# Patient Record
Sex: FEMALE | Race: WHITE | NOT HISPANIC OR LATINO | Employment: OTHER | URBAN - METROPOLITAN AREA
[De-identification: names, ages, dates, MRNs, and addresses within clinical notes are randomized per-mention and may not be internally consistent; named-entity substitution may affect disease eponyms.]

---

## 2017-01-03 ENCOUNTER — APPOINTMENT (OUTPATIENT)
Dept: WOMENS IMAGING | Facility: HOSPITAL | Age: 72
End: 2017-01-03

## 2017-01-03 PROCEDURE — 77063 BREAST TOMOSYNTHESIS BI: CPT | Performed by: RADIOLOGY

## 2017-01-03 PROCEDURE — G0202 SCR MAMMO BI INCL CAD: HCPCS | Performed by: RADIOLOGY

## 2017-01-27 RX ORDER — ATORVASTATIN CALCIUM 10 MG/1
TABLET, FILM COATED ORAL
Qty: 90 TABLET | Refills: 1 | Status: SHIPPED | OUTPATIENT
Start: 2017-01-27 | End: 2017-07-24 | Stop reason: SDUPTHER

## 2017-02-24 RX ORDER — BENAZEPRIL HYDROCHLORIDE AND HYDROCHLOROTHIAZIDE 20; 12.5 MG/1; MG/1
TABLET ORAL
Qty: 90 TABLET | Refills: 1 | Status: SHIPPED | OUTPATIENT
Start: 2017-02-24 | End: 2017-08-22 | Stop reason: SDUPTHER

## 2017-04-24 RX ORDER — LEVOTHYROXINE SODIUM 50 MCG
TABLET ORAL
Qty: 90 TABLET | Refills: 1 | Status: SHIPPED | OUTPATIENT
Start: 2017-04-24 | End: 2017-11-08 | Stop reason: SDUPTHER

## 2017-06-13 ENCOUNTER — OFFICE VISIT (OUTPATIENT)
Dept: INTERNAL MEDICINE | Facility: CLINIC | Age: 72
End: 2017-06-13

## 2017-06-13 ENCOUNTER — HOSPITAL ENCOUNTER (OUTPATIENT)
Dept: GENERAL RADIOLOGY | Facility: HOSPITAL | Age: 72
Discharge: HOME | End: 2017-06-13
Attending: INTERNAL MEDICINE | Admitting: INTERNAL MEDICINE

## 2017-06-13 VITALS
BODY MASS INDEX: 32.15 KG/M2 | OXYGEN SATURATION: 96 % | RESPIRATION RATE: 16 BRPM | DIASTOLIC BLOOD PRESSURE: 83 MMHG | HEIGHT: 65 IN | HEART RATE: 84 BPM | WEIGHT: 193 LBS | SYSTOLIC BLOOD PRESSURE: 135 MMHG | TEMPERATURE: 98.2 F

## 2017-06-13 DIAGNOSIS — Z80.3 FAMILY HISTORY OF BREAST CANCER IN FEMALE: ICD-10-CM

## 2017-06-13 DIAGNOSIS — Z00.00 MEDICARE ANNUAL WELLNESS VISIT, INITIAL: Primary | ICD-10-CM

## 2017-06-13 DIAGNOSIS — M15.9 GENERALIZED OSTEOARTHRITIS: ICD-10-CM

## 2017-06-13 DIAGNOSIS — E04.2 NON-TOXIC MULTINODULAR GOITER: ICD-10-CM

## 2017-06-13 DIAGNOSIS — Z80.7 FAMILY HISTORY OF NON-HODGKIN'S LYMPHOMA: ICD-10-CM

## 2017-06-13 DIAGNOSIS — E78.49 OTHER HYPERLIPIDEMIA: ICD-10-CM

## 2017-06-13 DIAGNOSIS — R73.02 IMPAIRED GLUCOSE TOLERANCE: ICD-10-CM

## 2017-06-13 DIAGNOSIS — Z80.7 FAMILY HISTORY OF HODGKIN'S LYMPHOMA: ICD-10-CM

## 2017-06-13 DIAGNOSIS — E55.9 VITAMIN D DEFICIENCY: ICD-10-CM

## 2017-06-13 DIAGNOSIS — I10 ESSENTIAL HYPERTENSION: ICD-10-CM

## 2017-06-13 PROCEDURE — 99214 OFFICE O/P EST MOD 30 MIN: CPT | Performed by: INTERNAL MEDICINE

## 2017-06-13 PROCEDURE — G0438 PPPS, INITIAL VISIT: HCPCS | Performed by: INTERNAL MEDICINE

## 2017-06-13 PROCEDURE — 71020 HC CHEST PA AND LATERAL: CPT

## 2017-06-13 NOTE — PATIENT INSTRUCTIONS
Medicare Wellness  Personal Prevention Plan of Service     Date of Office Visit:  2017  Encounter Provider:  Beka Lara MD  Place of Service:  [facility] INTERNAL MEDICINE  Patient Name: Sagrario Varma  :  1945    As part of the Medicare Wellness portion of your visit today, we are providing you with this personalized preventive plan of services (PPPS). This plan is based upon recommendations of the [place] Preventive Services Task Force (USPSTF) and the Advisory Committee on Immunization Practices (ACIP).    This lists the preventive care services that should be considered, and provides dates of when you are due. Items listed as completed are up-to-date and do not require any further intervention.    Health Maintenance   Topic Date Due   • LIPID PANEL  2017   • INFLUENZA VACCINE  2017   • TDAP/TD VACCINES (2 - Td) 10/17/2017   • MEDICARE ANNUAL WELLNESS  2018   • MAMMOGRAM  2019   • COLONOSCOPY  2027   • HEPATITIS C SCREENING  Addressed   • PNEUMOCOCCAL VACCINES (65+ LOW/MEDIUM RISK)  Completed   • ZOSTER VACCINE  Completed       Orders Placed This Encounter   Procedures   • XR Chest PA & Lateral     Order Specific Question:   Reason for Exam:     Answer:   chronic cough   • Hemoglobin A1c   • Lipid Panel With LDL / HDL Ratio   • Comprehensive Metabolic Panel   • T4, Free   • TSH   • C-reactive Protein   • CBC & Differential     Order Specific Question:   Manual Differential     Answer:   No   • Urinalysis With Microscopic       Return in about 6 months (around 2017).

## 2017-06-13 NOTE — PROGRESS NOTES
QUICK REFERENCE INFORMATION:  The ABCs of the Annual Wellness Visit    Initial Medicare Wellness Visit    HEALTH RISK ASSESSMENT    1945    Recent Hospitalizations:  No hospitalization(s) within the last year..        Current Medical Providers:  Patient Care Team:  Beka Lara MD as PCP - General (Internal Medicine)  Beka Lara MD as PCP - Claims Attributed        Smoking Status:  History   Smoking Status   • Never Smoker   Smokeless Tobacco   • Never Used       Alcohol Consumption:  History   Alcohol Use No       Depression Screen:   PHQ-9 Depression Screening 6/13/2017   Little interest or pleasure in doing things 0   Feeling down, depressed, or hopeless 0   Trouble falling or staying asleep, or sleeping too much 0   Feeling tired or having little energy 0   Poor appetite or overeating 0   Feeling bad about yourself - or that you are a failure or have let yourself or your family down 0   Trouble concentrating on things, such as reading the newspaper or watching television 0   Moving or speaking so slowly that other people could have noticed. Or the opposite - being so fidgety or restless that you have been moving around a lot more than usual 0   Thoughts that you would be better off dead, or of hurting yourself in some way 0   PHQ-9 Total Score 0   If you checked off any problems, how difficult have these problems made it for you to do your work, take care of things at home, or get along with other people? Not difficult at all       Health Habits and Functional and Cognitive Screening:  Functional & Cognitive Status 6/13/2017   Do you have difficulty preparing food and eating? No   Do you have difficulty bathing yourself? No   Do you have difficulty getting dressed? No   Do you have difficulty using the toilet? No   Do you have difficulty moving around from place to place? No   In the past year have you fallen or experienced a near fall? No   Do you need help using the phone?  No   Are you  deaf or do you have serious difficulty hearing?  No   Do you need help with transportation? No   Do you need help shopping? No   Do you need help preparing meals?  No   Do you need help with housework?  No   Do you need help with laundry? No   Do you need help taking your medications? No   Do you need help managing money? No   Do you have difficulty concentrating, remembering or making decisions? No       Health Habits  Current Diet: Well Balanced Diet  Dental Exam: Up to date  Eye Exam: Up to date  Exercise (times per week): 5 times per week  Current Exercise Activities Include: Yard Work          Does the patient have evidence of cognitive impairment? No    Asiprin use counseling: Taking ASA appropriately as indicated      Recent Lab Results:    Visual Acuity:  No exam data present    Age-appropriate Screening Schedule:  Refer to the list below for future screening recommendations based on patient's age, sex and/or medical conditions. Orders for these recommended tests are listed in the plan section. The patient has been provided with a written plan.    Health Maintenance   Topic Date Due   • LIPID PANEL  06/02/2017   • INFLUENZA VACCINE  08/01/2017   • TDAP/TD VACCINES (2 - Td) 10/17/2017   • MAMMOGRAM  01/03/2019   • COLONOSCOPY  01/06/2027   • PNEUMOCOCCAL VACCINES (65+ LOW/MEDIUM RISK)  Completed   • ZOSTER VACCINE  Completed        Subjective   History of Present Illness    Sagrario Varma is a 71 y.o. female who presents for an Annual Wellness Visit.    The following portions of the patient's history were reviewed and updated as appropriate: allergies, current medications, past family history, past medical history, past social history, past surgical history and problem list.    Outpatient Medications Prior to Visit   Medication Sig Dispense Refill   • ACTICLATE 75 MG tablet      • Ascorbic Acid (VITAMIN C) 500 MG capsule Take 1 capsule by mouth.     • aspirin 81 MG tablet Take 81 mg by mouth Daily.     •  "atorvastatin (LIPITOR) 10 MG tablet TAKE 1 TABLET DAILY 90 tablet 1   • benazepril-hydrochlorthiazide (LOTENSIN HCT) 20-12.5 MG per tablet TAKE 1 TABLET DAILY 90 tablet 1   • Calcium 500-125 MG-UNIT tablet Take 2 tablets by mouth.     • Cholecalciferol (VITAMIN D) 1000 UNITS tablet Take 1,000 Units by mouth.     • ROSADAN 0.75 % (CREAM) kit      • SYNTHROID 50 MCG tablet TAKE 1 TABLET DAILY 90 tablet 1   • mometasone (NASONEX) 50 MCG/ACT nasal spray 2 sprays into each nostril Daily. 1 each 12     No facility-administered medications prior to visit.        Patient Active Problem List   Diagnosis   • Arthritis   • Generalized osteoarthritis   • Hyperglycemia   • Hyperlipidemia   • Hypertension   • Impacted cerumen   • Non-toxic multinodular goiter   • Osteoarthritis   • Impaired glucose tolerance   • Vitamin D deficiency   • Encounter for screening colonoscopy   • Thyroid nodule   • Family history of non-Hodgkin's lymphoma   • Family history of Hodgkin's lymphoma   • Family history of breast cancer in female   • Medicare annual wellness visit, initial       Advance Care Planning:  has NO advance directive - not interested in additional information    Identification of Risk Factors:  Risk factors include: weight .    Review of Systems    Compared to one year ago, the patient feels her physical health is the same.  Compared to one year ago, the patient feels her mental health is the same.    Objective     Physical Exam    Vitals:    06/13/17 0853   BP: 135/83   BP Location: Left arm   Patient Position: Sitting   Cuff Size: Small Adult   Pulse: 84   Resp: 16   Temp: 98.2 °F (36.8 °C)   TempSrc: Oral   SpO2: 96%   Weight: 193 lb (87.5 kg)   Height: 64.5\" (163.8 cm)   PainSc: 0-No pain       Body mass index is 32.62 kg/(m^2).  Discussed the patient's BMI with her. The BMI is above average; BMI management plan is completed.    Assessment/Plan   Patient Self-Management and Personalized Health Advice  The patient has been " provided with information about: weight management and preventive services including:   · Exercise counseling provided, Nutrition counseling provided.    Visit Diagnoses:    ICD-10-CM ICD-9-CM   1. Medicare annual wellness visit, initial Z00.00 V70.0   2. Family history of non-Hodgkin's lymphoma Z80.7 V16.7   3. Family history of Hodgkin's lymphoma Z80.7 V16.7   4. Family history of breast cancer in female Z80.3 V16.3   5. Generalized osteoarthritis M15.9 715.00   6. Essential hypertension I10 401.9   7. Other hyperlipidemia E78.4 272.4   8. Vitamin D deficiency E55.9 268.9   9. Impaired glucose tolerance R73.02 790.22   10. Non-toxic multinodular goiter E04.2 241.1       Orders Placed This Encounter   Procedures   • Hemoglobin A1c   • Lipid Panel With LDL / HDL Ratio   • Comprehensive Metabolic Panel   • T4, Free   • TSH   • C-reactive Protein   • CBC & Differential     Order Specific Question:   Manual Differential     Answer:   No   • Urinalysis With Microscopic       Outpatient Encounter Prescriptions as of 6/13/2017   Medication Sig Dispense Refill   • ACTICLATE 75 MG tablet      • Ascorbic Acid (VITAMIN C) 500 MG capsule Take 1 capsule by mouth.     • aspirin 81 MG tablet Take 81 mg by mouth Daily.     • atorvastatin (LIPITOR) 10 MG tablet TAKE 1 TABLET DAILY 90 tablet 1   • benazepril-hydrochlorthiazide (LOTENSIN HCT) 20-12.5 MG per tablet TAKE 1 TABLET DAILY 90 tablet 1   • Calcium 500-125 MG-UNIT tablet Take 2 tablets by mouth.     • Cholecalciferol (VITAMIN D) 1000 UNITS tablet Take 1,000 Units by mouth.     • ROSADAN 0.75 % (CREAM) kit      • SYNTHROID 50 MCG tablet TAKE 1 TABLET DAILY 90 tablet 1   • mometasone (NASONEX) 50 MCG/ACT nasal spray 2 sprays into each nostril Daily. 1 each 12     No facility-administered encounter medications on file as of 6/13/2017.        Reviewed use of high risk medication in the elderly: yes  Reviewed for potential of harmful drug interactions in the elderly:  yes    Follow Up:  No Follow-up on file.     An After Visit Summary and PPPS with all of these plans were given to the patient.

## 2017-06-14 LAB
ALBUMIN SERPL-MCNC: 4.5 G/DL (ref 3.5–4.8)
ALBUMIN/GLOB SERPL: 1.6 {RATIO} (ref 1.2–2.2)
ALP SERPL-CCNC: 102 IU/L (ref 39–117)
ALT SERPL-CCNC: 27 IU/L (ref 0–32)
APPEARANCE UR: CLEAR
AST SERPL-CCNC: 26 IU/L (ref 0–40)
BACTERIA #/AREA URNS HPF: NORMAL /[HPF]
BASOPHILS # BLD AUTO: 0.1 X10E3/UL (ref 0–0.2)
BASOPHILS NFR BLD AUTO: 1 %
BILIRUB SERPL-MCNC: 0.4 MG/DL (ref 0–1.2)
BILIRUB UR QL STRIP: NEGATIVE
BUN SERPL-MCNC: 10 MG/DL (ref 8–27)
BUN/CREAT SERPL: 16 (ref 12–28)
CALCIUM SERPL-MCNC: 9.2 MG/DL (ref 8.7–10.3)
CHLORIDE SERPL-SCNC: 99 MMOL/L (ref 96–106)
CHOLEST SERPL-MCNC: 167 MG/DL (ref 100–199)
CO2 SERPL-SCNC: 26 MMOL/L (ref 18–29)
COLOR UR: YELLOW
CREAT SERPL-MCNC: 0.63 MG/DL (ref 0.57–1)
CRP SERPL-MCNC: 0.6 MG/L (ref 0–4.9)
EOSINOPHIL # BLD AUTO: 0.2 X10E3/UL (ref 0–0.4)
EOSINOPHIL NFR BLD AUTO: 3 %
EPI CELLS #/AREA URNS HPF: NORMAL /HPF
ERYTHROCYTE [DISTWIDTH] IN BLOOD BY AUTOMATED COUNT: 13.7 % (ref 12.3–15.4)
GLOBULIN SER CALC-MCNC: 2.8 G/DL (ref 1.5–4.5)
GLUCOSE SERPL-MCNC: 95 MG/DL (ref 65–99)
GLUCOSE UR QL: NEGATIVE
HBA1C MFR BLD: 5.9 % (ref 4.8–5.6)
HCT VFR BLD AUTO: 43 % (ref 34–46.6)
HDLC SERPL-MCNC: 49 MG/DL
HGB BLD-MCNC: 14.4 G/DL (ref 11.1–15.9)
HGB UR QL STRIP: NEGATIVE
IMM GRANULOCYTES # BLD: 0 X10E3/UL (ref 0–0.1)
IMM GRANULOCYTES NFR BLD: 0 %
KETONES UR QL STRIP: NEGATIVE
LDLC SERPL CALC-MCNC: 94 MG/DL (ref 0–99)
LDLC/HDLC SERPL: 1.9 RATIO UNITS (ref 0–3.2)
LEUKOCYTE ESTERASE UR QL STRIP: (no result)
LYMPHOCYTES # BLD AUTO: 2.3 X10E3/UL (ref 0.7–3.1)
LYMPHOCYTES NFR BLD AUTO: 36 %
MCH RBC QN AUTO: 29.3 PG (ref 26.6–33)
MCHC RBC AUTO-ENTMCNC: 33.5 G/DL (ref 31.5–35.7)
MCV RBC AUTO: 87 FL (ref 79–97)
MICRO URNS: (no result)
MONOCYTES # BLD AUTO: 0.6 X10E3/UL (ref 0.1–0.9)
MONOCYTES NFR BLD AUTO: 9 %
MUCOUS THREADS URNS QL MICRO: PRESENT
NEUTROPHILS # BLD AUTO: 3.4 X10E3/UL (ref 1.4–7)
NEUTROPHILS NFR BLD AUTO: 51 %
NITRITE UR QL STRIP: NEGATIVE
PH UR STRIP: 7 [PH] (ref 5–7.5)
PLATELET # BLD AUTO: 346 X10E3/UL (ref 150–379)
POTASSIUM SERPL-SCNC: 4.3 MMOL/L (ref 3.5–5.2)
PROT SERPL-MCNC: 7.3 G/DL (ref 6–8.5)
PROT UR QL STRIP: NEGATIVE
RBC # BLD AUTO: 4.92 X10E6/UL (ref 3.77–5.28)
RBC #/AREA URNS HPF: NORMAL /HPF
SODIUM SERPL-SCNC: 142 MMOL/L (ref 134–144)
SP GR UR: 1.01 (ref 1–1.03)
T4 FREE SERPL-MCNC: 1.42 NG/DL (ref 0.82–1.77)
TRIGL SERPL-MCNC: 119 MG/DL (ref 0–149)
TSH SERPL DL<=0.005 MIU/L-ACNC: 2.47 UIU/ML (ref 0.45–4.5)
UROBILINOGEN UR STRIP-MCNC: 0.2 MG/DL (ref 0.2–1)
VLDLC SERPL CALC-MCNC: 24 MG/DL (ref 5–40)
WBC # BLD AUTO: 6.5 X10E3/UL (ref 3.4–10.8)
WBC #/AREA URNS HPF: NORMAL /HPF

## 2017-06-25 NOTE — PROGRESS NOTES
Subjective   Sagrario Varma is a 71 y.o. female.   She is here today for Medicare annual wellness visit initial along with family history of non-Hodgkin's lymphoma family history of Hodgkin's lymphoma as well and family history of breast cancer in female and generalized osteoarthritis hypertension hyperlipidemia vitamin D deficiency impaired glucose tolerance and nontoxic multinodular goiter  History of Present Illness   She is here today for Medicare annual wellness visit initial along with family history of non-Hodgkin's lymphoma as well as Hodgkin's lymphoma as well as family history of breast cancer in female and generalized osteoarthritis hypertension hyper lipidemia vitamin D deficiency impaired fasting glucose and nontoxic multinodular goiter  The following portions of the patient's history were reviewed and updated as appropriate: allergies, current medications, past family history, past medical history, past social history, past surgical history and problem list.    Review of Systems   All other systems reviewed and are negative.      Objective   Physical Exam   Constitutional: She is oriented to person, place, and time. Vital signs are normal. She appears well-developed and well-nourished. She is active.   HENT:   Head: Normocephalic and atraumatic.   Right Ear: Hearing, tympanic membrane, external ear and ear canal normal.   Left Ear: Hearing, tympanic membrane, external ear and ear canal normal.   Nose: Nose normal.   Mouth/Throat: Uvula is midline, oropharynx is clear and moist and mucous membranes are normal.   Eyes: Conjunctivae, EOM and lids are normal. Pupils are equal, round, and reactive to light. Right eye exhibits no discharge. Left eye exhibits no discharge.   Neck: Trachea normal, normal range of motion, full passive range of motion without pain and phonation normal. Neck supple. Carotid bruit is not present. No edema present. Thyroid mass (Goiter) present. No thyromegaly present.    Cardiovascular: Normal rate, regular rhythm, normal heart sounds, intact distal pulses and normal pulses.  Exam reveals no gallop and no friction rub.    No murmur heard.  Pulmonary/Chest: Effort normal and breath sounds normal. No respiratory distress. She has no wheezes. She has no rales.   Abdominal: Soft. Normal appearance, normal aorta and bowel sounds are normal. She exhibits no distension, no abdominal bruit and no mass. There is no hepatosplenomegaly. There is no tenderness. There is no rebound, no guarding and no CVA tenderness. No hernia. Hernia confirmed negative in the right inguinal area and confirmed negative in the left inguinal area.   Musculoskeletal: Normal range of motion. She exhibits no edema or tenderness.       Vascular Status -  Her exam exhibits right foot vasculature normal. Her exam exhibits no right foot edema. Her exam exhibits left foot vasculature normal. Her exam exhibits no left foot edema.   Skin Integrity  -  Her right foot skin is intact.     Sagrario 's left foot skin is intact. .  Lymphadenopathy:     She has no cervical adenopathy.     She has no axillary adenopathy.        Right: No inguinal and no supraclavicular adenopathy present.        Left: No inguinal and no supraclavicular adenopathy present.   Neurological: She is alert and oriented to person, place, and time. She has normal strength. No cranial nerve deficit or sensory deficit. She exhibits normal muscle tone. She displays a negative Romberg sign. Coordination normal.   Skin: Skin is warm, dry and intact. No cyanosis. Nails show no clubbing.   Psychiatric: She has a normal mood and affect. Her speech is normal and behavior is normal. Judgment and thought content normal. Cognition and memory are normal.   Nursing note and vitals reviewed.      Assessment/Plan   Diagnoses and all orders for this visit:    Medicare annual wellness visit, initial  -     Hemoglobin A1c  -     Lipid Panel With LDL / HDL Ratio  -     CBC &  Differential  -     Comprehensive Metabolic Panel  -     T4, Free  -     TSH  -     Urinalysis With Microscopic  -     C-reactive Protein    Family history of non-Hodgkin's lymphoma  -     Hemoglobin A1c  -     Lipid Panel With LDL / HDL Ratio  -     CBC & Differential  -     Comprehensive Metabolic Panel  -     T4, Free  -     TSH  -     Urinalysis With Microscopic  -     C-reactive Protein  -     XR Chest PA & Lateral    Family history of Hodgkin's lymphoma  -     Hemoglobin A1c  -     Lipid Panel With LDL / HDL Ratio  -     CBC & Differential  -     Comprehensive Metabolic Panel  -     T4, Free  -     TSH  -     Urinalysis With Microscopic  -     C-reactive Protein  -     XR Chest PA & Lateral    Family history of breast cancer in female  -     Hemoglobin A1c  -     Lipid Panel With LDL / HDL Ratio  -     CBC & Differential  -     Comprehensive Metabolic Panel  -     T4, Free  -     TSH  -     Urinalysis With Microscopic  -     C-reactive Protein  -     XR Chest PA & Lateral    Generalized osteoarthritis  -     Hemoglobin A1c  -     Lipid Panel With LDL / HDL Ratio  -     CBC & Differential  -     Comprehensive Metabolic Panel  -     T4, Free  -     TSH  -     Urinalysis With Microscopic  -     C-reactive Protein    Essential hypertension  -     Hemoglobin A1c  -     Lipid Panel With LDL / HDL Ratio  -     CBC & Differential  -     Comprehensive Metabolic Panel  -     T4, Free  -     TSH  -     Urinalysis With Microscopic  -     C-reactive Protein    Other hyperlipidemia  -     Hemoglobin A1c  -     Lipid Panel With LDL / HDL Ratio  -     CBC & Differential  -     Comprehensive Metabolic Panel  -     T4, Free  -     TSH  -     Urinalysis With Microscopic  -     C-reactive Protein    Vitamin D deficiency  -     Hemoglobin A1c  -     Lipid Panel With LDL / HDL Ratio  -     CBC & Differential  -     Comprehensive Metabolic Panel  -     T4, Free  -     TSH  -     Urinalysis With Microscopic  -     C-reactive  Protein    Impaired glucose tolerance  -     Hemoglobin A1c  -     Lipid Panel With LDL / HDL Ratio  -     CBC & Differential  -     Comprehensive Metabolic Panel  -     T4, Free  -     TSH  -     Urinalysis With Microscopic  -     C-reactive Protein    Non-toxic multinodular goiter  -     Hemoglobin A1c  -     Lipid Panel With LDL / HDL Ratio  -     CBC & Differential  -     Comprehensive Metabolic Panel  -     T4, Free  -     TSH  -     Urinalysis With Microscopic  -     C-reactive Protein    Other orders  -     Microscopic Examination        Medicare annual wellness visit initial following recommendations  Impaired glucose tolerance follow hemoglobin A1 C  Nontoxic multinodular goiter noted  Vitamin D deficiency supplementation as needed  Hyper lipidemia keep LDL less than 70 with proper diet exercise medication  Hypertension well-controlled on current medication  Osteoarthritis supportive meds  Family history of breast cancer female noted  Family history of Hodgkin's lymphoma noted  Family history of non-Hodgkin's lymphoma noted

## 2017-07-24 RX ORDER — ATORVASTATIN CALCIUM 10 MG/1
TABLET, FILM COATED ORAL
Qty: 90 TABLET | Refills: 1 | Status: SHIPPED | OUTPATIENT
Start: 2017-07-24 | End: 2018-02-08 | Stop reason: SDUPTHER

## 2017-08-14 ENCOUNTER — APPOINTMENT (OUTPATIENT)
Dept: LAB | Facility: HOSPITAL | Age: 72
End: 2017-08-14

## 2017-08-14 ENCOUNTER — APPOINTMENT (OUTPATIENT)
Dept: ONCOLOGY | Facility: CLINIC | Age: 72
End: 2017-08-14

## 2017-08-22 RX ORDER — BENAZEPRIL HYDROCHLORIDE AND HYDROCHLOROTHIAZIDE 20; 12.5 MG/1; MG/1
TABLET ORAL
Qty: 90 TABLET | Refills: 1 | Status: SHIPPED | OUTPATIENT
Start: 2017-08-22 | End: 2018-02-14 | Stop reason: SDUPTHER

## 2017-08-29 ENCOUNTER — LAB (OUTPATIENT)
Dept: LAB | Facility: HOSPITAL | Age: 72
End: 2017-08-29

## 2017-08-29 ENCOUNTER — OFFICE VISIT (OUTPATIENT)
Dept: ONCOLOGY | Facility: CLINIC | Age: 72
End: 2017-08-29

## 2017-08-29 VITALS
TEMPERATURE: 97.9 F | BODY MASS INDEX: 32.3 KG/M2 | SYSTOLIC BLOOD PRESSURE: 142 MMHG | HEIGHT: 64 IN | RESPIRATION RATE: 18 BRPM | OXYGEN SATURATION: 98 % | WEIGHT: 189.2 LBS | DIASTOLIC BLOOD PRESSURE: 84 MMHG | HEART RATE: 71 BPM

## 2017-08-29 DIAGNOSIS — E78.1 PURE HYPERGLYCERIDEMIA: ICD-10-CM

## 2017-08-29 DIAGNOSIS — M15.9 GENERALIZED OSTEOARTHRITIS: ICD-10-CM

## 2017-08-29 DIAGNOSIS — C50.919 MALIGNANT NEOPLASM OF FEMALE BREAST, UNSPECIFIED LATERALITY, UNSPECIFIED SITE OF BREAST: Primary | ICD-10-CM

## 2017-08-29 DIAGNOSIS — M19.90 ARTHRITIS: ICD-10-CM

## 2017-08-29 DIAGNOSIS — R73.9 HYPERGLYCEMIA: ICD-10-CM

## 2017-08-29 DIAGNOSIS — Z12.11 ENCOUNTER FOR SCREENING COLONOSCOPY: Primary | ICD-10-CM

## 2017-08-29 DIAGNOSIS — D05.10 DCIS (DUCTAL CARCINOMA IN SITU), UNSPECIFIED LATERALITY: ICD-10-CM

## 2017-08-29 LAB
BASOPHILS # BLD AUTO: 0.08 10*3/MM3 (ref 0–0.1)
BASOPHILS NFR BLD AUTO: 1.1 % (ref 0–1.1)
DEPRECATED RDW RBC AUTO: 40.8 FL (ref 37–49)
EOSINOPHIL # BLD AUTO: 0.21 10*3/MM3 (ref 0–0.36)
EOSINOPHIL NFR BLD AUTO: 2.8 % (ref 1–5)
ERYTHROCYTE [DISTWIDTH] IN BLOOD BY AUTOMATED COUNT: 12.5 % (ref 11.7–14.5)
HCT VFR BLD AUTO: 43.3 % (ref 34–45)
HGB BLD-MCNC: 14.9 G/DL (ref 11.5–14.9)
IMM GRANULOCYTES # BLD: 0.03 10*3/MM3 (ref 0–0.03)
IMM GRANULOCYTES NFR BLD: 0.4 % (ref 0–0.5)
LYMPHOCYTES # BLD AUTO: 2.66 10*3/MM3 (ref 1–3.5)
LYMPHOCYTES NFR BLD AUTO: 35.6 % (ref 20–49)
MCH RBC QN AUTO: 30.5 PG (ref 27–33)
MCHC RBC AUTO-ENTMCNC: 34.4 G/DL (ref 32–35)
MCV RBC AUTO: 88.5 FL (ref 83–97)
MONOCYTES # BLD AUTO: 0.82 10*3/MM3 (ref 0.25–0.8)
MONOCYTES NFR BLD AUTO: 11 % (ref 4–12)
NEUTROPHILS # BLD AUTO: 3.68 10*3/MM3 (ref 1.5–7)
NEUTROPHILS NFR BLD AUTO: 49.1 % (ref 39–75)
NRBC BLD MANUAL-RTO: 0 /100 WBC (ref 0–0)
PLATELET # BLD AUTO: 283 10*3/MM3 (ref 150–375)
PMV BLD AUTO: 9.6 FL (ref 8.9–12.1)
RBC # BLD AUTO: 4.89 10*6/MM3 (ref 3.9–5)
WBC NRBC COR # BLD: 7.48 10*3/MM3 (ref 4–10)

## 2017-08-29 PROCEDURE — 85025 COMPLETE CBC W/AUTO DIFF WBC: CPT | Performed by: INTERNAL MEDICINE

## 2017-08-29 PROCEDURE — 99213 OFFICE O/P EST LOW 20 MIN: CPT | Performed by: INTERNAL MEDICINE

## 2017-08-29 PROCEDURE — 36416 COLLJ CAPILLARY BLOOD SPEC: CPT | Performed by: INTERNAL MEDICINE

## 2017-08-29 NOTE — PROGRESS NOTES
Subjective .     REASONS FOR FOLLOWUP:    1. 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   2. Status post partial thyroidectomy.   3. Status post knee surgery.     HISTORY OF PRESENT ILLNESS:  The patient is a 71 y.o. year old female who is here for follow-up with the above-mentioned history.    History of Present Illness     The patient is a 67-year-old female with the above history, currently here for followup. She has completed 5 years of tamoxifen as of 2015. She is doing very well. She does not h ave any complaints. She has undergone mammogram on January 3, 2017 which was negative.     PAST MEDICAL HISTORY:   4. Hypertension.   5. High cholesterol.   6. Multinodular goiter of the thyroid gland. She did undergo ultrasound of the thyroid gland and found multinodular goiter. She is followed by Dr. Restrepo for that. She has had a biopsy in the past which was negative.   7. Dilatation and curettage x2 for excessive bleeding.     OB/GYN HISTORY: She started menstrual period at age 12. She obtained menopause at age 56. She is  4, para 3, one stillborn. She has 3 children, two daughters and one son, 37, 35 and 28 year old with normal deliveries.     ONCOLOGIC HISTORY:      The patient was following up with yearly mammogram. She had her yearly mammogram done  10 by Dr. Napoles her primary care physician which showed calcifications in the right breast with additional evaluation and as a result she repeated the mammogram on 2010 which showed calcifications in the right breast which were suspicious in the anterior one-third of the right breast at the 12 o' clock position 2 cm away from the nipple.    Subsequently the patient underwent biopsy on 20 10 done at [facility], #[id number]. Path is consistent with ductal carcinoma in situ low to intermediate grade, solid and cribriform types, at least 5 mm in  maximum dimension. There was no invasive carcinoma identified and there was some fibrocystic changes identified. Hormonal assays were performed, ER positive at 99%, NY positive at 99%, HER/2 was 1+ and Ki-67 was 6.      She subsequently was referred to Dr. Reid and MRI of the breast was also obtained. MRI of the breast 0 4/24/20 10 had shown post biopsy cavity within the right breast at the 12 o'clock position with an internal metallic clip but no suspicio us surrounding or residual enhancement seen in the right breast. There was no evidence of any axillary adenopathy. There were no suspicious findings on the left breast.      The patient then was admitted to [facility] at which time she underwent surgery by Dr. Reid 0 5/19/20 10. Pathology #[id number]. She underwent right breast lumpectomy with needle localization. The right breast lumpectomy showed breast tissue with single focus of atypical ductal hyperplasia adjacent to the biopsy site. Res idual carcinoma was not identified. There were some fibrocystic changes including ductal hyperplasia without atypia. The additional superior margin on the right breast showed duct hyperplasia without atypia and the deep margin also showed some duct hype rplasia without atypia.      As a result the patient was then referred to Dr. Sumeet Reddy for evaluation of brachytherapy. The patient then received brachytherapy with 10 total fractions given twice daily over a 5- treatment- day period and was prescribed 350 cGy per treatment fraction. The total dose she received was 3400 cGy in 10 fractions. Currently she is healed from surgery and is here for further recommendations.      Tamoxifen chemoprophylaxis started on 06/22/2010 to complete a total of 5 years.    Mammogram from 12/10/20 11 showed a new oval mass of 4.5 cm. She had a few round calcifications and postsurgical scar in the anterior one-third region of the right breast at the 12 o'clock positive. This area  was thought to be consistent with seroma and pos tsurgical change, however, repeat mammogram was suggested in 6 months.        Dr. Reid aspirated 25 cc of clear fluid on 12/14/10 from the right side and the pathology, accession #[id number], was thought to have adipose stroma tissue, histiocytes and inflammat ory cells. No malignant cells were identified, consistent with fat necrosis. However, repeat mammogram and ultrasound are to be done because of the palpable mass, which is significantly large.      The patient underwent radiation 3400 cGy depth of 1 cm from the MammoSite balloon surface, given 10 fractions of 340 cGy each. Treatments were delivered for 5  treatment days, given twice daily. She completed her brachytherapy on 06/09/20 10.      The patient underwent mammogram on 05/17/2011. She was found to have a seroma in the right breast thought to be probably benign and she was to have bilateral yearly mammograms and ultrasound was suggested in  6 months but she has followup with Dr. Reid. He had done drainage of the right breast. The accession # is [id number] at [facility].   So the right breast fine needle aspiration basically showed some inflammatory cells. No malignant cells identified and hence he thought it was just a seroma and not to worry about.    Mammogram 12/14/2011 shows seroma in the right breast, probably benign, however, followup mammogram suggested in  6 months and followup ultrasound of the right breast is suggested in six months.      Mammogram done in June 2012 showed that she had seroma on the right breast, which was 21 mm with calcifications and postsurgical scar in the anterior one-third region of the right breast. It was felt that this is consistent with the seroma, as it had decreased in size. A right breast ultrasound was also done and the ultrasound suggested an oval elongated seroma w ith thick margins, about 21 mm. No solid or suspicious lesions were seen, but a 6-month followup  was suggested.      Mammogram on 2012 was negative.    Patient had mammogram on 2013 and it was negative.    Her mammogram done  4 is negative.    Current Outpatient Prescriptions on File Prior to Visit   Medication Sig Dispense Refill   • ACTICLATE 75 MG tablet      • Ascorbic Acid (VITAMIN C) 500 MG capsule Take 1 capsule by mouth.     • aspirin 81 MG tablet Take 81 mg by mouth Daily.     • atorvastatin (LIPITOR) 10 MG tablet TAKE 1 TABLET DAILY 90 tablet 1   • benazepril-hydrochlorthiazide (LOTENSIN HCT) 20-12.5 MG per tablet TAKE 1 TABLET DAILY 90 tablet 1   • Calcium 500-125 MG-UNIT tablet Take 1 tablet by mouth.     • Cholecalciferol (VITAMIN D) 1000 UNITS tablet Take 1,000 Units by mouth.     • ROSADAN 0.75 % (CREAM) kit      • SYNTHROID 50 MCG tablet TAKE 1 TABLET DAILY 90 tablet 1   • [DISCONTINUED] mometasone (NASONEX) 50 MCG/ACT nasal spray 2 sprays into each nostril Daily. 1 each 12     No current facility-administered medications on file prior to visit.        ALLERGIES:     Allergies   Allergen Reactions   • Morphine Nausea And Vomiting   • Penicillins Hives     SOCIAL HISTORY: She works as a . She does not smoke. She does not drink alcohol. She is  and lives with her .     FAMILY HISTORY: Father  at 57 with a blood clot. Mother  at 72 with a st roke. She has one brother who had melanoma as well as non-Hodgkin lymphoma at age 41; currently he is 65 years old and in good health. She had a sister with breast cancer at age 50. She is followed by our office. She is 57 now and in good health. Hakeem edwards's sister who had breast cancer many years ago now developed metastatic breast cancer, followed by Dr. Gold in our group.         Review of Systems  A comprehensive 14 point review of systems was performed and was negative except as mentioned.    Objective      Vitals:    17 1136   BP: 142/84   Pulse: 71   Resp: 18   Temp: 97.9 °F (36.6 °C)  "  TempSrc: Oral   SpO2: 98%   Weight: 189 lb 3.2 oz (85.8 kg)   Height: 64.37\" (163.5 cm)   PainSc: 0-No pain     Current Status 8/29/2017   ECOG score 0       Physical Exam    GENERAL:  Well-developed, well-nourished in no acute distress.   SKIN:  Warm, dry without rashes, purpura or petechiae.  HEAD:  Normocephalic.  EYES:  Pupils equal, round and reactive to light.  EOMs intact.  Conjunctivae normal.  EARS:  Hearing intact.  NOSE:  Septum midline.  No excoriations or nasal discharge.  MOUTH:  Tongue is well-papillated; no stomatitis or ulcers.  Lips normal.  THROAT:  Oropharynx without lesions or exudates.  NECK:  Supple with good range of motion; no thyromegaly or masses, no JVD.  LYMPHATICS:  No cervical, supraclavicular, axillary or inguinal adenopathy.  CHEST:  Lungs clear to percussion and auscultation. Good airflow.  BREASTS: Right breast, no evidence of any breast masses, no nipple inversion, no skin thickening or erythema there is no evidence of right axillary adenopathy or right supraclavicular adenopathy.  Left breast: no evidence of any breast masses, no nipple inversion, no skin thickening or erythema there is no evidence of right axillary adenopathy or right supraclavicular adenopathy.    CARDIAC:  Regular rate and rhythm without murmurs, rubs or gallops. Normal S1,S2.  ABDOMEN:  Soft, nontender with no organomegaly or masses.  EXTREMITIES:  No clubbing, cyanosis or edema.  NEUROLOGICAL:  Cranial Nerves II-XII grossly intact.  No focal neurological deficits.  PSYCHIATRIC:  Normal affect and mood.      RECENT LABS:  Hematology WBC   Date Value Ref Range Status   08/29/2017 7.48 4.00 - 10.00 10*3/mm3 Final     RBC   Date Value Ref Range Status   08/29/2017 4.89 3.90 - 5.00 10*6/mm3 Final     Hemoglobin   Date Value Ref Range Status   08/29/2017 14.9 11.5 - 14.9 g/dL Final     Hematocrit   Date Value Ref Range Status   08/29/2017 43.3 34.0 - 45.0 % Final     Platelets   Date Value Ref Range Status "   08/29/2017 283 994 - 150 10*3/mm3 Final        Assessment/Plan     1. This is a patient with history of ductal carcinoma in situ; she completed tamoxifen 5 years. She is here for followup. S he is on a yearly mammogram. She has a family history of breast cancer and she prefers to come here once a year; hence, we will keep her once a year over here.     2.  Family history of breast cancer, he shouldn't sister has metastatic breast cancer with bone metastases and a brother who has history of non-Hodgkin's lymphoma 25 years ago and now with recurrence..    Follow-up in 1 year with labs.    MD Franklin Bassett Stephen J, MD

## 2017-10-03 ENCOUNTER — OFFICE VISIT (OUTPATIENT)
Dept: INTERNAL MEDICINE | Facility: CLINIC | Age: 72
End: 2017-10-03

## 2017-10-03 VITALS
TEMPERATURE: 98.4 F | HEIGHT: 64 IN | DIASTOLIC BLOOD PRESSURE: 77 MMHG | SYSTOLIC BLOOD PRESSURE: 132 MMHG | WEIGHT: 190 LBS | HEART RATE: 85 BPM | RESPIRATION RATE: 16 BRPM | BODY MASS INDEX: 32.44 KG/M2 | OXYGEN SATURATION: 96 %

## 2017-10-03 DIAGNOSIS — R20.0 NUMBNESS AND TINGLING OF RIGHT SIDE OF FACE: ICD-10-CM

## 2017-10-03 DIAGNOSIS — Z91.81 HISTORY OF RECENT FALL: Primary | ICD-10-CM

## 2017-10-03 DIAGNOSIS — R20.2 NUMBNESS AND TINGLING OF RIGHT SIDE OF FACE: ICD-10-CM

## 2017-10-03 DIAGNOSIS — R20.0 NUMBNESS: ICD-10-CM

## 2017-10-03 PROCEDURE — 99213 OFFICE O/P EST LOW 20 MIN: CPT | Performed by: NURSE PRACTITIONER

## 2017-10-03 NOTE — PROGRESS NOTES
Vitals:    10/03/17 1538   BP: 132/77   Pulse: 85   Resp: 16   Temp: 98.4 °F (36.9 °C)   SpO2: 96%     Last 2 weights    10/03/17  1538   Weight: 190 lb (86.2 kg)     Social History   Substance Use Topics   • Smoking status: Never Smoker   • Smokeless tobacco: Never Used   • Alcohol use No       Subjective     HPI  Pt presents to office today with new problem of a recent fall 1 week ago today. Pt states she fell into a trash can and hit the corner of it creating a laceration to the right side of her forehead. She states that she is having some numbness on the top right side of her scalp and right side of forehead. She denies slurred speech, visual changes, headache, dizziness, impaired balance, weakness of extremities.    The following portions of the patient's history were reviewed and updated as appropriate: allergies, current medications, past medical history, past social history and problem list.    Review of Systems   Constitutional: Negative.    Respiratory: Negative.    Cardiovascular: Negative.    Neurological: Positive for numbness (right side of top head and forehead from fall).       Objective     Physical Exam   Constitutional: She is oriented to person, place, and time. Vital signs are normal. She appears well-developed and well-nourished.   HENT:   Head: Normocephalic and atraumatic.   Neck: Normal range of motion.   Cardiovascular: Normal rate, regular rhythm and normal heart sounds.    Pulmonary/Chest: Effort normal and breath sounds normal.   Musculoskeletal: Normal range of motion.   Neurological: She is alert and oriented to person, place, and time. She has normal strength. A sensory deficit (numbness of right side of head/forehead when palpating) is present. No cranial nerve deficit. She displays a negative Romberg sign. GCS eye subscore is 4. GCS verbal subscore is 5. GCS motor subscore is 6.   Neg neuro exam aside from palpation of top right side of head and around laceration site   Skin:  Laceration (right forehead. healing no s/s iinfection) noted.   Nursing note and vitals reviewed.      Assessment/Plan   Sagrario was seen today for fall.    Diagnoses and all orders for this visit:    History of recent fall  -     CT Head Without Contrast    Numbness  Comments:  right side of head  Orders:  -     CT Head Without Contrast    Numbness and tingling of right side of face  -     CT Head Without Contrast         -ct head  -continue to moniotor as it is likely she has some nerve damage r/t where laceration occurred  -educated pt on s/s stroke and worsening symptoms  -FU prn or if symptoms persist/worsen

## 2017-10-09 ENCOUNTER — HOSPITAL ENCOUNTER (OUTPATIENT)
Dept: CT IMAGING | Facility: HOSPITAL | Age: 72
Discharge: HOME | End: 2017-10-09
Admitting: NURSE PRACTITIONER

## 2017-10-09 PROCEDURE — 70450 CT HEAD/BRAIN W/O DYE: CPT

## 2017-11-08 RX ORDER — LEVOTHYROXINE SODIUM 50 MCG
TABLET ORAL
Qty: 90 TABLET | Refills: 1 | Status: SHIPPED | OUTPATIENT
Start: 2017-11-08 | End: 2018-04-15 | Stop reason: SDUPTHER

## 2017-11-14 ENCOUNTER — APPOINTMENT (OUTPATIENT)
Dept: GENERAL RADIOLOGY | Facility: HOSPITAL | Age: 72
End: 2017-11-14

## 2017-11-14 PROCEDURE — 73130 X-RAY EXAM OF HAND: CPT | Performed by: EMERGENCY MEDICINE

## 2017-12-14 ENCOUNTER — OFFICE VISIT (OUTPATIENT)
Dept: INTERNAL MEDICINE | Facility: CLINIC | Age: 72
End: 2017-12-14

## 2017-12-14 VITALS
SYSTOLIC BLOOD PRESSURE: 158 MMHG | OXYGEN SATURATION: 98 % | HEIGHT: 64 IN | DIASTOLIC BLOOD PRESSURE: 78 MMHG | WEIGHT: 187 LBS | RESPIRATION RATE: 16 BRPM | BODY MASS INDEX: 31.92 KG/M2 | HEART RATE: 79 BPM | TEMPERATURE: 97.5 F

## 2017-12-14 DIAGNOSIS — R73.02 IMPAIRED GLUCOSE TOLERANCE: ICD-10-CM

## 2017-12-14 DIAGNOSIS — I10 ESSENTIAL HYPERTENSION: Primary | ICD-10-CM

## 2017-12-14 DIAGNOSIS — E78.49 OTHER HYPERLIPIDEMIA: ICD-10-CM

## 2017-12-14 DIAGNOSIS — M15.9 GENERALIZED OSTEOARTHRITIS: ICD-10-CM

## 2017-12-14 LAB
ALBUMIN SERPL-MCNC: 4.6 G/DL (ref 3.5–5.2)
ALBUMIN/GLOB SERPL: 1.5 G/DL
ALP SERPL-CCNC: 106 U/L (ref 39–117)
ALT SERPL-CCNC: 20 U/L (ref 1–33)
APPEARANCE UR: CLEAR
AST SERPL-CCNC: 20 U/L (ref 1–32)
BACTERIA #/AREA URNS HPF: NORMAL /HPF
BASOPHILS # BLD AUTO: 0.05 10*3/MM3 (ref 0–0.2)
BASOPHILS NFR BLD AUTO: 0.7 % (ref 0–1.5)
BILIRUB SERPL-MCNC: 0.4 MG/DL (ref 0.1–1.2)
BILIRUB UR QL STRIP: NEGATIVE
BUN SERPL-MCNC: 10 MG/DL (ref 8–23)
BUN/CREAT SERPL: 14.9 (ref 7–25)
CALCIUM SERPL-MCNC: 9.8 MG/DL (ref 8.6–10.5)
CASTS URNS MICRO: NORMAL
CHLORIDE SERPL-SCNC: 96 MMOL/L (ref 98–107)
CHOLEST SERPL-MCNC: 163 MG/DL (ref 0–200)
CO2 SERPL-SCNC: 30.2 MMOL/L (ref 22–29)
COLOR UR: YELLOW
CREAT SERPL-MCNC: 0.67 MG/DL (ref 0.57–1)
EOSINOPHIL # BLD AUTO: 0.2 10*3/MM3 (ref 0–0.7)
EOSINOPHIL NFR BLD AUTO: 2.9 % (ref 0.3–6.2)
EPI CELLS #/AREA URNS HPF: NORMAL /HPF
ERYTHROCYTE [DISTWIDTH] IN BLOOD BY AUTOMATED COUNT: 13.3 % (ref 11.7–13)
GFR SERPLBLD CREATININE-BSD FMLA CKD-EPI: 105 ML/MIN/1.73
GFR SERPLBLD CREATININE-BSD FMLA CKD-EPI: 87 ML/MIN/1.73
GLOBULIN SER CALC-MCNC: 3 GM/DL
GLUCOSE SERPL-MCNC: 92 MG/DL (ref 65–99)
GLUCOSE UR QL: NEGATIVE
HBA1C MFR BLD: 5.61 % (ref 4.8–5.6)
HCT VFR BLD AUTO: 45.3 % (ref 35.6–45.5)
HDLC SERPL-MCNC: 54 MG/DL (ref 40–60)
HGB BLD-MCNC: 14.8 G/DL (ref 11.9–15.5)
HGB UR QL STRIP: NEGATIVE
IMM GRANULOCYTES # BLD: 0.02 10*3/MM3 (ref 0–0.03)
IMM GRANULOCYTES NFR BLD: 0.3 % (ref 0–0.5)
KETONES UR QL STRIP: NEGATIVE
LDLC SERPL CALC-MCNC: 87 MG/DL (ref 0–100)
LDLC/HDLC SERPL: 1.62 {RATIO}
LEUKOCYTE ESTERASE UR QL STRIP: (no result)
LYMPHOCYTES # BLD AUTO: 2.27 10*3/MM3 (ref 0.9–4.8)
LYMPHOCYTES NFR BLD AUTO: 33 % (ref 19.6–45.3)
MCH RBC QN AUTO: 30.9 PG (ref 26.9–32)
MCHC RBC AUTO-ENTMCNC: 32.7 G/DL (ref 32.4–36.3)
MCV RBC AUTO: 94.6 FL (ref 80.5–98.2)
MONOCYTES # BLD AUTO: 0.62 10*3/MM3 (ref 0.2–1.2)
MONOCYTES NFR BLD AUTO: 9 % (ref 5–12)
NEUTROPHILS # BLD AUTO: 3.72 10*3/MM3 (ref 1.9–8.1)
NEUTROPHILS NFR BLD AUTO: 54.1 % (ref 42.7–76)
NITRITE UR QL STRIP: NEGATIVE
PH UR STRIP: 7.5 [PH] (ref 5–8)
PLATELET # BLD AUTO: 354 10*3/MM3 (ref 140–500)
POTASSIUM SERPL-SCNC: 4.3 MMOL/L (ref 3.5–5.2)
PROT SERPL-MCNC: 7.6 G/DL (ref 6–8.5)
PROT UR QL STRIP: NEGATIVE
RBC # BLD AUTO: 4.79 10*6/MM3 (ref 3.9–5.2)
RBC #/AREA URNS HPF: NORMAL /HPF
SODIUM SERPL-SCNC: 139 MMOL/L (ref 136–145)
SP GR UR: 1.01 (ref 1–1.03)
T4 FREE SERPL-MCNC: 1.44 NG/DL (ref 0.93–1.7)
TRIGL SERPL-MCNC: 108 MG/DL (ref 0–150)
TSH SERPL DL<=0.005 MIU/L-ACNC: 2 MIU/ML (ref 0.27–4.2)
UROBILINOGEN UR STRIP-MCNC: (no result) MG/DL
VLDLC SERPL CALC-MCNC: 21.6 MG/DL (ref 5–40)
WBC # BLD AUTO: 6.88 10*3/MM3 (ref 4.5–10.7)
WBC #/AREA URNS HPF: NORMAL /HPF

## 2017-12-14 PROCEDURE — 99214 OFFICE O/P EST MOD 30 MIN: CPT | Performed by: INTERNAL MEDICINE

## 2017-12-24 NOTE — PROGRESS NOTES
Subjective   Sagrario Varma is a 72 y.o. female.   She is here today for hypertension hyperlipidemia impaired glucose tolerance generalized osteoarthritis  History of Present Illness   She is here today for hypertension which is well-controlled on current medication normally along with hyper lipidemia well-controlled and tolerates Lipitor well and impaired glucose tolerance for which we will check hemoglobin A1c and she eats low-carb diet and osteoarthritis in general for which is tolerable at this time  The following portions of the patient's history were reviewed and updated as appropriate: allergies, current medications, past family history, past medical history, past social history, past surgical history and problem list.    Review of Systems   Musculoskeletal: Positive for arthralgias.   All other systems reviewed and are negative.      Objective   Physical Exam   Constitutional: She is oriented to person, place, and time. She appears well-developed and well-nourished. She is cooperative.   HENT:   Head: Normocephalic and atraumatic.   Right Ear: Hearing, tympanic membrane, external ear and ear canal normal.   Left Ear: Hearing, tympanic membrane, external ear and ear canal normal.   Nose: Nose normal.   Mouth/Throat: Uvula is midline, oropharynx is clear and moist and mucous membranes are normal.   Eyes: Conjunctivae, EOM and lids are normal. Pupils are equal, round, and reactive to light.   Neck: Phonation normal. Neck supple. Carotid bruit is not present.   Cardiovascular: Normal rate, regular rhythm and normal heart sounds.  Exam reveals no gallop and no friction rub.    No murmur heard.  Pulmonary/Chest: Effort normal and breath sounds normal. No respiratory distress.   Abdominal: Soft. Bowel sounds are normal. She exhibits no distension and no mass. There is no hepatosplenomegaly. There is no tenderness. There is no rebound and no guarding. No hernia.   Musculoskeletal: She exhibits tenderness (ultiple  joints). She exhibits no edema.   Neurological: She is alert and oriented to person, place, and time. Coordination and gait normal.   Skin: Skin is warm and dry.   Psychiatric: She has a normal mood and affect. Her speech is normal and behavior is normal. Judgment and thought content normal.   Nursing note and vitals reviewed.      Assessment/Plan   Diagnoses and all orders for this visit:    Essential hypertension  -     Lipid Panel With LDL / HDL Ratio  -     Hemoglobin A1c  -     CBC & Differential  -     Comprehensive Metabolic Panel  -     T4, Free  -     TSH  -     Urinalysis With Microscopic - Urine, Clean Catch    Other hyperlipidemia  -     Lipid Panel With LDL / HDL Ratio  -     Hemoglobin A1c  -     CBC & Differential  -     Comprehensive Metabolic Panel  -     T4, Free  -     TSH  -     Urinalysis With Microscopic - Urine, Clean Catch    Impaired glucose tolerance  -     Lipid Panel With LDL / HDL Ratio  -     Hemoglobin A1c  -     CBC & Differential  -     Comprehensive Metabolic Panel  -     T4, Free  -     TSH  -     Urinalysis With Microscopic - Urine, Clean Catch    Generalized osteoarthritis  -     Lipid Panel With LDL / HDL Ratio  -     Hemoglobin A1c  -     CBC & Differential  -     Comprehensive Metabolic Panel  -     T4, Free  -     TSH  -     Urinalysis With Microscopic - Urine, Clean Catch    Other orders  -     Microscopic Examination

## 2018-01-02 ENCOUNTER — TELEPHONE (OUTPATIENT)
Dept: INTERNAL MEDICINE | Facility: CLINIC | Age: 73
End: 2018-01-02

## 2018-01-02 NOTE — TELEPHONE ENCOUNTER
Pt called and said at her last appointment she was supposed to get a TDAP shot as she has not gotten one in over 10 years and would like to know if she could go ahead and come in to get one? Please advise.

## 2018-01-05 ENCOUNTER — APPOINTMENT (OUTPATIENT)
Dept: WOMENS IMAGING | Facility: HOSPITAL | Age: 73
End: 2018-01-05

## 2018-01-05 PROCEDURE — 77067 SCR MAMMO BI INCL CAD: CPT | Performed by: RADIOLOGY

## 2018-01-05 PROCEDURE — 77063 BREAST TOMOSYNTHESIS BI: CPT | Performed by: RADIOLOGY

## 2018-02-08 RX ORDER — ATORVASTATIN CALCIUM 10 MG/1
10 TABLET, FILM COATED ORAL DAILY
Qty: 90 TABLET | Refills: 1 | Status: SHIPPED | OUTPATIENT
Start: 2018-02-08 | End: 2018-06-22 | Stop reason: SDUPTHER

## 2018-02-14 RX ORDER — BENAZEPRIL HYDROCHLORIDE AND HYDROCHLOROTHIAZIDE 20; 12.5 MG/1; MG/1
1 TABLET ORAL DAILY
Qty: 90 TABLET | Refills: 1 | Status: SHIPPED | OUTPATIENT
Start: 2018-02-14 | End: 2019-01-11 | Stop reason: SDUPTHER

## 2018-04-16 RX ORDER — BENAZEPRIL HYDROCHLORIDE AND HYDROCHLOROTHIAZIDE 20; 12.5 MG/1; MG/1
TABLET ORAL
Qty: 90 TABLET | Refills: 1 | Status: SHIPPED | OUTPATIENT
Start: 2018-04-16 | End: 2018-06-22 | Stop reason: SDUPTHER

## 2018-04-16 RX ORDER — ATORVASTATIN CALCIUM 10 MG/1
TABLET, FILM COATED ORAL
Qty: 90 TABLET | Refills: 1 | Status: SHIPPED | OUTPATIENT
Start: 2018-04-16 | End: 2019-01-11 | Stop reason: SDUPTHER

## 2018-04-16 RX ORDER — LEVOTHYROXINE SODIUM 50 MCG
TABLET ORAL
Qty: 90 TABLET | Refills: 1 | Status: SHIPPED | OUTPATIENT
Start: 2018-04-16 | End: 2018-10-27 | Stop reason: SDUPTHER

## 2018-06-22 ENCOUNTER — OFFICE VISIT (OUTPATIENT)
Dept: INTERNAL MEDICINE | Facility: CLINIC | Age: 73
End: 2018-06-22

## 2018-06-22 ENCOUNTER — TELEPHONE (OUTPATIENT)
Dept: INTERNAL MEDICINE | Facility: CLINIC | Age: 73
End: 2018-06-22

## 2018-06-22 VITALS
DIASTOLIC BLOOD PRESSURE: 73 MMHG | TEMPERATURE: 97.1 F | HEIGHT: 64 IN | HEART RATE: 70 BPM | BODY MASS INDEX: 32.33 KG/M2 | WEIGHT: 189.4 LBS | OXYGEN SATURATION: 98 % | SYSTOLIC BLOOD PRESSURE: 136 MMHG

## 2018-06-22 DIAGNOSIS — R73.9 HYPERGLYCEMIA: ICD-10-CM

## 2018-06-22 DIAGNOSIS — R73.02 IMPAIRED GLUCOSE TOLERANCE: ICD-10-CM

## 2018-06-22 DIAGNOSIS — M15.9 GENERALIZED OSTEOARTHRITIS: ICD-10-CM

## 2018-06-22 DIAGNOSIS — Z00.00 MEDICARE ANNUAL WELLNESS VISIT, SUBSEQUENT: Primary | ICD-10-CM

## 2018-06-22 DIAGNOSIS — E55.9 VITAMIN D DEFICIENCY: ICD-10-CM

## 2018-06-22 DIAGNOSIS — I10 ESSENTIAL HYPERTENSION: ICD-10-CM

## 2018-06-22 DIAGNOSIS — E78.2 MIXED HYPERLIPIDEMIA: ICD-10-CM

## 2018-06-22 LAB
ALBUMIN SERPL-MCNC: 4.3 G/DL (ref 3.5–5.2)
ALBUMIN/GLOB SERPL: 1.7 G/DL
ALP SERPL-CCNC: 93 U/L (ref 39–117)
ALT SERPL-CCNC: 20 U/L (ref 1–33)
APPEARANCE UR: CLEAR
AST SERPL-CCNC: 18 U/L (ref 1–32)
BACTERIA #/AREA URNS HPF: NORMAL /HPF
BASOPHILS # BLD AUTO: 0.06 10*3/MM3 (ref 0–0.2)
BASOPHILS NFR BLD AUTO: 0.9 % (ref 0–1.5)
BILIRUB SERPL-MCNC: 0.4 MG/DL (ref 0.1–1.2)
BILIRUB UR QL STRIP: NEGATIVE
BUN SERPL-MCNC: 12 MG/DL (ref 8–23)
BUN/CREAT SERPL: 16 (ref 7–25)
CALCIUM SERPL-MCNC: 9.9 MG/DL (ref 8.6–10.5)
CASTS URNS MICRO: NORMAL
CHLORIDE SERPL-SCNC: 101 MMOL/L (ref 98–107)
CHOLEST SERPL-MCNC: 149 MG/DL (ref 0–200)
CO2 SERPL-SCNC: 29.9 MMOL/L (ref 22–29)
COLOR UR: YELLOW
CREAT SERPL-MCNC: 0.75 MG/DL (ref 0.57–1)
EOSINOPHIL # BLD AUTO: 0.23 10*3/MM3 (ref 0–0.7)
EOSINOPHIL NFR BLD AUTO: 3.4 % (ref 0.3–6.2)
EPI CELLS #/AREA URNS HPF: NORMAL /HPF
ERYTHROCYTE [DISTWIDTH] IN BLOOD BY AUTOMATED COUNT: 13.2 % (ref 11.7–13)
GFR SERPLBLD CREATININE-BSD FMLA CKD-EPI: 76 ML/MIN/1.73
GFR SERPLBLD CREATININE-BSD FMLA CKD-EPI: 92 ML/MIN/1.73
GLOBULIN SER CALC-MCNC: 2.6 GM/DL
GLUCOSE SERPL-MCNC: 91 MG/DL (ref 65–99)
GLUCOSE UR QL: NEGATIVE
HBA1C MFR BLD: 5.46 % (ref 4.8–5.6)
HCT VFR BLD AUTO: 43.5 % (ref 35.6–45.5)
HDLC SERPL-MCNC: 47 MG/DL (ref 40–60)
HGB BLD-MCNC: 14.2 G/DL (ref 11.9–15.5)
HGB UR QL STRIP: NEGATIVE
IMM GRANULOCYTES # BLD: 0 10*3/MM3 (ref 0–0.03)
IMM GRANULOCYTES NFR BLD: 0 % (ref 0–0.5)
KETONES UR QL STRIP: NEGATIVE
LDLC SERPL CALC-MCNC: 80 MG/DL (ref 0–100)
LDLC/HDLC SERPL: 1.7 {RATIO}
LEUKOCYTE ESTERASE UR QL STRIP: NEGATIVE
LYMPHOCYTES # BLD AUTO: 2.28 10*3/MM3 (ref 0.9–4.8)
LYMPHOCYTES NFR BLD AUTO: 33.6 % (ref 19.6–45.3)
MCH RBC QN AUTO: 30.3 PG (ref 26.9–32)
MCHC RBC AUTO-ENTMCNC: 32.6 G/DL (ref 32.4–36.3)
MCV RBC AUTO: 92.9 FL (ref 80.5–98.2)
MONOCYTES # BLD AUTO: 0.75 10*3/MM3 (ref 0.2–1.2)
MONOCYTES NFR BLD AUTO: 11.1 % (ref 5–12)
NEUTROPHILS # BLD AUTO: 3.46 10*3/MM3 (ref 1.9–8.1)
NEUTROPHILS NFR BLD AUTO: 51 % (ref 42.7–76)
NITRITE UR QL STRIP: NEGATIVE
PH UR STRIP: 6.5 [PH] (ref 5–8)
PLATELET # BLD AUTO: 322 10*3/MM3 (ref 140–500)
POTASSIUM SERPL-SCNC: 5.1 MMOL/L (ref 3.5–5.2)
PROT SERPL-MCNC: 6.9 G/DL (ref 6–8.5)
PROT UR QL STRIP: NEGATIVE
RBC # BLD AUTO: 4.68 10*6/MM3 (ref 3.9–5.2)
RBC #/AREA URNS HPF: NORMAL /HPF
SODIUM SERPL-SCNC: 143 MMOL/L (ref 136–145)
SP GR UR: 1.02 (ref 1–1.03)
T4 FREE SERPL-MCNC: 1.44 NG/DL (ref 0.93–1.7)
TRIGL SERPL-MCNC: 110 MG/DL (ref 0–150)
TSH SERPL DL<=0.005 MIU/L-ACNC: 2.48 MIU/ML (ref 0.27–4.2)
UROBILINOGEN UR STRIP-MCNC: (no result) MG/DL
VLDLC SERPL CALC-MCNC: 22 MG/DL (ref 5–40)
WBC # BLD AUTO: 6.78 10*3/MM3 (ref 4.5–10.7)
WBC #/AREA URNS HPF: NORMAL /HPF

## 2018-06-22 PROCEDURE — 99214 OFFICE O/P EST MOD 30 MIN: CPT | Performed by: INTERNAL MEDICINE

## 2018-06-22 PROCEDURE — G0439 PPPS, SUBSEQ VISIT: HCPCS | Performed by: INTERNAL MEDICINE

## 2018-06-22 RX ORDER — DOXYCYCLINE HYCLATE 100 MG/1
100 CAPSULE ORAL DAILY
COMMUNITY

## 2018-06-25 ENCOUNTER — TELEPHONE (OUTPATIENT)
Dept: INTERNAL MEDICINE | Facility: CLINIC | Age: 73
End: 2018-06-25

## 2018-06-25 DIAGNOSIS — Q89.9 DEFORMITY: Primary | ICD-10-CM

## 2018-06-28 RX ORDER — ALPRAZOLAM 0.5 MG/1
TABLET ORAL
Qty: 2 TABLET | Refills: 0 | Status: SHIPPED | OUTPATIENT
Start: 2018-06-28 | End: 2019-01-11

## 2018-07-01 NOTE — PROGRESS NOTES
Subjective   Sagrario Varma is a 72 y.o. female.   She is here today for Medicare wellness subsequent visit with her main recommendation weight loss along with follow-up for hypertension mixed hyperlipidemia vitamin D deficiency impaired glucose tolerance osteoarthritis in general and hyperglycemia and she has no complaints and everything is stable  History of Present Illness   She is here today for Medicare annual wellness visit subsequent with her main recommendation of weight loss along with follow-up for hypertension which is well-controlled on current medication mixed hyper lipidemia which is well-controlled on Lipitor vitamin D deficiency which has been stable on supplementation impaired glucose tolerance which has been stable but we'll get better with weight loss and osteoarthritis in general which is tolerable and pain controlled overall at this time and hyperglycemia and she has no complaints at this time and everything is stable  The following portions of the patient's history were reviewed and updated as appropriate: allergies, current medications, past family history, past medical history, past social history, past surgical history and problem list.    Review of Systems   All other systems reviewed and are negative.      Objective   Physical Exam   Constitutional: She is oriented to person, place, and time. She appears well-developed and well-nourished. She is cooperative.   HENT:   Head: Normocephalic and atraumatic.   Right Ear: Hearing, tympanic membrane, external ear and ear canal normal.   Left Ear: Hearing, tympanic membrane, external ear and ear canal normal.   Nose: Nose normal.   Mouth/Throat: Uvula is midline, oropharynx is clear and moist and mucous membranes are normal.   Eyes: Conjunctivae, EOM and lids are normal. Pupils are equal, round, and reactive to light.   Neck: Phonation normal. Neck supple. Carotid bruit is not present.   Cardiovascular: Normal rate, regular rhythm and normal  heart sounds.  Exam reveals no gallop and no friction rub.    No murmur heard.  Pulmonary/Chest: Effort normal and breath sounds normal. No respiratory distress.   Abdominal: Soft. Bowel sounds are normal. She exhibits no distension and no mass. There is no hepatosplenomegaly. There is no tenderness. There is no rebound and no guarding. No hernia.   Musculoskeletal: She exhibits no edema.   Neurological: She is alert and oriented to person, place, and time. Coordination and gait normal.   Skin: Skin is warm and dry.   Psychiatric: She has a normal mood and affect. Her speech is normal and behavior is normal. Judgment and thought content normal.   Nursing note and vitals reviewed.      Assessment/Plan   Diagnoses and all orders for this visit:    Medicare annual wellness visit, subsequent    Essential hypertension  -     CBC & Differential  -     Comprehensive Metabolic Panel  -     Urinalysis With Microscopic - Urine, Clean Catch    Mixed hyperlipidemia  -     Lipid Panel With LDL / HDL Ratio  -     T4, Free  -     TSH    Vitamin D deficiency    Impaired glucose tolerance  -     Hemoglobin A1c    Generalized osteoarthritis    Hyperglycemia    Other orders  -     Microscopic Examination    Medicare annual wellness visit subsequent are main recommendations weight loss and she understands needs to exercise more and cut back on calories  Hypertension well-controlled on current medication no changes needed  Mixed hyper lipidemia stable on Lipitor and we will encourage her to lose weight and exercise which will help improve her cholesterol numbers even more  Vitamin D deficiency stable supplementation and we will check this on a regular basis  Impaired glucose tolerance has been stable but with weight loss will improve and we will check hemoglobin A1c today  Osteoarthritis in general stable overall but will improve with weight loss  Hyperglycemia as mentioned above she has impaired glucose tolerance and we will check  hemoglobin A1c and encourage her lose weight with proper diet and exercise

## 2018-07-01 NOTE — PATIENT INSTRUCTIONS
Medicare Wellness  Personal Prevention Plan of Service     Date of Office Visit:  2018  Encounter Provider:  Beka Lara MD  Place of Service:  [facility] INTERNAL MEDICINE  Patient Name: Sagrario Varma  :  1945    As part of the Medicare Wellness portion of your visit today, we are providing you with this personalized preventive plan of services (PPPS). This plan is based upon recommendations of the [place] Preventive Services Task Force (USPSTF) and the Advisory Committee on Immunization Practices (ACIP).    This lists the preventive care services that should be considered, and provides dates of when you are due. Items listed as completed are up-to-date and do not require any further intervention.    Health Maintenance   Topic Date Due   • ZOSTER VACCINE (2 of 3) 2012   • INFLUENZA VACCINE  2018   • MEDICARE ANNUAL WELLNESS  2019   • LIPID PANEL  2019   • MAMMOGRAM  2020   • COLONOSCOPY  2027   • TDAP/TD VACCINES (3 - Td) 2028   • HEPATITIS C SCREENING  Addressed   • PNEUMOCOCCAL VACCINES (65+ LOW/MEDIUM RISK)  Completed       Orders Placed This Encounter   Procedures   • Hemoglobin A1c     Order Specific Question:   [facility] Has the patient fasted?     Answer:   Yes   • Lipid Panel With LDL / HDL Ratio     Order Specific Question:   [facility] Has the patient fasted?     Answer:   Yes   • Comprehensive Metabolic Panel     Order Specific Question:   [facility] Has the patient fasted?     Answer:   Yes   • T4, Free     Order Specific Question:   [facility] Has the patient fasted?     Answer:   Yes   • TSH     Order Specific Question:   [facility] Has the patient fasted?     Answer:   Yes   • Microscopic Examination     Order Specific Question:   [facility] Has the patient fasted?     Answer:   Yes   • CBC & Differential     Order Specific Question:   Manual Differential     Answer:   No     Order Specific Question:   [facility] Has the patient  fasted?     Answer:   Yes   • Urinalysis With Microscopic - Urine, Clean Catch     Order Specific Question:   [facility] Has the patient fasted?     Answer:   Yes       Return in about 6 months (around 12/22/2018) for Recheck.

## 2018-07-03 ENCOUNTER — APPOINTMENT (OUTPATIENT)
Dept: MRI IMAGING | Facility: HOSPITAL | Age: 73
End: 2018-07-03

## 2018-07-18 DIAGNOSIS — E04.1 THYROID NODULE: Primary | ICD-10-CM

## 2018-07-24 ENCOUNTER — HOSPITAL ENCOUNTER (OUTPATIENT)
Dept: ULTRASOUND IMAGING | Facility: HOSPITAL | Age: 73
Discharge: HOME | End: 2018-07-24
Admitting: INTERNAL MEDICINE

## 2018-07-24 DIAGNOSIS — E04.1 THYROID NODULE: ICD-10-CM

## 2018-07-24 PROCEDURE — 76536 US EXAM OF HEAD AND NECK: CPT

## 2018-09-05 NOTE — PROGRESS NOTES
Subjective .     REASONS FOR FOLLOWUP:    1. 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   2. Status post partial thyroidectomy.   3. Status post knee surgery.     HISTORY OF PRESENT ILLNESS:  The patient is a 72 y.o. year old female who is here for follow-up with the above-mentioned history.    History of Present Illness     The patient is a 67-year-old female with the above history, currently here for followup. She has completed 5 years of tamoxifen as of 2015.  She was initially diagnosed in .  She comes here yearly now.  She has a family history of sister having had breast cancer and is followed here by Dr. Moya.  She states that her sister was tested for genetics counseling and was negative.  Currently patient is asymptomatic.  Her bilateral screening mammogram 2018 which is negative.    PAST MEDICAL HISTORY:   4. Hypertension.   5. High cholesterol.   6. Multinodular goiter of the thyroid gland. She did undergo ultrasound of the thyroid gland and found multinodular goiter. She is followed by Dr. Restrepo for that. She has had a biopsy in the past which was negative.   7. Dilatation and curettage x2 for excessive bleeding.     OB/GYN HISTORY: She started menstrual period at age 12. She obtained menopause at age 56. She is  4, para 3, one stillborn. She has 3 children, two daughters and one son, 37, 35 and 28 year old with normal deliveries.     ONCOLOGIC HISTORY:      The patient was following up with yearly mammogram. She had her yearly mammogram done  10 by Dr. Napoles her primary care physician which showed calcifications in the right breast with additional evaluation and as a result she repeated the mammogram on 0 2010 which showed calcifications in the right breast which were suspicious in the anterior one-third of the right breast at the 12 o' clock position 2 cm away from the nipple.     Subsequently the patient underwent biopsy on 0 4/16/20 10 done at [facility], #[id number]. Path is consistent with ductal carcinoma in situ low to intermediate grade, solid and cribriform types, at least 5 mm in maximum dimension. There was no invasive carcinoma identified and there was some fibrocystic changes identified. Hormonal assays were performed, ER positive at 99%, MI positive at 99%, HER/2 was 1+ and Ki-67 was 6.      She subsequently was referred to Dr. Reid and MRI of the breast was also obtained. MRI of the breast 0 4/24/20 10 had shown post biopsy cavity within the right breast at the 12 o'clock position with an internal metallic clip but no suspicio us surrounding or residual enhancement seen in the right breast. There was no evidence of any axillary adenopathy. There were no suspicious findings on the left breast.      The patient then was admitted to [facility] at which time she underwent surgery by Dr. Reid 0 5/19/20 10. Pathology #[id number]. She underwent right breast lumpectomy with needle localization. The right breast lumpectomy showed breast tissue with single focus of atypical ductal hyperplasia adjacent to the biopsy site. Res idual carcinoma was not identified. There were some fibrocystic changes including ductal hyperplasia without atypia. The additional superior margin on the right breast showed duct hyperplasia without atypia and the deep margin also showed some duct hype rplasia without atypia.      As a result the patient was then referred to Dr. Sumeet Reddy for evaluation of brachytherapy. The patient then received brachytherapy with 10 total fractions given twice daily over a 5- treatment- day period and was prescribed 350 cGy per treatment fraction. The total dose she received was 3400 cGy in 10 fractions. Currently she is healed from surgery and is here for further recommendations.      Tamoxifen chemoprophylaxis started on 06/22/2010 to complete a total of 5  years.    Mammogram from 12/10/20 11 showed a new oval mass of 4.5 cm. She had a few round calcifications and postsurgical scar in the anterior one-third region of the right breast at the 12 o'clock positive. This area was thought to be consistent with seroma and pos tsurgical change, however, repeat mammogram was suggested in 6 months.        Dr. Reid aspirated 25 cc of clear fluid on 12/14/10 from the right side and the pathology, accession #[id number], was thought to have adipose stroma tissue, histiocytes and inflammat ory cells. No malignant cells were identified, consistent with fat necrosis. However, repeat mammogram and ultrasound are to be done because of the palpable mass, which is significantly large.      The patient underwent radiation 3400 cGy depth of 1 cm from the MammoSite balloon surface, given 10 fractions of 340 cGy each. Treatments were delivered for 5  treatment days, given twice daily. She completed her brachytherapy on 06/09/20 10.      The patient underwent mammogram on 05/17/2011. She was found to have a seroma in the right breast thought to be probably benign and she was to have bilateral yearly mammograms and ultrasound was suggested in  6 months but she has followup with Dr. Reid. He had done drainage of the right breast. The accession # is [id number] at [facility].   So the right breast fine needle aspiration basically showed some inflammatory cells. No malignant cells identified and hence he thought it was just a seroma and not to worry about.    Mammogram 12/14/2011 shows seroma in the right breast, probably benign, however, followup mammogram suggested in  6 months and followup ultrasound of the right breast is suggested in six months.      Mammogram done in June 2012 showed that she had seroma on the right breast, which was 21 mm with calcifications and postsurgical scar in the anterior one-third region of the right breast. It was felt that this is consistent with the seroma, as  it had decreased in size. A right breast ultrasound was also done and the ultrasound suggested an oval elongated seroma w ith thick margins, about 21 mm. No solid or suspicious lesions were seen, but a 6-month followup was suggested.      Mammogram on 2012 was negative.    Patient had mammogram on 2013 and it was negative.    Her mammogram done  4 is negative.    Mammogram 2018 negative      Current Outpatient Prescriptions on File Prior to Visit   Medication Sig Dispense Refill   • ALPRAZolam (XANAX) 0.5 MG tablet Take 1 to 2 tablets 1 hour before test procedure 2 tablet 0   • Ascorbic Acid (VITAMIN C) 500 MG capsule Take 1 capsule by mouth.     • aspirin 81 MG tablet Take 81 mg by mouth Daily.     • atorvastatin (LIPITOR) 10 MG tablet TAKE 1 TABLET DAILY 90 tablet 1   • benazepril-hydrochlorthiazide (LOTENSIN HCT) 20-12.5 MG per tablet Take 1 tablet by mouth Daily. 90 tablet 1   • Calcium 500-125 MG-UNIT tablet Take 1 tablet by mouth.     • Cholecalciferol (VITAMIN D) 1000 UNITS tablet Take 1,000 Units by mouth.     • Denosumab (PROLIA SC) Inject  under the skin Every 6 (Six) Months.     • doxycycline (VIBRAMYCIN) 100 MG capsule Take 100 mg by mouth Daily.     • ROSADAN 0.75 % (CREAM) kit      • SYNTHROID 50 MCG tablet TAKE 1 TABLET DAILY 90 tablet 1     No current facility-administered medications on file prior to visit.        ALLERGIES:     Allergies   Allergen Reactions   • Penicillins Hives and Swelling   • Morphine Nausea And Vomiting     SOCIAL HISTORY: She works as a . She does not smoke. She does not drink alcohol. She is  and lives with her .     FAMILY HISTORY: Father  at 57 with a blood clot. Mother  at 72 with a st roke. She has one brother who had melanoma as well as non-Hodgkin lymphoma at age 41; currently he is 65 years old and in good health. She had a sister with breast cancer at age 50. She is followed by our office. She is 57  now and in good health. Hakeem edwards's sister who had breast cancer many years ago now developed metastatic breast cancer, followed by Dr. Gold in our group.         Review of Systems  A comprehensive 14 point review of systems was performed and was negative except as mentioned.    Objective      There were no vitals filed for this visit.  Current Status 8/29/2017   ECOG score 0       Physical Exam    GENERAL:  Well-developed, well-nourished in no acute distress.   NECK:  Supple with good range of motion; no thyromegaly or masses, no JVD.  LYMPHATICS:  No cervical, supraclavicular, axillary or inguinal adenopathy.  CHEST:  Lungs clear to percussion and auscultation. Good airflow.  BREASTS: Right breast, no evidence of any breast masses, no nipple inversion, no skin thickening or erythema there is no evidence of right axillary adenopathy or right supraclavicular adenopathy.  Left breast: no evidence of any breast masses, no nipple inversion, no skin thickening or erythema there is no evidence of right axillary adenopathy or right supraclavicular adenopathy.    CARDIAC:  Regular rate and rhythm without murmurs, rubs or gallops. Normal S1,S2.  ABDOMEN:  Soft, nontender with no organomegaly or masses.  EXTREMITIES:  No clubbing, cyanosis or edema.  NEUROLOGICAL:  Cranial Nerves II-XII grossly intact.  No focal neurological deficits.  PSYCHIATRIC:  Normal affect and mood.      RECENT LABS:  Hematology WBC   Date Value Ref Range Status   12/14/2017 6.88 4.50 - 10.70 10*3/mm3 Final   08/29/2017 7.48 4.00 - 10.00 10*3/mm3 Final     RBC   Date Value Ref Range Status   06/22/2018 4.68 3.90 - 5.20 10*6/mm3 Final     Hemoglobin   Date Value Ref Range Status   06/22/2018 14.2 11.9 - 15.5 g/dL Final   08/29/2017 14.9 11.5 - 14.9 g/dL Final     Hematocrit   Date Value Ref Range Status   06/22/2018 43.5 35.6 - 45.5 % Final   08/29/2017 43.3 34.0 - 45.0 % Final     Platelets   Date Value Ref Range Status   06/22/2018 322 140 - 500  10*3/mm3 Final   08/29/2017 283 150 - 375 10*3/mm3 Final        Assessment/Plan     1. This is a patient with history of ductal carcinoma in situ; she completed tamoxifen 5 years. She is here for followup. S he is on a yearly mammogram. She has a family history of breast cancer and she prefers to come here once a year; hence, we will keep her once a year over here.  Patient is 8 years out and followed up yearly.    2.  Family history of breast cancer, he shouldn't sister has metastatic breast cancer with bone metastases and a brother who has history of non-Hodgkin's lymphoma 25 years ago and now with recurrence..    Follow-up in 1 year with labs.    MD Franklin Bassett Stephen J, MD

## 2018-09-06 ENCOUNTER — LAB (OUTPATIENT)
Dept: LAB | Facility: HOSPITAL | Age: 73
End: 2018-09-06

## 2018-09-06 ENCOUNTER — OFFICE VISIT (OUTPATIENT)
Dept: ONCOLOGY | Facility: CLINIC | Age: 73
End: 2018-09-06

## 2018-09-06 VITALS
HEIGHT: 64 IN | HEART RATE: 71 BPM | OXYGEN SATURATION: 99 % | WEIGHT: 188 LBS | BODY MASS INDEX: 32.1 KG/M2 | SYSTOLIC BLOOD PRESSURE: 144 MMHG | RESPIRATION RATE: 18 BRPM | TEMPERATURE: 97.7 F | DIASTOLIC BLOOD PRESSURE: 68 MMHG

## 2018-09-06 DIAGNOSIS — D05.10 DUCTAL CARCINOMA IN SITU (DCIS) OF BREAST, UNSPECIFIED LATERALITY: Primary | ICD-10-CM

## 2018-09-06 DIAGNOSIS — Z80.3 FAMILY HISTORY OF BREAST CANCER IN FEMALE: ICD-10-CM

## 2018-09-06 LAB
ALBUMIN SERPL-MCNC: 4.3 G/DL (ref 3.5–5.2)
ALBUMIN/GLOB SERPL: 1.5 G/DL (ref 1.1–2.4)
ALP SERPL-CCNC: 83 U/L (ref 38–116)
ALT SERPL W P-5'-P-CCNC: 19 U/L (ref 0–33)
ANION GAP SERPL CALCULATED.3IONS-SCNC: 8.7 MMOL/L
AST SERPL-CCNC: 21 U/L (ref 0–32)
BASOPHILS # BLD AUTO: 0.07 10*3/MM3 (ref 0–0.1)
BASOPHILS NFR BLD AUTO: 1 % (ref 0–1.1)
BILIRUB SERPL-MCNC: 0.6 MG/DL (ref 0.1–1.2)
BUN BLD-MCNC: 13 MG/DL (ref 6–20)
BUN/CREAT SERPL: 18.6 (ref 7.3–30)
CALCIUM SPEC-SCNC: 9.2 MG/DL (ref 8.5–10.2)
CHLORIDE SERPL-SCNC: 103 MMOL/L (ref 98–107)
CO2 SERPL-SCNC: 30.3 MMOL/L (ref 22–29)
CREAT BLD-MCNC: 0.7 MG/DL (ref 0.6–1.1)
DEPRECATED RDW RBC AUTO: 41.9 FL (ref 37–49)
EOSINOPHIL # BLD AUTO: 0.14 10*3/MM3 (ref 0–0.36)
EOSINOPHIL NFR BLD AUTO: 2.1 % (ref 1–5)
ERYTHROCYTE [DISTWIDTH] IN BLOOD BY AUTOMATED COUNT: 12.7 % (ref 11.7–14.5)
GFR SERPL CREATININE-BSD FRML MDRD: 82 ML/MIN/1.73
GLOBULIN UR ELPH-MCNC: 2.8 GM/DL (ref 1.8–3.5)
GLUCOSE BLD-MCNC: 94 MG/DL (ref 74–124)
HCT VFR BLD AUTO: 41.1 % (ref 34–45)
HGB BLD-MCNC: 13.6 G/DL (ref 11.5–14.9)
IMM GRANULOCYTES # BLD: 0.02 10*3/MM3 (ref 0–0.03)
IMM GRANULOCYTES NFR BLD: 0.3 % (ref 0–0.5)
LYMPHOCYTES # BLD AUTO: 2.08 10*3/MM3 (ref 1–3.5)
LYMPHOCYTES NFR BLD AUTO: 31 % (ref 20–49)
MCH RBC QN AUTO: 30.3 PG (ref 27–33)
MCHC RBC AUTO-ENTMCNC: 33.1 G/DL (ref 32–35)
MCV RBC AUTO: 91.5 FL (ref 83–97)
MONOCYTES # BLD AUTO: 0.69 10*3/MM3 (ref 0.25–0.8)
MONOCYTES NFR BLD AUTO: 10.3 % (ref 4–12)
NEUTROPHILS # BLD AUTO: 3.72 10*3/MM3 (ref 1.5–7)
NEUTROPHILS NFR BLD AUTO: 55.3 % (ref 39–75)
NRBC BLD MANUAL-RTO: 0 /100 WBC (ref 0–0)
PLATELET # BLD AUTO: 307 10*3/MM3 (ref 150–375)
PMV BLD AUTO: 9.7 FL (ref 8.9–12.1)
POTASSIUM BLD-SCNC: 5.2 MMOL/L (ref 3.5–4.7)
PROT SERPL-MCNC: 7.1 G/DL (ref 6.3–8)
RBC # BLD AUTO: 4.49 10*6/MM3 (ref 3.9–5)
SODIUM BLD-SCNC: 142 MMOL/L (ref 134–145)
WBC NRBC COR # BLD: 6.72 10*3/MM3 (ref 4–10)

## 2018-09-06 PROCEDURE — 36415 COLL VENOUS BLD VENIPUNCTURE: CPT | Performed by: INTERNAL MEDICINE

## 2018-09-06 PROCEDURE — 85025 COMPLETE CBC W/AUTO DIFF WBC: CPT | Performed by: INTERNAL MEDICINE

## 2018-09-06 PROCEDURE — 80053 COMPREHEN METABOLIC PANEL: CPT | Performed by: INTERNAL MEDICINE

## 2018-09-06 PROCEDURE — 99213 OFFICE O/P EST LOW 20 MIN: CPT | Performed by: INTERNAL MEDICINE

## 2018-10-29 RX ORDER — LEVOTHYROXINE SODIUM 50 MCG
TABLET ORAL
Qty: 90 TABLET | Refills: 1 | Status: SHIPPED | OUTPATIENT
Start: 2018-10-29 | End: 2019-03-25 | Stop reason: SDUPTHER

## 2018-11-27 ENCOUNTER — PATIENT MESSAGE (OUTPATIENT)
Dept: INTERNAL MEDICINE | Facility: CLINIC | Age: 73
End: 2018-11-27

## 2019-01-07 ENCOUNTER — APPOINTMENT (OUTPATIENT)
Dept: WOMENS IMAGING | Facility: HOSPITAL | Age: 74
End: 2019-01-07

## 2019-01-07 PROCEDURE — 77063 BREAST TOMOSYNTHESIS BI: CPT | Performed by: RADIOLOGY

## 2019-01-07 PROCEDURE — 77067 SCR MAMMO BI INCL CAD: CPT | Performed by: RADIOLOGY

## 2019-01-11 ENCOUNTER — OFFICE VISIT (OUTPATIENT)
Dept: INTERNAL MEDICINE | Facility: CLINIC | Age: 74
End: 2019-01-11

## 2019-01-11 VITALS
SYSTOLIC BLOOD PRESSURE: 136 MMHG | TEMPERATURE: 98.2 F | BODY MASS INDEX: 31.41 KG/M2 | HEIGHT: 64 IN | RESPIRATION RATE: 16 BRPM | DIASTOLIC BLOOD PRESSURE: 74 MMHG | OXYGEN SATURATION: 96 % | WEIGHT: 184 LBS | HEART RATE: 79 BPM

## 2019-01-11 DIAGNOSIS — M75.02 ADHESIVE CAPSULITIS OF LEFT SHOULDER: Primary | ICD-10-CM

## 2019-01-11 DIAGNOSIS — R73.02 IMPAIRED GLUCOSE TOLERANCE: ICD-10-CM

## 2019-01-11 DIAGNOSIS — E78.2 MIXED HYPERLIPIDEMIA: ICD-10-CM

## 2019-01-11 DIAGNOSIS — I10 ESSENTIAL HYPERTENSION: ICD-10-CM

## 2019-01-11 DIAGNOSIS — M15.9 GENERALIZED OSTEOARTHRITIS: ICD-10-CM

## 2019-01-11 LAB
ALBUMIN SERPL-MCNC: 4.6 G/DL (ref 3.5–5.2)
ALBUMIN/GLOB SERPL: 1.6 G/DL
ALP SERPL-CCNC: 86 U/L (ref 39–117)
ALT SERPL-CCNC: 24 U/L (ref 1–33)
APPEARANCE UR: CLEAR
AST SERPL-CCNC: 22 U/L (ref 1–32)
BACTERIA #/AREA URNS HPF: ABNORMAL /HPF
BASOPHILS # BLD AUTO: 0.04 10*3/MM3 (ref 0–0.2)
BASOPHILS NFR BLD AUTO: 0.6 % (ref 0–1.5)
BILIRUB SERPL-MCNC: 0.4 MG/DL (ref 0.1–1.2)
BILIRUB UR QL STRIP: NEGATIVE
BUN SERPL-MCNC: 10 MG/DL (ref 8–23)
BUN/CREAT SERPL: 15.6 (ref 7–25)
CALCIUM SERPL-MCNC: 10 MG/DL (ref 8.6–10.5)
CASTS URNS MICRO: ABNORMAL
CHLORIDE SERPL-SCNC: 99 MMOL/L (ref 98–107)
CHOLEST SERPL-MCNC: 170 MG/DL (ref 0–200)
CO2 SERPL-SCNC: 29 MMOL/L (ref 22–29)
COLOR UR: YELLOW
CREAT SERPL-MCNC: 0.64 MG/DL (ref 0.57–1)
EOSINOPHIL # BLD AUTO: 0.2 10*3/MM3 (ref 0–0.7)
EOSINOPHIL NFR BLD AUTO: 2.9 % (ref 0.3–6.2)
EPI CELLS #/AREA URNS HPF: ABNORMAL /HPF
ERYTHROCYTE [DISTWIDTH] IN BLOOD BY AUTOMATED COUNT: 13.4 % (ref 11.7–13)
GLOBULIN SER CALC-MCNC: 2.9 GM/DL
GLUCOSE SERPL-MCNC: 87 MG/DL (ref 65–99)
GLUCOSE UR QL: NEGATIVE
HBA1C MFR BLD: 5.94 % (ref 4.8–5.6)
HCT VFR BLD AUTO: 46.6 % (ref 35.6–45.5)
HDLC SERPL-MCNC: 53 MG/DL (ref 40–60)
HGB BLD-MCNC: 14.8 G/DL (ref 11.9–15.5)
HGB UR QL STRIP: NEGATIVE
IMM GRANULOCYTES # BLD AUTO: 0.01 10*3/MM3 (ref 0–0.03)
IMM GRANULOCYTES NFR BLD AUTO: 0.1 % (ref 0–0.5)
KETONES UR QL STRIP: NEGATIVE
LDLC SERPL CALC-MCNC: 98 MG/DL (ref 0–100)
LDLC/HDLC SERPL: 1.85 {RATIO}
LEUKOCYTE ESTERASE UR QL STRIP: (no result)
LYMPHOCYTES # BLD AUTO: 2.2 10*3/MM3 (ref 0.9–4.8)
LYMPHOCYTES NFR BLD AUTO: 32.3 % (ref 19.6–45.3)
MCH RBC QN AUTO: 29.9 PG (ref 26.9–32)
MCHC RBC AUTO-ENTMCNC: 31.8 G/DL (ref 32.4–36.3)
MCV RBC AUTO: 94.1 FL (ref 80.5–98.2)
MONOCYTES # BLD AUTO: 0.64 10*3/MM3 (ref 0.2–1.2)
MONOCYTES NFR BLD AUTO: 9.4 % (ref 5–12)
NEUTROPHILS # BLD AUTO: 3.74 10*3/MM3 (ref 1.9–8.1)
NEUTROPHILS NFR BLD AUTO: 54.8 % (ref 42.7–76)
NITRITE UR QL STRIP: NEGATIVE
PH UR STRIP: 7 [PH] (ref 5–8)
PLATELET # BLD AUTO: 362 10*3/MM3 (ref 140–500)
POTASSIUM SERPL-SCNC: 4.5 MMOL/L (ref 3.5–5.2)
PROT SERPL-MCNC: 7.5 G/DL (ref 6–8.5)
PROT UR QL STRIP: NEGATIVE
RBC # BLD AUTO: 4.95 10*6/MM3 (ref 3.9–5.2)
RBC #/AREA URNS HPF: ABNORMAL /HPF
SODIUM SERPL-SCNC: 141 MMOL/L (ref 136–145)
SP GR UR: 1.02 (ref 1–1.03)
T4 FREE SERPL-MCNC: 1.58 NG/DL (ref 0.93–1.7)
TRIGL SERPL-MCNC: 96 MG/DL (ref 0–150)
TSH SERPL DL<=0.005 MIU/L-ACNC: 2.26 MIU/ML (ref 0.27–4.2)
UROBILINOGEN UR STRIP-MCNC: (no result) MG/DL
VLDLC SERPL CALC-MCNC: 19.2 MG/DL (ref 5–40)
WBC # BLD AUTO: 6.82 10*3/MM3 (ref 4.5–10.7)
WBC #/AREA URNS HPF: ABNORMAL /HPF

## 2019-01-11 PROCEDURE — 99214 OFFICE O/P EST MOD 30 MIN: CPT | Performed by: INTERNAL MEDICINE

## 2019-01-11 RX ORDER — ATORVASTATIN CALCIUM 10 MG/1
TABLET, FILM COATED ORAL
Qty: 90 TABLET | Refills: 1 | Status: SHIPPED | OUTPATIENT
Start: 2019-01-11 | End: 2019-06-08 | Stop reason: SDUPTHER

## 2019-01-11 RX ORDER — BENAZEPRIL HYDROCHLORIDE AND HYDROCHLOROTHIAZIDE 20; 12.5 MG/1; MG/1
TABLET ORAL
Qty: 90 TABLET | Refills: 1 | Status: SHIPPED | OUTPATIENT
Start: 2019-01-11 | End: 2019-04-12 | Stop reason: SDUPTHER

## 2019-01-17 ENCOUNTER — TELEPHONE (OUTPATIENT)
Dept: ONCOLOGY | Facility: CLINIC | Age: 74
End: 2019-01-17

## 2019-01-17 DIAGNOSIS — D05.10 DUCTAL CARCINOMA IN SITU (DCIS) OF BREAST, UNSPECIFIED LATERALITY: Primary | ICD-10-CM

## 2019-01-17 DIAGNOSIS — R92.8 ABNORMAL MAMMOGRAM OF LEFT BREAST: ICD-10-CM

## 2019-01-17 DIAGNOSIS — Z80.3 FAMILY HISTORY OF BREAST CANCER IN FEMALE: ICD-10-CM

## 2019-01-17 NOTE — PROGRESS NOTES
Diagnostic left mammo with spot compression views and ultra sound orders entered per v/o Dr Zavala.

## 2019-01-17 NOTE — TELEPHONE ENCOUNTER
----- Message from Ines Zavala MD sent at 1/16/2019  6:46 PM EST -----  Please schedule left diagnostic mammogram with spot compression views and ultrasound of left breast as the screening mammogram shows some abnormality.  Also make sure the patient sees me within 3 weeks.  Please have injured order these tests.  Please call the patient with these appointments  Ines Zavala MD

## 2019-01-18 ENCOUNTER — APPOINTMENT (OUTPATIENT)
Dept: WOMENS IMAGING | Facility: HOSPITAL | Age: 74
End: 2019-01-18

## 2019-01-18 PROCEDURE — G0279 TOMOSYNTHESIS, MAMMO: HCPCS | Performed by: RADIOLOGY

## 2019-01-18 PROCEDURE — 77065 DX MAMMO INCL CAD UNI: CPT | Performed by: RADIOLOGY

## 2019-01-18 PROCEDURE — 76641 ULTRASOUND BREAST COMPLETE: CPT | Performed by: RADIOLOGY

## 2019-01-21 NOTE — PROGRESS NOTES
Subjective   Sagrario Varma is a 73 y.o. female.   She is here today for regular follow-up for adhesive capsulitis of left shoulder hypertension mixed hyperlipidemia impaired glucose tolerance osteoarthritis in general  History of Present Illness   She is here today for regular follow-up for adhesive capsulitis of left shoulder which is getting worse as well as hypertension which is well-controlled on current medication mixed hyper lipidemia which is stable on current medication impaired glucose tolerance which is stable with no evidence of diabetes and osteoarthritis in general which is about the same  The following portions of the patient's history were reviewed and updated as appropriate: allergies, current medications, past family history, past medical history, past social history, past surgical history and problem list.    Review of Systems   Constitutional: Negative for fatigue.   Endocrine: Negative for polydipsia and polyuria.   Genitourinary: Negative for difficulty urinating.   Musculoskeletal: Positive for arthralgias.   Neurological: Negative for weakness.   Psychiatric/Behavioral: Negative for decreased concentration and dysphoric mood.   All other systems reviewed and are negative.      Objective   Physical Exam   Constitutional: She is oriented to person, place, and time. She appears well-developed and well-nourished. She is cooperative.   HENT:   Head: Normocephalic and atraumatic.   Right Ear: Hearing, tympanic membrane, external ear and ear canal normal.   Left Ear: Hearing, tympanic membrane, external ear and ear canal normal.   Nose: Nose normal.   Mouth/Throat: Uvula is midline, oropharynx is clear and moist and mucous membranes are normal.   Eyes: Conjunctivae, EOM and lids are normal. Pupils are equal, round, and reactive to light.   Neck: Phonation normal. Neck supple. Carotid bruit is not present.   Cardiovascular: Normal rate, regular rhythm and normal heart sounds. Exam reveals no gallop  and no friction rub.   No murmur heard.  Pulmonary/Chest: Effort normal and breath sounds normal. No respiratory distress.   Abdominal: Soft. Bowel sounds are normal. She exhibits no distension and no mass. There is no hepatosplenomegaly. There is no tenderness. There is no rebound and no guarding. No hernia.   Musculoskeletal: She exhibits tenderness (multiple joints in general). She exhibits no edema.        Left shoulder: She exhibits decreased range of motion, tenderness and pain.   Neurological: She is alert and oriented to person, place, and time. Coordination and gait normal.   Skin: Skin is warm and dry.   Psychiatric: She has a normal mood and affect. Her speech is normal and behavior is normal. Judgment and thought content normal.   Nursing note and vitals reviewed.      Assessment/Plan   Diagnoses and all orders for this visit:    Adhesive capsulitis of left shoulder  -     Ambulatory Referral to Orthopedic Surgery    Essential hypertension    Mixed hyperlipidemia  -     Lipid Panel With LDL / HDL Ratio  -     CBC & Differential  -     Comprehensive Metabolic Panel  -     T4, Free  -     TSH  -     Urinalysis With Microscopic - Urine, Clean Catch    Impaired glucose tolerance  -     Hemoglobin A1c    Generalized osteoarthritis    Other orders  -     Microscopic Examination -        Adhesive capsulitis of left shoulder getting worse not stable and we will have her see orthopedic surgery  Hypertension well-controlled current medication no changes  Mixed hyper lipidemia has been stable on current medication we will check lipid panel with liver enzymes  Impaired glucose tolerance stable up to this point with no diabetes we will check hemoglobin A1c  Osteoarthritis in general continue current medications for this

## 2019-02-02 ENCOUNTER — TELEPHONE (OUTPATIENT)
Dept: ONCOLOGY | Facility: CLINIC | Age: 74
End: 2019-02-02

## 2019-02-02 NOTE — TELEPHONE ENCOUNTER
I left a message on the patient's home number as well as cell phone that the ultrasound of the left breast was suspicious and she needed an ultrasound-guided aspiration.  The radiologist had actually contacted Dr. Reid's office apparently but so far I do not see that scheduled.  I will try to discuss with Dr. Reid tomorrow on Monday but in the meantime I have instructed our nurses to see if she can be scheduled with Dr. Reid as soon as possible so she can get ultrasound-guided aspiration of the left breast cyst.    Ines Zavala MD

## 2019-02-04 ENCOUNTER — TELEPHONE (OUTPATIENT)
Dept: ONCOLOGY | Facility: HOSPITAL | Age: 74
End: 2019-02-04

## 2019-02-04 NOTE — TELEPHONE ENCOUNTER
I called Dr. Reid's office and s/w Jose.  She said that Dr. Reid placed the bx orders on 1/23/19 and that Dr. Reid's  faxed the [facility] to [facility].  [facility] was then to get the bx scheduled and call the pt with the appt times, but it looks like pt was not scheduled yet.  Jose is going to f/u with [facility] and make sure this is taken care of.    I also gave her Dr. Zavala's cell phone number and requested that Dr. Reid call her regarding this pt.  Jose will give him the message.  Notified Wes and our appt desk that Dr. Reid's office was going to handle this situation and no need to place more orders or schedule anything at this time.

## 2019-02-04 NOTE — TELEPHONE ENCOUNTER
----- Message from Ines Zavala MD sent at 2/2/2019  2:44 PM EST -----  Please call the patient and let her know that the left breast had a cyst which was suspicious.  She was referred to Dr. Chiu, general surgeon for ultrasound-guided aspiration of this cyst.  I do not see that this was scheduled.  Can you please have her see Dr. Reid as soon as possible and see if his office can schedule for an ultrasound-guided cyst aspiration as it was suspicious.    Back if you can call Dr. Reid and have him call me on my cell phone regarding this patient I can also commented with him.  I have a left met a left a message on the patient's phone.    Ines Zavala MD

## 2019-02-05 ENCOUNTER — APPOINTMENT (OUTPATIENT)
Dept: WOMENS IMAGING | Facility: HOSPITAL | Age: 74
End: 2019-02-05

## 2019-02-05 PROCEDURE — 76942 ECHO GUIDE FOR BIOPSY: CPT | Performed by: RADIOLOGY

## 2019-02-05 PROCEDURE — 19000 PUNCTURE ASPIR CYST BREAST: CPT | Performed by: RADIOLOGY

## 2019-02-07 ENCOUNTER — TELEPHONE (OUTPATIENT)
Dept: GENERAL RADIOLOGY | Facility: HOSPITAL | Age: 74
End: 2019-02-07

## 2019-02-07 ENCOUNTER — TELEPHONE (OUTPATIENT)
Dept: ONCOLOGY | Facility: HOSPITAL | Age: 74
End: 2019-02-07

## 2019-02-07 NOTE — TELEPHONE ENCOUNTER
----- Message from Leah Rivera RN sent at 2/7/2019 11:46 AM EST -----  Pt will not make tomorrows appt. She is worries about freezing . Rain please reschedule.    Thanks

## 2019-02-07 NOTE — TELEPHONE ENCOUNTER
----- Message from Rosita Washington sent at 2/7/2019 10:53 AM EST -----  Contact: [phone]  Pt is calling to see if she needs to keep her appt tomorrow

## 2019-02-07 NOTE — TELEPHONE ENCOUNTER
Pt informed she needs to keep her appt with Dr. Zavala. Pt request to reschedule appt for another date. She is worried about possible freezing rain. Message sent to scheduling.

## 2019-02-08 ENCOUNTER — APPOINTMENT (OUTPATIENT)
Dept: LAB | Facility: HOSPITAL | Age: 74
End: 2019-02-08

## 2019-02-08 ENCOUNTER — APPOINTMENT (OUTPATIENT)
Dept: ONCOLOGY | Facility: CLINIC | Age: 74
End: 2019-02-08

## 2019-02-19 ENCOUNTER — TELEPHONE (OUTPATIENT)
Dept: ONCOLOGY | Facility: HOSPITAL | Age: 74
End: 2019-02-19

## 2019-02-19 NOTE — TELEPHONE ENCOUNTER
Regarding: FW: Visit Follow-Up Question  Contact: [phone]  Please notify patient she can see me this September 2018 as her recent cyst aspiration was negative.    Ines Zavala MD  ----- Message -----  From: Stephanie Knapp RN  Sent: 2/15/2019   3:41 PM  To: Ines Zavala MD  Subject: FW: Visit Follow-Up Question                     Dr SAAVEDRA,  Please advise and respond to the clinical pool.   Thanks  ----- Message -----  From: Sagrario Varma  Sent: 2/15/2019   3:39 PM  To: [facility] Clinical Pool  Subject: Visit Follow-Up Question                         ----- Message from Mychart, Generic sent at 2/15/2019  3:39 PM EST -----    My last visit with you was on Sept. 3,  2018.  As you know on my recent annual mammogram a cyst was seen and evaluated.  It was benign with no malignant cells identified.  The doctor recommended a mammogram in one year.  In light of this would would there be a need for an appointment with you earlier than September 2019?

## 2019-03-08 ENCOUNTER — OFFICE VISIT (OUTPATIENT)
Dept: ORTHOPEDIC SURGERY | Facility: CLINIC | Age: 74
End: 2019-03-08

## 2019-03-08 VITALS — TEMPERATURE: 98.2 F | WEIGHT: 184 LBS | BODY MASS INDEX: 31.41 KG/M2 | HEIGHT: 64 IN

## 2019-03-08 DIAGNOSIS — M25.512 CHRONIC LEFT SHOULDER PAIN: Primary | ICD-10-CM

## 2019-03-08 DIAGNOSIS — G89.29 CHRONIC LEFT SHOULDER PAIN: Primary | ICD-10-CM

## 2019-03-08 DIAGNOSIS — M19.019 ARTHRITIS OF SHOULDER: ICD-10-CM

## 2019-03-08 PROCEDURE — 73030 X-RAY EXAM OF SHOULDER: CPT | Performed by: ORTHOPAEDIC SURGERY

## 2019-03-08 PROCEDURE — 20610 DRAIN/INJ JOINT/BURSA W/O US: CPT | Performed by: ORTHOPAEDIC SURGERY

## 2019-03-08 PROCEDURE — 99204 OFFICE O/P NEW MOD 45 MIN: CPT | Performed by: ORTHOPAEDIC SURGERY

## 2019-03-08 RX ORDER — LIDOCAINE HYDROCHLORIDE 10 MG/ML
2 INJECTION, SOLUTION EPIDURAL; INFILTRATION; INTRACAUDAL; PERINEURAL
Status: COMPLETED | OUTPATIENT
Start: 2019-03-08 | End: 2019-03-08

## 2019-03-08 RX ORDER — METHYLPREDNISOLONE ACETATE 80 MG/ML
80 INJECTION, SUSPENSION INTRA-ARTICULAR; INTRALESIONAL; INTRAMUSCULAR; SOFT TISSUE
Status: COMPLETED | OUTPATIENT
Start: 2019-03-08 | End: 2019-03-08

## 2019-03-08 RX ADMIN — METHYLPREDNISOLONE ACETATE 80 MG: 80 INJECTION, SUSPENSION INTRA-ARTICULAR; INTRALESIONAL; INTRAMUSCULAR; SOFT TISSUE at 08:36

## 2019-03-08 RX ADMIN — LIDOCAINE HYDROCHLORIDE 2 ML: 10 INJECTION, SOLUTION EPIDURAL; INFILTRATION; INTRACAUDAL; PERINEURAL at 08:36

## 2019-03-08 NOTE — PROGRESS NOTES
Patient: Sagrario Varma    YOB: 1945    Medical Record Number: [id number]    Chief Complaints:  Left shoulder pain    History of Present Illness:     73 y.o. female patient who presents with a 15 month history of progressively worsening pain and dysfunction affecting the left shoulder.  She does not recall any injury, precipitating event or factor.  She reports moderate constant aching pain.  She reports associated intermittent stabbing pain.  She has not noticed any alleviating factors.  She has not had any formal treatment.  She is right-hand dominant.  She does report some motion loss in her left shoulder.    Allergies:   Allergies   Allergen Reactions   • Penicillins Hives and Swelling   • Morphine Nausea And Vomiting       Home Medications:    Current Outpatient Medications:   •  Ascorbic Acid (VITAMIN C) 500 MG capsule, Take 1 capsule by mouth., Disp: , Rfl:   •  aspirin 81 MG tablet, Take 81 mg by mouth Daily., Disp: , Rfl:   •  atorvastatin (LIPITOR) 10 MG tablet, TAKE 1 TABLET DAILY, Disp: 90 tablet, Rfl: 1  •  benazepril-hydrochlorthiazide (LOTENSIN HCT) 20-12.5 MG per tablet, TAKE 1 TABLET DAILY, Disp: 90 tablet, Rfl: 1  •  Calcium 500-125 MG-UNIT tablet, Take 1 tablet by mouth., Disp: , Rfl:   •  Cholecalciferol (VITAMIN D) 1000 UNITS tablet, Take 1,000 Units by mouth., Disp: , Rfl:   •  Denosumab (PROLIA SC), Inject  under the skin Every 6 (Six) Months., Disp: , Rfl:   •  doxycycline (VIBRAMYCIN) 100 MG capsule, Take 100 mg by mouth Daily., Disp: , Rfl:   •  ROSADAN 0.75 % (CREAM) kit, , Disp: , Rfl:   •  SYNTHROID 50 MCG tablet, TAKE 1 TABLET DAILY, Disp: 90 tablet, Rfl: 1    Past Medical History:   Diagnosis Date   • Cancer (CMS/HCC)    • DCIS (ductal carcinoma in situ)     right   • Fractures 1951    Left arm, 3 places   • HBP (high blood pressure)     hypertension   • Hypercholesterolemia    • Multinodular goiter     of thyroid gland   • Osteoarthritis        Past Surgical History:    Procedure Laterality Date   • APPENDECTOMY  01/1961   • BREAST LUMPECTOMY Right 05/2010   • DILATATION AND CURETTAGE      x2 for excessive bleeding   • KNEE SURGERY     • OVARIAN CYST SURGERY  01/1961   • THYROIDECTOMY         Social History     Occupational History   • Occupation: [employer]     Employer: RETIRED   Tobacco Use   • Smoking status: Never Smoker   • Smokeless tobacco: Never Used   Substance and Sexual Activity   • Alcohol use: No   • Drug use: No   • Sexual activity: Yes     Partners: Male     Birth control/protection: None      Social History     Social History Narrative   • Not on file       Family History   Problem Relation Age of Onset   • Hyperlipidemia Other    • Hypertension Other    • Stroke Other    • Kidney failure Other    • Diabetes Other    • Heart disease Other    • Melanoma Other    • Stroke Mother    • Thrombosis Father    • Breast cancer Sister 50        Metastatic   • Cancer Sister    • Melanoma Brother 41   • Hodgkin's lymphoma Brother 41        non-Hodgkin lymphoma   • Cancer Brother    • Cancer Paternal Aunt        Review of Systems:      Constitutional: Denies fever, shaking or chills   Eyes: Denies change in visual acuity   HEENT: Denies nasal congestion or sore throat   Respiratory: Denies cough or shortness of breath   Cardiovascular: Denies chest pain or edema  Endocrine: Denies tremors, palpitations, intolerance of heat or cold, polyuria, polydipsia.  GI: Denies abdominal pain, nausea, vomiting, bloody stools or diarrhea  : Denies frequency, urgency, incontinence, retention, or nocturia.  Musculoskeletal: Denies numbness, tingling or loss of motor function   Integument: Denies rash, lesion or ulceration   Neurologic: Denies headache or focal weakness, deficits  Heme: Denies spontaneous or excessive bleeding, epistaxis, hematuria, melena, fatigue, enlarged or tender lymph nodes.      All other pertinent positives and negatives as noted above in HPI.    Physical  "Exam: 73 y.o. female  Vitals:    03/08/19 0820   Temp: 98.2 °F (36.8 °C)   TempSrc: Temporal   Weight: 83.5 kg (184 lb)   Height: 162.6 cm (64\")     General:  Patient is awake and alert.  Appears in no acute distress or discomfort.    Psych:  Affect and demeanor are appropriate.    Eyes:  Conjunctiva and sclera appear grossly normal.  Eyes track well and EOM seem to be intact.    Ears:  No gross abnormalities.  Hearing adequate for the exam.    Cardiovascular:  Regular rate and rhythm.    Lungs:  Good chest expansion.  Breathing unlabored.    Spine:  Neck appears grossly normal.  No palpable masses or adenopathy.  Good motion.  Spurling's maneuver is negative for any shoulder or arm symptoms.    Extremities:  Skin is benign.  No obvious gross abnormalities about left shoulder.  No palpable masses or adenopathy.  Moderate tenderness noted over anterior glenohumeral joint and rotator interval.  Motion is to 135 of forward elevation, 30 of external rotation, minus 15 of horizontal abduction, low back internal rotation.  No instability.  Rotator cuff strength seems to be well preserved but the exam is limited due to patient discomfort with resisted active motion.  Good motor and sensory function in lower arm and hand.  Palpable radial pulse.  Brisk capillary refill.         Radiology:  AP, scapular Y, and axillary views of the left shoulder are ordered by myself and reviewed to evaluate the patient's complaint.  No comparison films are immediately available.  The x-rays show moderate glenohumeral osteoarthritis with joint space narrowing, osteophyte formation, and subchondral sclerosis.  The acromiohumeral interval measures normal.    Assessment/Plan:  Left shoulder osteoarthritis    We discussed treatment options in detail including conservative treatment versus surgical options.  Regarding conservative treatment, we discussed appropriate activity modifications, anti-inflammatories, injections, and physical therapy.  " We also discussed the option of an arthroplasty and all that would entail.  I have recommended that we start with a conservative approach and the patient agrees.    The patient has acknowledged understanding of the information and elected for an injection.  The risks, benefits and alternatives were discussed.  She consented.  I told her that I would like to see her back in about 6 months just to get repeat x-rays and make sure that her glenoid wear is not progressing.    Large Joint Arthrocentesis: L glenohumeral  Date/Time: 3/8/2019 8:36 AM  Consent given by: patient  Site marked: site marked  Timeout: Immediately prior to procedure a time out was called to verify the correct patient, procedure, equipment, support staff and site/side marked as required   Supporting Documentation  Indications: pain   Procedure Details  Location: shoulder - L glenohumeral  Preparation: Patient was prepped and draped in the usual sterile fashion  Needle gauge: 21 G.  Approach: anterior  Medications administered: 80 mg methylPREDNISolone acetate 80 MG/ML; 2 mL lidocaine PF 1% 1 %  Patient tolerance: patient tolerated the procedure well with no immediate complications        Juan Galeas MD    03/08/2019    CC to Beka Lara MD

## 2019-03-25 RX ORDER — LEVOTHYROXINE SODIUM 50 MCG
TABLET ORAL
Qty: 90 TABLET | Refills: 1 | Status: SHIPPED | OUTPATIENT
Start: 2019-03-25 | End: 2019-10-03 | Stop reason: SDUPTHER

## 2019-04-12 RX ORDER — BENAZEPRIL HYDROCHLORIDE AND HYDROCHLOROTHIAZIDE 20; 12.5 MG/1; MG/1
TABLET ORAL
Qty: 90 TABLET | Refills: 1 | Status: SHIPPED | OUTPATIENT
Start: 2019-04-12 | End: 2019-12-11 | Stop reason: SDUPTHER

## 2019-06-10 RX ORDER — ATORVASTATIN CALCIUM 10 MG/1
TABLET, FILM COATED ORAL
Qty: 30 TABLET | Refills: 0 | Status: SHIPPED | OUTPATIENT
Start: 2019-06-10 | End: 2019-08-29 | Stop reason: SDUPTHER

## 2019-06-18 ENCOUNTER — OFFICE VISIT (OUTPATIENT)
Dept: FAMILY MEDICINE CLINIC | Facility: CLINIC | Age: 74
End: 2019-06-18

## 2019-06-18 VITALS
HEART RATE: 77 BPM | WEIGHT: 185 LBS | DIASTOLIC BLOOD PRESSURE: 76 MMHG | RESPIRATION RATE: 16 BRPM | BODY MASS INDEX: 31.58 KG/M2 | HEIGHT: 64 IN | TEMPERATURE: 97.8 F | SYSTOLIC BLOOD PRESSURE: 142 MMHG | OXYGEN SATURATION: 96 %

## 2019-06-18 DIAGNOSIS — E04.2 NON-TOXIC MULTINODULAR GOITER: ICD-10-CM

## 2019-06-18 DIAGNOSIS — E78.2 MIXED HYPERLIPIDEMIA: ICD-10-CM

## 2019-06-18 DIAGNOSIS — I10 ESSENTIAL HYPERTENSION: Primary | ICD-10-CM

## 2019-06-18 LAB
ALBUMIN SERPL-MCNC: 4.4 G/DL (ref 3.5–5.2)
ALBUMIN/GLOB SERPL: 1.6 G/DL
ALP SERPL-CCNC: 106 U/L (ref 39–117)
ALT SERPL-CCNC: 24 U/L (ref 1–33)
AST SERPL-CCNC: 22 U/L (ref 1–32)
BILIRUB SERPL-MCNC: 0.4 MG/DL (ref 0.2–1.2)
BUN SERPL-MCNC: 11 MG/DL (ref 8–23)
BUN/CREAT SERPL: 19 (ref 7–25)
CALCIUM SERPL-MCNC: 9.8 MG/DL (ref 8.6–10.5)
CHLORIDE SERPL-SCNC: 101 MMOL/L (ref 98–107)
CHOLEST SERPL-MCNC: 149 MG/DL (ref 0–200)
CO2 SERPL-SCNC: 31 MMOL/L (ref 22–29)
CREAT SERPL-MCNC: 0.58 MG/DL (ref 0.57–1)
GLOBULIN SER CALC-MCNC: 2.7 GM/DL
GLUCOSE SERPL-MCNC: 88 MG/DL (ref 65–99)
HDLC SERPL-MCNC: 51 MG/DL (ref 40–60)
LDLC SERPL CALC-MCNC: 81 MG/DL (ref 0–100)
POTASSIUM SERPL-SCNC: 5.5 MMOL/L (ref 3.5–5.2)
PROT SERPL-MCNC: 7.1 G/DL (ref 6–8.5)
SODIUM SERPL-SCNC: 142 MMOL/L (ref 136–145)
TRIGL SERPL-MCNC: 84 MG/DL (ref 0–150)
TSH SERPL DL<=0.005 MIU/L-ACNC: 2.68 MIU/ML (ref 0.27–4.2)
VLDLC SERPL CALC-MCNC: 16.8 MG/DL

## 2019-06-18 PROCEDURE — 99203 OFFICE O/P NEW LOW 30 MIN: CPT | Performed by: FAMILY MEDICINE

## 2019-06-18 RX ORDER — ZOSTER VACCINE RECOMBINANT, ADJUVANTED 50 MCG/0.5
KIT INTRAMUSCULAR
COMMUNITY
Start: 2019-06-14 | End: 2020-01-28

## 2019-06-18 NOTE — PROGRESS NOTES
Subjective   Sagrario Varma is a 73 y.o. female.     73-year-old female who is a new patient to me presents today to discuss her hypertension and hyperlipidemia.    Past medical history of hyperlipidemia, hypertension, vitamin D deficiency, nontoxic multinodular goiter, impaired glucose tolerance, generalized osteoarthritis, adhesive capsulitis of left shoulder.    Hypertension: Patient is on benazepril-hydrochlorothiazide 20-12.5 mg a day.  Has been on this for a few years. Her blood pressure today is 142/76.  Blood pressure ranges at home are 120s/70s. Reports some anxiety with being in the doctor's office.  Patient denies chest pain shortness of breath headache with vision changes.    Hyperlipidemia: Patient is on atorvastatin 10 mg a day. Lipid panel was last checked in January 2019 and was all normal.    Hypothyroidism 2/2 thyroidectomy due to multinodular goiter: Patient is on Synthroid 50 mcg a day. TSH and free T4 were checked in January 2019 were normal. ENT doctor Dr Gtz had done US of her thyroid this year which was fine.     Patient is due for Medicare wellness visit on June 22, 2019.  She is up-to-date on colonoscopy; last done 1/2017 and was told to repeat in 5 years, Tdap, Zostavax, pneumonia vaccines. Got 1 of the Shingrex a few days ago. Had a breast biopsy benign in Feb 2019. Next due for mammogram in 1 year; ordered by Gyn.    Had DCIS breast cancer 9 years ago. Follows with Dr. Zavala of Oncology once a year.     H/o esophageal diverticulum when she was 43 had an abscess develop was in the hospital. Was given 10 days of IV abx and infection resolved.    Rosacea: Takes Doxycycline 40 mg a day; Rx'd by her Dermatologist.     2 bumps on R side of back of neck. No pain or erythema. X 1 week.     Is due for her next Prolia shot; prescribed by her Gynecologist Dr. Napoles. They will get a DEXA scan the day of the shot as well. Has been getting Prolia for 2 years. Needs Ca done today.    Has had  annual carotid US done. Will check when she is next due; thinks she had it done in the past few months.              The following portions of the patient's history were reviewed and updated as appropriate: allergies, current medications, past family history, past medical history, past social history, past surgical history and problem list.    Review of Systems   Constitutional: Negative for activity change, appetite change, chills and fever.   HENT: Negative for congestion, sinus pressure and sore throat.    Eyes: Negative for blurred vision and double vision.   Respiratory: Negative for cough, chest tightness and shortness of breath.    Cardiovascular: Negative for chest pain.   Gastrointestinal: Negative for abdominal pain, blood in stool, constipation and diarrhea.   Genitourinary: Negative for dysuria.   Skin: Positive for skin lesions.   Neurological: Negative for dizziness and headache.   Psychiatric/Behavioral: Negative for sleep disturbance, suicidal ideas and stress. The patient is not nervous/anxious.        Objective   Physical Exam   Constitutional: She is oriented to person, place, and time. She appears well-developed and well-nourished.   HENT:   Head: Normocephalic and atraumatic.   Right Ear: Hearing, tympanic membrane, external ear and ear canal normal.   Left Ear: Hearing, tympanic membrane, external ear and ear canal normal.   Nose: Nose normal.   Mouth/Throat: Uvula is midline and mucous membranes are normal. No oropharyngeal exudate, posterior oropharyngeal edema, posterior oropharyngeal erythema or tonsillar abscesses.   Eyes: Conjunctivae and EOM are normal. Pupils are equal, round, and reactive to light.   Cardiovascular: Normal rate and regular rhythm.   Pulmonary/Chest: Effort normal and breath sounds normal. No respiratory distress. She has no decreased breath sounds.   Musculoskeletal: Normal range of motion.   Lymphadenopathy:        Right cervical: No superficial cervical adenopathy  present.       Left cervical: No superficial cervical adenopathy present.   Neurological: She is alert and oriented to person, place, and time.   Psychiatric: She has a normal mood and affect. Her speech is normal.               Assessment/Plan     Sagrario was seen today for establish care, hypertension and hyperlipidemia.    Diagnoses and all orders for this visit:    Essential hypertension  -     Comprehensive Metabolic Panel    Mixed hyperlipidemia  -     Lipid Panel    Non-toxic multinodular goiter  -     TSH Rfx On Abnormal To Free T4      NEEDS RESULTS SENT TO DR. HAYDEN

## 2019-06-19 DIAGNOSIS — E87.5 SERUM POTASSIUM ELEVATED: Primary | ICD-10-CM

## 2019-06-19 NOTE — PROGRESS NOTES
Please inform patient potassium is slightly elevated at 5.5.  This can be transient.  I would like to recheck this in 1 week.    Thyroid panel and lipid panel are all normal.

## 2019-06-28 DIAGNOSIS — E87.5 SERUM POTASSIUM ELEVATED: ICD-10-CM

## 2019-06-29 LAB
BUN SERPL-MCNC: 13 MG/DL (ref 8–23)
BUN/CREAT SERPL: 20.6 (ref 7–25)
CALCIUM SERPL-MCNC: 9.7 MG/DL (ref 8.6–10.5)
CHLORIDE SERPL-SCNC: 100 MMOL/L (ref 98–107)
CO2 SERPL-SCNC: 31.6 MMOL/L (ref 22–29)
CREAT SERPL-MCNC: 0.63 MG/DL (ref 0.57–1)
GLUCOSE SERPL-MCNC: 109 MG/DL (ref 65–99)
POTASSIUM SERPL-SCNC: 4.9 MMOL/L (ref 3.5–5.2)
SODIUM SERPL-SCNC: 141 MMOL/L (ref 136–145)

## 2019-07-30 ENCOUNTER — OFFICE VISIT (OUTPATIENT)
Dept: FAMILY MEDICINE CLINIC | Facility: CLINIC | Age: 74
End: 2019-07-30

## 2019-07-30 VITALS
WEIGHT: 187 LBS | HEART RATE: 80 BPM | OXYGEN SATURATION: 98 % | TEMPERATURE: 98.2 F | HEIGHT: 64 IN | RESPIRATION RATE: 18 BRPM | SYSTOLIC BLOOD PRESSURE: 154 MMHG | DIASTOLIC BLOOD PRESSURE: 92 MMHG | BODY MASS INDEX: 31.92 KG/M2

## 2019-07-30 DIAGNOSIS — Z09 FOLLOW-UP FOR RESOLVED CONDITION: Primary | ICD-10-CM

## 2019-07-30 PROBLEM — M79.602 ARM PAIN, LEFT: Status: ACTIVE | Noted: 2017-12-01
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
## 2019-07-30 PROCEDURE — 99213 OFFICE O/P EST LOW 20 MIN: CPT | Performed by: FAMILY MEDICINE

## 2019-07-30 NOTE — PROGRESS NOTES
Subjective   Sagrario Varma is a 73 y.o. female.     73-year-old female presents today to follow-up on 2 bumps on the right side of the back of her neck.  At last visit discussed that they have been noticeable at that time for about 1 week.  Today patient reports these have now resolved.  Denies any bumps at all around the base of her neck or on her neck.  Denies any swelling tenderness to palpation or drainage from anywhere either.  Denies fevers chills night sweats..             The following portions of the patient's history were reviewed and updated as appropriate: allergies, current medications, past family history, past medical history, past social history, past surgical history and problem list.    Review of Systems   Constitutional: Negative for chills and fever.   Skin: Negative for color change, dry skin, pallor, rash, skin lesions and bruise.       Objective   Physical Exam   Constitutional: She appears well-developed and well-nourished.   HENT:   Head: Normocephalic and atraumatic.   Eyes: Conjunctivae and EOM are normal. Pupils are equal, round, and reactive to light.   Neck: Normal range of motion. Neck supple.   Pulmonary/Chest: Effort normal.   Psychiatric: She has a normal mood and affect.               Assessment/Plan     Sagrario was seen today for shoulder.    Diagnoses and all orders for this visit:    Follow-up for resolved condition    Bumps at the base of her neck resolved; likely they were bug bites.    Of note on exam noticed what looks like an enlarged area/bump medial clavicle on the right side.  Patient reports that she was told that several years ago this was worked up and was deemed to be a clavicle fracture that healed.  No change in how it looks or feels.  No tenderness to palpation.  No appetite changes or weight changes.  Patient aware of this changes at all in size or color shape or in terms of tenderness to palpation she is to return to clinic right away.    Return to clinic in 6  months for follow-up or sooner if needed.

## 2019-08-29 DIAGNOSIS — E78.2 MIXED HYPERLIPIDEMIA: Primary | ICD-10-CM

## 2019-08-29 RX ORDER — ATORVASTATIN CALCIUM 10 MG/1
10 TABLET, FILM COATED ORAL DAILY
Qty: 30 TABLET | Refills: 3 | Status: SHIPPED | OUTPATIENT
Start: 2019-08-29 | End: 2019-09-04 | Stop reason: SDUPTHER

## 2019-09-04 ENCOUNTER — TELEPHONE (OUTPATIENT)
Dept: FAMILY MEDICINE CLINIC | Facility: CLINIC | Age: 74
End: 2019-09-04

## 2019-09-04 DIAGNOSIS — E78.2 MIXED HYPERLIPIDEMIA: ICD-10-CM

## 2019-09-04 RX ORDER — ATORVASTATIN CALCIUM 10 MG/1
10 TABLET, FILM COATED ORAL DAILY
Qty: 90 TABLET | Refills: 0 | Status: SHIPPED | OUTPATIENT
Start: 2019-09-04 | End: 2019-11-21 | Stop reason: SDUPTHER

## 2019-09-04 NOTE — TELEPHONE ENCOUNTER
Pt called stating that atorvastatin was sent to [facility] instead of [facility].  Pt only received 30 day supply.  Asking for 90 day to mail order.

## 2019-09-13 ENCOUNTER — OFFICE VISIT (OUTPATIENT)
Dept: ORTHOPEDIC SURGERY | Facility: CLINIC | Age: 74
End: 2019-09-13

## 2019-09-13 VITALS — HEIGHT: 64 IN | BODY MASS INDEX: 31.58 KG/M2 | TEMPERATURE: 98.7 F | WEIGHT: 185 LBS

## 2019-09-13 DIAGNOSIS — G89.29 CHRONIC LEFT SHOULDER PAIN: Primary | ICD-10-CM

## 2019-09-13 DIAGNOSIS — M19.019 ARTHRITIS OF SHOULDER: ICD-10-CM

## 2019-09-13 DIAGNOSIS — M25.512 CHRONIC LEFT SHOULDER PAIN: Primary | ICD-10-CM

## 2019-09-13 PROCEDURE — 73030 X-RAY EXAM OF SHOULDER: CPT | Performed by: ORTHOPAEDIC SURGERY

## 2019-09-13 PROCEDURE — 20610 DRAIN/INJ JOINT/BURSA W/O US: CPT | Performed by: ORTHOPAEDIC SURGERY

## 2019-09-13 RX ORDER — METHYLPREDNISOLONE ACETATE 80 MG/ML
80 INJECTION, SUSPENSION INTRA-ARTICULAR; INTRALESIONAL; INTRAMUSCULAR; SOFT TISSUE
Status: COMPLETED | OUTPATIENT
Start: 2019-09-13 | End: 2019-09-13

## 2019-09-13 RX ORDER — AZELAIC ACID 0.15 G/G
GEL TOPICAL
COMMUNITY
End: 2021-09-09

## 2019-09-13 RX ADMIN — METHYLPREDNISOLONE ACETATE 80 MG: 80 INJECTION, SUSPENSION INTRA-ARTICULAR; INTRALESIONAL; INTRAMUSCULAR; SOFT TISSUE at 08:28

## 2019-09-13 NOTE — PROGRESS NOTES
Ms. Varma comes in for follow-up of her shoulder.  The last injection did help transiently.  She continues to have pain and popping with movement.  Her function is limited with the left arm.  Repeat AP, scapular Y and axillary views of the left shoulder are ordered and reviewed.  No comparison films are available at this time.  The x-rays show end-stage glenohumeral osteoarthritis with bone-on-bone degeneration, flattening of the humeral head, osteophyte formation and loose body formation.    We discussed her options including a possible arthroplasty.  She is simply not up for that at this time.  She wants to get the injection repeated.  The risk, benefits and alternatives were discussed.  Injection was performed as described below.    Large Joint Arthrocentesis: L subacromial bursa  Date/Time: 9/13/2019 8:28 AM  Consent given by: patient  Site marked: site marked  Timeout: Immediately prior to procedure a time out was called to verify the correct patient, procedure, equipment, support staff and site/side marked as required   Supporting Documentation  Indications: pain   Procedure Details  Location: shoulder - L subacromial bursa  Preparation: Patient was prepped and draped in the usual sterile fashion  Needle gauge: 21 G.  Approach: posterior  Medications administered: 2 mL lidocaine (cardiac); 80 mg methylPREDNISolone acetate 80 MG/ML  Patient tolerance: patient tolerated the procedure well with no immediate complications

## 2019-09-30 ENCOUNTER — OFFICE VISIT (OUTPATIENT)
Dept: ONCOLOGY | Facility: CLINIC | Age: 74
End: 2019-09-30

## 2019-09-30 ENCOUNTER — LAB (OUTPATIENT)
Dept: LAB | Facility: HOSPITAL | Age: 74
End: 2019-09-30

## 2019-09-30 VITALS
RESPIRATION RATE: 16 BRPM | WEIGHT: 187.6 LBS | HEIGHT: 64 IN | BODY MASS INDEX: 32.03 KG/M2 | SYSTOLIC BLOOD PRESSURE: 168 MMHG | TEMPERATURE: 98.3 F | OXYGEN SATURATION: 98 % | DIASTOLIC BLOOD PRESSURE: 84 MMHG | HEART RATE: 71 BPM

## 2019-09-30 DIAGNOSIS — D05.10 DUCTAL CARCINOMA IN SITU (DCIS) OF BREAST, UNSPECIFIED LATERALITY: Primary | ICD-10-CM

## 2019-09-30 LAB
ALBUMIN SERPL-MCNC: 4.3 G/DL (ref 3.5–5.2)
ALBUMIN/GLOB SERPL: 1.6 G/DL (ref 1.1–2.4)
ALP SERPL-CCNC: 86 U/L (ref 38–116)
ALT SERPL W P-5'-P-CCNC: 23 U/L (ref 0–33)
ANION GAP SERPL CALCULATED.3IONS-SCNC: 10.9 MMOL/L (ref 5–15)
AST SERPL-CCNC: 23 U/L (ref 0–32)
BASOPHILS # BLD AUTO: 0.05 10*3/MM3 (ref 0–0.2)
BASOPHILS NFR BLD AUTO: 0.8 % (ref 0–1.5)
BILIRUB SERPL-MCNC: 0.5 MG/DL (ref 0.2–1.2)
BUN BLD-MCNC: 12 MG/DL (ref 6–20)
BUN/CREAT SERPL: 16.9 (ref 7.3–30)
CALCIUM SPEC-SCNC: 9.3 MG/DL (ref 8.5–10.2)
CHLORIDE SERPL-SCNC: 100 MMOL/L (ref 98–107)
CO2 SERPL-SCNC: 29.1 MMOL/L (ref 22–29)
CREAT BLD-MCNC: 0.71 MG/DL (ref 0.6–1.1)
DEPRECATED RDW RBC AUTO: 43.5 FL (ref 37–54)
EOSINOPHIL # BLD AUTO: 0.19 10*3/MM3 (ref 0–0.4)
EOSINOPHIL NFR BLD AUTO: 2.9 % (ref 0.3–6.2)
ERYTHROCYTE [DISTWIDTH] IN BLOOD BY AUTOMATED COUNT: 13 % (ref 12.3–15.4)
GFR SERPL CREATININE-BSD FRML MDRD: 81 ML/MIN/1.73
GLOBULIN UR ELPH-MCNC: 2.7 GM/DL (ref 1.8–3.5)
GLUCOSE BLD-MCNC: 88 MG/DL (ref 74–124)
HCT VFR BLD AUTO: 42.7 % (ref 34–46.6)
HGB BLD-MCNC: 14 G/DL (ref 12–15.9)
IMM GRANULOCYTES # BLD AUTO: 0.01 10*3/MM3 (ref 0–0.05)
IMM GRANULOCYTES NFR BLD AUTO: 0.2 % (ref 0–0.5)
LYMPHOCYTES # BLD AUTO: 1.93 10*3/MM3 (ref 0.7–3.1)
LYMPHOCYTES NFR BLD AUTO: 29.9 % (ref 19.6–45.3)
MCH RBC QN AUTO: 30.2 PG (ref 26.6–33)
MCHC RBC AUTO-ENTMCNC: 32.8 G/DL (ref 31.5–35.7)
MCV RBC AUTO: 92 FL (ref 79–97)
MONOCYTES # BLD AUTO: 0.66 10*3/MM3 (ref 0.1–0.9)
MONOCYTES NFR BLD AUTO: 10.2 % (ref 5–12)
NEUTROPHILS # BLD AUTO: 3.62 10*3/MM3 (ref 1.7–7)
NEUTROPHILS NFR BLD AUTO: 56 % (ref 42.7–76)
NRBC BLD AUTO-RTO: 0 /100 WBC (ref 0–0.2)
PLATELET # BLD AUTO: 260 10*3/MM3 (ref 140–450)
PMV BLD AUTO: 9.8 FL (ref 6–12)
POTASSIUM BLD-SCNC: 4.5 MMOL/L (ref 3.5–4.7)
PROT SERPL-MCNC: 7 G/DL (ref 6.3–8)
RBC # BLD AUTO: 4.64 10*6/MM3 (ref 3.77–5.28)
SODIUM BLD-SCNC: 140 MMOL/L (ref 134–145)
WBC NRBC COR # BLD: 6.46 10*3/MM3 (ref 3.4–10.8)

## 2019-09-30 PROCEDURE — 99213 OFFICE O/P EST LOW 20 MIN: CPT | Performed by: INTERNAL MEDICINE

## 2019-09-30 PROCEDURE — G0463 HOSPITAL OUTPT CLINIC VISIT: HCPCS | Performed by: INTERNAL MEDICINE

## 2019-09-30 PROCEDURE — 85025 COMPLETE CBC W/AUTO DIFF WBC: CPT

## 2019-09-30 PROCEDURE — 80053 COMPREHEN METABOLIC PANEL: CPT

## 2019-09-30 PROCEDURE — 36415 COLL VENOUS BLD VENIPUNCTURE: CPT

## 2019-09-30 NOTE — PROGRESS NOTES
Subjective .     REASONS FOR FOLLOWUP:    1. 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   2. Status post partial thyroidectomy.   3. Status post knee surgery.     HISTORY OF PRESENT ILLNESS:  The patient is a 73 y.o. year old female who is here for follow-up with the above-mentioned history.    History of Present Illness     The patient is a 67-year-old female with the above history, currently here for followup. She has completed 5 years of tamoxifen as of 2015.  She was initially diagnosed in .  She comes here yearly now.  She has a family history of sister having had breast cancer and is followed here by Dr. Moya.  She states that her sister was tested for genetics counseling and was negative.  Currently patient is asymptomatic.  Her bilateral screening mammogram 2018 which is negative.    Patient reports feeling well overall.  She had her biopsies done and the pathology report was benign.      Her labs from 19 show no concerns.  Her last mammogram was in 2019 which did not show any malignancy.      PAST MEDICAL HISTORY:   4. Hypertension.   5. High cholesterol.   6. Multinodular goiter of the thyroid gland. She did undergo ultrasound of the thyroid gland and found multinodular goiter. She is followed by Dr. Restrepo for that. She has had a biopsy in the past which was negative.   7. Dilatation and curettage x2 for excessive bleeding.     OB/GYN HISTORY: She started menstrual period at age 12. She obtained menopause at age 56. She is  4, para 3, one stillborn. She has 3 children, two daughters and one son, 37, 35 and 28 year old with normal deliveries.     ONCOLOGIC HISTORY:      The patient was following up with yearly mammogram. She had her yearly mammogram done  10 by Dr. Napoles her primary care physician which showed calcifications in the right breast with additional evaluation  and as a result she repeated the mammogram on 0 4/05/ 2010 which showed calcifications in the right breast which were suspicious in the anterior one-third of the right breast at the 12 o' clock position 2 cm away from the nipple.    Subsequently the patient underwent biopsy on 0 4/16/20 10 done at [facility], #[id number]. Path is consistent with ductal carcinoma in situ low to intermediate grade, solid and cribriform types, at least 5 mm in maximum dimension. There was no invasive carcinoma identified and there was some fibrocystic changes identified. Hormonal assays were performed, ER positive at 99%, NV positive at 99%, HER/2 was 1+ and Ki-67 was 6.      She subsequently was referred to Dr. Reid and MRI of the breast was also obtained. MRI of the breast 0 4/24/20 10 had shown post biopsy cavity within the right breast at the 12 o'clock position with an internal metallic clip but no suspicio us surrounding or residual enhancement seen in the right breast. There was no evidence of any axillary adenopathy. There were no suspicious findings on the left breast.      The patient then was admitted to [facility] at which time she underwent surgery by Dr. Reid 0 5/19/20 10. Pathology #[id number]. She underwent right breast lumpectomy with needle localization. The right breast lumpectomy showed breast tissue with single focus of atypical ductal hyperplasia adjacent to the biopsy site. Res idual carcinoma was not identified. There were some fibrocystic changes including ductal hyperplasia without atypia. The additional superior margin on the right breast showed duct hyperplasia without atypia and the deep margin also showed some duct hype rplasia without atypia.      As a result the patient was then referred to Dr. Sumeet Reddy for evaluation of brachytherapy. The patient then received brachytherapy with 10 total fractions given twice daily over a 5- treatment- day period and was prescribed 350 cGy per  treatment fraction. The total dose she received was 3400 cGy in 10 fractions. Currently she is healed from surgery and is here for further recommendations.      Tamoxifen chemoprophylaxis started on 06/22/2010 to complete a total of 5 years.    Mammogram from 12/10/20 11 showed a new oval mass of 4.5 cm. She had a few round calcifications and postsurgical scar in the anterior one-third region of the right breast at the 12 o'clock positive. This area was thought to be consistent with seroma and pos tsurgical change, however, repeat mammogram was suggested in 6 months.        Dr. Reid aspirated 25 cc of clear fluid on 12/14/10 from the right side and the pathology, accession #[id number], was thought to have adipose stroma tissue, histiocytes and inflammat ory cells. No malignant cells were identified, consistent with fat necrosis. However, repeat mammogram and ultrasound are to be done because of the palpable mass, which is significantly large.      The patient underwent radiation 3400 cGy depth of 1 cm from the MammoSite balloon surface, given 10 fractions of 340 cGy each. Treatments were delivered for 5  treatment days, given twice daily. She completed her brachytherapy on 06/09/20 10.      The patient underwent mammogram on 05/17/2011. She was found to have a seroma in the right breast thought to be probably benign and she was to have bilateral yearly mammograms and ultrasound was suggested in  6 months but she has followup with Dr. Reid. He had done drainage of the right breast. The accession # is [id number] at [facility].   So the right breast fine needle aspiration basically showed some inflammatory cells. No malignant cells identified and hence he thought it was just a seroma and not to worry about.    Mammogram 12/14/2011 shows seroma in the right breast, probably benign, however, followup mammogram suggested in  6 months and followup ultrasound of the right breast is suggested in six months.      Mammogram  done in June 2012 showed that she had seroma on the right breast, which was 21 mm with calcifications and postsurgical scar in the anterior one-third region of the right breast. It was felt that this is consistent with the seroma, as it had decreased in size. A right breast ultrasound was also done and the ultrasound suggested an oval elongated seroma w ith thick margins, about 21 mm. No solid or suspicious lesions were seen, but a 6-month followup was suggested.      Mammogram on 12/13/2012 was negative.    Patient had mammogram on 12/16/2013 and it was negative.    Her mammogram done 12/29/201 4 is negative.    Mammogram January 2018 negative      February 5, 2019: Left breast biopsy at 12:30 position fine-needle aspiration no malignant cells seen.  Cytology features consistent with benign fibrocystic changes.      Current Outpatient Medications on File Prior to Visit   Medication Sig Dispense Refill   • Aflibercept (EYLEA) 2 MG/0.05ML solution      • Ascorbic Acid (VITAMIN C) 500 MG capsule Take 1 capsule by mouth.     • aspirin 81 MG tablet Take 81 mg by mouth Daily.     • atorvastatin (LIPITOR) 10 MG tablet Take 1 tablet by mouth Daily. 90 tablet 0   • azelaic acid (AZELEX) 15 % gel Apply  topically to the appropriate area as directed.     • benazepril-hydrochlorthiazide (LOTENSIN HCT) 20-12.5 MG per tablet TAKE 1 TABLET DAILY 90 tablet 1   • Calcium 500-125 MG-UNIT tablet Take 1 tablet by mouth.     • Cholecalciferol (VITAMIN D) 1000 UNITS tablet Take 1,000 Units by mouth.     • Denosumab (PROLIA SC) Inject  under the skin Every 6 (Six) Months.     • doxycycline (VIBRAMYCIN) 100 MG capsule Take 100 mg by mouth Daily.     • Multiple Vitamins-Minerals (MULTIVITAMIN ADULT PO) Take  by mouth.     • Multiple Vitamins-Minerals (PRESERVISION AREDS 2 PO) Take  by mouth 2 (Two) Times a Day.     • ROSADAN 0.75 % (CREAM) kit      • SHINGRIX 50 MCG/0.5ML reconstituted suspension      • SYNTHROID 50 MCG tablet TAKE 1  TABLET DAILY 90 tablet 1     No current facility-administered medications on file prior to visit.        ALLERGIES:     Allergies   Allergen Reactions   • Penicillins Hives and Swelling   • Morphine Nausea And Vomiting     SOCIAL HISTORY: She works as a . She does not smoke. She does not drink alcohol. She is  and lives with her .     FAMILY HISTORY: Father  at 57 with a blood clot. Mother  at 72 with a st roke. She has one brother who had melanoma as well as non-Hodgkin lymphoma at age 41; currently he is 65 years old and in good health. She had a sister with breast cancer at age 50. She is followed by our office. She is 57 now and in good health. Hakeem edwards's sister who had breast cancer many years ago now developed metastatic breast cancer, followed by Dr. Gold in our group.         Review of Systems   Constitutional: Negative for appetite change, chills, diaphoresis, fatigue, fever and unexpected weight change.   HENT: Negative for hearing loss, sore throat and trouble swallowing.    Respiratory: Negative for cough, chest tightness, shortness of breath and wheezing.    Cardiovascular: Negative for chest pain, palpitations and leg swelling.   Gastrointestinal: Negative for abdominal distention, abdominal pain, constipation, diarrhea, nausea and vomiting.   Genitourinary: Negative for dysuria, frequency, hematuria and urgency.   Musculoskeletal: Negative for joint swelling.        No muscle weakness.   Skin: Negative for rash and wound.   Neurological: Negative for seizures, syncope, speech difficulty, weakness, numbness and headaches.   Hematological: Negative for adenopathy. Does not bruise/bleed easily.   Psychiatric/Behavioral: Negative for behavioral problems, confusion and suicidal ideas.       Objective      Vitals:    19 1052   BP: 168/84   Pulse: 71   Resp: 16   Temp: 98.3 °F (36.8 °C)   TempSrc: Oral   SpO2: 98%   Weight: 85.1 kg (187 lb 9.6 oz)   Height:  "163.2 cm (64.25\")   PainSc: 0-No pain     Current Status 9/30/2019   ECOG score 0         GENERAL:  Well-developed, well-nourished in no acute distress.   SKIN:  Warm, dry without rashes, purpura or petechiae.  NECK:  Supple with good range of motion; no thyromegaly or masses, no JVD.  LYMPHATICS:  No cervical, supraclavicular, axillary or inguinal adenopathy.  CHEST:  Lungs clear to auscultation. Good airflow.  CARDIAC:  Regular rate and rhythm without murmurs, rubs or gallops. Normal S1,S2.  ABDOMEN:  Soft, nontender with no hepatosplenomegaly or masses.  EXTREMITIES:  No clubbing, cyanosis or edema.  NEUROLOGICAL:  Cranial Nerves II-XII grossly intact.  No focal neurological deficits.  PSYCHIATRIC:  Normal affect and mood.        RECENT LABS:  Hematology WBC   Date Value Ref Range Status   09/30/2019 6.46 3.40 - 10.80 10*3/mm3 Final   01/11/2019 6.82 4.50 - 10.70 10*3/mm3 Final     RBC   Date Value Ref Range Status   09/30/2019 4.64 3.77 - 5.28 10*6/mm3 Final   01/11/2019 4.95 3.90 - 5.20 10*6/mm3 Final     Hemoglobin   Date Value Ref Range Status   09/30/2019 14.0 12.0 - 15.9 g/dL Final     Hematocrit   Date Value Ref Range Status   09/30/2019 42.7 34.0 - 46.6 % Final     Platelets   Date Value Ref Range Status   09/30/2019 260 140 - 450 10*3/mm3 Final        Assessment/Plan     1. This is a patient with history of ductal carcinoma in situ; she completed tamoxifen 5 years. She is here for followup. S he is on a yearly mammogram. She has a family history of breast cancer and she prefers to come here once a year; hence, we will keep her once a year over here.  Patient is 8 years out and followed up yearly.    2.  Family history of breast cancer, he shouldn't sister has metastatic breast cancer with bone metastases and a brother who has history of non-Hodgkin's lymphoma 25 years ago and now with recurrence..    PLAN    1. We reviewed patient's recent mammogram, which did not show any malignancy.    2. We will " continue to monitor patient due to her family history.  3. Return in 1 year with Lucas with labs.      I, Melissa Fernando, acted as scribe for Ines Zavala MD  in documenting the service or procedure. To the best of my knowledge, I recorded what was dictated by MD Ines Bassett MD Kelty, Stephen J, MD

## 2019-10-03 DIAGNOSIS — E03.9 HYPOTHYROIDISM, UNSPECIFIED TYPE: Primary | ICD-10-CM

## 2019-10-04 RX ORDER — LEVOTHYROXINE SODIUM 0.05 MG/1
50 TABLET ORAL DAILY
Qty: 90 TABLET | Refills: 0 | Status: SHIPPED | OUTPATIENT
Start: 2019-10-04 | End: 2020-01-03

## 2019-11-21 DIAGNOSIS — E78.2 MIXED HYPERLIPIDEMIA: ICD-10-CM

## 2019-11-21 RX ORDER — ATORVASTATIN CALCIUM 10 MG/1
TABLET, FILM COATED ORAL
Qty: 90 TABLET | Refills: 0 | Status: SHIPPED | OUTPATIENT
Start: 2019-11-21 | End: 2020-01-28 | Stop reason: SDUPTHER

## 2019-12-11 ENCOUNTER — TELEPHONE (OUTPATIENT)
Dept: FAMILY MEDICINE CLINIC | Facility: CLINIC | Age: 74
End: 2019-12-11

## 2019-12-11 DIAGNOSIS — I10 ESSENTIAL HYPERTENSION: Primary | ICD-10-CM

## 2019-12-11 RX ORDER — BENAZEPRIL HYDROCHLORIDE AND HYDROCHLOROTHIAZIDE 20; 12.5 MG/1; MG/1
1 TABLET ORAL DAILY
Qty: 90 TABLET | Refills: 0 | Status: SHIPPED | OUTPATIENT
Start: 2019-12-11 | End: 2020-01-28 | Stop reason: SDUPTHER

## 2020-01-02 DIAGNOSIS — E03.9 HYPOTHYROIDISM, UNSPECIFIED TYPE: ICD-10-CM

## 2020-01-03 RX ORDER — LEVOTHYROXINE SODIUM 50 MCG
TABLET ORAL
Qty: 90 TABLET | Refills: 0 | Status: SHIPPED | OUTPATIENT
Start: 2020-01-03 | End: 2020-01-28 | Stop reason: SDUPTHER

## 2020-01-14 ENCOUNTER — APPOINTMENT (OUTPATIENT)
Dept: WOMENS IMAGING | Facility: HOSPITAL | Age: 75
End: 2020-01-14

## 2020-01-14 PROCEDURE — 77063 BREAST TOMOSYNTHESIS BI: CPT | Performed by: RADIOLOGY

## 2020-01-14 PROCEDURE — 77067 SCR MAMMO BI INCL CAD: CPT | Performed by: RADIOLOGY

## 2020-01-17 ENCOUNTER — TELEPHONE (OUTPATIENT)
Dept: ONCOLOGY | Facility: CLINIC | Age: 75
End: 2020-01-17

## 2020-01-17 ENCOUNTER — CLINICAL SUPPORT (OUTPATIENT)
Dept: ORTHOPEDIC SURGERY | Facility: CLINIC | Age: 75
End: 2020-01-17

## 2020-01-17 VITALS — TEMPERATURE: 98.1 F | HEIGHT: 64 IN | WEIGHT: 187 LBS | BODY MASS INDEX: 31.92 KG/M2

## 2020-01-17 DIAGNOSIS — M19.019 ARTHRITIS OF SHOULDER: Primary | ICD-10-CM

## 2020-01-17 DIAGNOSIS — Z80.3 FAMILY HISTORY OF BREAST CANCER IN FEMALE: Primary | ICD-10-CM

## 2020-01-17 PROCEDURE — 20610 DRAIN/INJ JOINT/BURSA W/O US: CPT | Performed by: NURSE PRACTITIONER

## 2020-01-17 RX ORDER — METHYLPREDNISOLONE ACETATE 80 MG/ML
80 INJECTION, SUSPENSION INTRA-ARTICULAR; INTRALESIONAL; INTRAMUSCULAR; SOFT TISSUE
Status: COMPLETED | OUTPATIENT
Start: 2020-01-17 | End: 2020-01-17

## 2020-01-17 RX ADMIN — METHYLPREDNISOLONE ACETATE 80 MG: 80 INJECTION, SUSPENSION INTRA-ARTICULAR; INTRALESIONAL; INTRAMUSCULAR; SOFT TISSUE at 08:38

## 2020-01-17 NOTE — PROGRESS NOTES
Ms. Varma comes in today for follow-up.  Injections have worked well in the past.  The patient would like to get a repeat injection today.   She tells me she is not ready for surgery.     The risks, benefits and alternatives were discussed and the patient consented.  Going forward, the patient will follow-up as needed.    Rylee Reyes, APRN    01/17/2020      Large Joint Arthrocentesis: L glenohumeral  Date/Time: 1/17/2020 8:38 AM  Consent given by: patient  Site marked: site marked  Timeout: Immediately prior to procedure a time out was called to verify the correct patient, procedure, equipment, support staff and site/side marked as required   Supporting Documentation  Indications: pain and joint swelling   Procedure Details  Location: shoulder - L glenohumeral  Preparation: Patient was prepped and draped in the usual sterile fashion  Needle gauge: 21g.  Approach: anterior  Medications administered: 2 mL lidocaine (cardiac); 80 mg methylPREDNISolone acetate 80 MG/ML  Patient tolerance: patient tolerated the procedure well with no immediate complications

## 2020-01-17 NOTE — TELEPHONE ENCOUNTER
Pt calling wondering if she should get genetic testing done. She said she saw a  years ago but since then she has had one sister whos breast cancer has metastasized and another sister who developed breast cancer last year. She was told that they can test for more things now and she is wondering if she should get it done again. Informed pt that I would send a message to Dr. Zavala asking her opinion. She v/u. Message sent.

## 2020-01-28 ENCOUNTER — OFFICE VISIT (OUTPATIENT)
Dept: FAMILY MEDICINE CLINIC | Facility: CLINIC | Age: 75
End: 2020-01-28

## 2020-01-28 VITALS
RESPIRATION RATE: 16 BRPM | TEMPERATURE: 98 F | BODY MASS INDEX: 30.22 KG/M2 | DIASTOLIC BLOOD PRESSURE: 81 MMHG | HEART RATE: 74 BPM | SYSTOLIC BLOOD PRESSURE: 142 MMHG | OXYGEN SATURATION: 96 % | HEIGHT: 64 IN | WEIGHT: 177 LBS

## 2020-01-28 DIAGNOSIS — E78.2 MIXED HYPERLIPIDEMIA: ICD-10-CM

## 2020-01-28 DIAGNOSIS — E03.9 HYPOTHYROIDISM, UNSPECIFIED TYPE: ICD-10-CM

## 2020-01-28 DIAGNOSIS — E89.0 POSTOPERATIVE HYPOTHYROIDISM: ICD-10-CM

## 2020-01-28 DIAGNOSIS — I10 ESSENTIAL HYPERTENSION: Primary | ICD-10-CM

## 2020-01-28 PROBLEM — H35.30 MACULAR DEGENERATION OF BOTH EYES: Status: ACTIVE | Noted: 2019-04-19

## 2020-01-28 PROCEDURE — 99214 OFFICE O/P EST MOD 30 MIN: CPT | Performed by: FAMILY MEDICINE

## 2020-01-28 RX ORDER — ATORVASTATIN CALCIUM 10 MG/1
10 TABLET, FILM COATED ORAL DAILY
Qty: 90 TABLET | Refills: 1 | Status: SHIPPED | OUTPATIENT
Start: 2020-01-28 | End: 2020-06-22

## 2020-01-28 RX ORDER — LEVOTHYROXINE SODIUM 0.05 MG/1
50 TABLET ORAL DAILY
Qty: 90 TABLET | Refills: 1 | Status: SHIPPED | OUTPATIENT
Start: 2020-01-28 | End: 2020-08-10 | Stop reason: SDUPTHER

## 2020-01-28 RX ORDER — BENAZEPRIL HYDROCHLORIDE AND HYDROCHLOROTHIAZIDE 20; 12.5 MG/1; MG/1
1 TABLET ORAL DAILY
Qty: 90 TABLET | Refills: 1 | Status: SHIPPED | OUTPATIENT
Start: 2020-01-28 | End: 2020-08-10 | Stop reason: SDUPTHER

## 2020-06-02 ENCOUNTER — CLINICAL SUPPORT (OUTPATIENT)
Dept: OTHER | Facility: HOSPITAL | Age: 75
End: 2020-06-02

## 2020-06-02 DIAGNOSIS — Z80.3 FAMILY HISTORY OF BREAST CANCER: Primary | ICD-10-CM

## 2020-06-02 DIAGNOSIS — C50.911 MALIGNANT NEOPLASM OF RIGHT BREAST IN FEMALE, ESTROGEN RECEPTOR POSITIVE, UNSPECIFIED SITE OF BREAST (HCC): ICD-10-CM

## 2020-06-02 DIAGNOSIS — Z17.0 MALIGNANT NEOPLASM OF RIGHT BREAST IN FEMALE, ESTROGEN RECEPTOR POSITIVE, UNSPECIFIED SITE OF BREAST (HCC): ICD-10-CM

## 2020-06-02 PROCEDURE — G0463 HOSPITAL OUTPT CLINIC VISIT: HCPCS

## 2020-06-02 PROCEDURE — 99203 OFFICE O/P NEW LOW 30 MIN: CPT | Performed by: MEDICAL GENETICS

## 2020-06-02 NOTE — PROGRESS NOTES
The rest of the genetic counseling session focused on the family history of Hereditary Breast and Ovarian Cancer syndrome.  He was told that approximately 70% of breast cancer is sporadic in nature, typically occurring later in life and not associated with a family history of disease.  In about 20 to 25% of cases, familial clustering occurs, in which there is an increased genetic predisposition for breast cancer in women in that family, with a lifetime risk for breast cancer that exceeds the background risk in the general population of 12 to 13%.  In about 5 to 10% of breast cancer cases, a hereditary basis for the malignancy exists, in which there is a significantly increased lifetime risk for breast cancer and unaffected woman, with over a 50% risk for a second primary, and potentially increased risk for other malignancies, and a 50% risk for first-degree relatives being similarly affected unless they have not inherited the pathogenic variant resulting in a risk for breast cancer the same as the risk in the general population.     The most common cause of hereditary breast cancer is Hereditary Breast and Ovarian Cancer syndrome.  Approximately 50% of hereditary breast cancer either results from a pathogenic variant in BRCA1, a tumor suppressor gene on chromosome 17, or BRCA2, a tumor suppressor gene on chromosome 13.  A pathogenic variant in either BRCA1 or BRCA2 in a woman, results in a lifetime risk for breast cancer which can exceed 80%, potentially with over a 50% risk for a second primary in the remaining lifetime of the affected individual.  An increased risk for ovarian cancer also exists which can be as high as 44% with a BRCA1 pathogenic variant and 27% with a BRCA2 pathogenic variant.  An increased risk for male breast cancer, prostate cancer, melanoma, and pancreatic cancer also exists either with a BRCA1 or BRCA2 pathogenic variant.  Characteristics of Hereditary Breast and Ovarian Cancer  syndrome frequently include but are not limited to an early age of diagnosis of breast cancer, multiple breast cancer primaries, both breast and ovarian cancer in the same woman or ovarian cancer alone, male breast cancer, family history of breast and ovarian cancer, ovarian cancer, or multiple cases of breast cancer, triple negative breast cancer and Ashkenazi Anabaptist descent.     Since the original discovery of BRCA1 and BRCA2, several moderate breast cancer risk genes associated with a lifetime risk for breast cancer and affected woman in the range of 20 to 60% have been discovered, including  CHUCKY, CDH 1, CHEK 2, PALB 2, PTEN, STK 11, and TP53.  Although these genes in general have not been associated with an increased risk for ovarian cancer for the most part, they may be associated with an increased risk for other malignancies.     *** wishes to pursue genetic testing.  A blood sample will be sent today to [facility] to perform there 84 gene multi cancer panel.  In addition, additional genes on their breast and gynecologic cancers, nervous system and brain cancers, gastric cancer and prostate cancer panels will be pursued.  Should the out-of-pocket cost to her not exceed $100 [facility] will proceed with genetic testing and results will be communicated to her in 2 to 3 weeks.  Should insurance coverage be denied the maximum out-of-pocket cost to proceed with genetic testing would be $250.  Results are reported either as negative in which no pathogenic variant has been found, positive him which a pathogenic variant has been detected, and/or identification of a variant of uncertain significance, in which a change in the gene has been identified but currently data is insufficient to classified either is benign or pathogenic.  However in most instances eventually when a variant of uncertain significance is reclassified it is considered to be benign although this is not always the case and pathogenicity cannot be ruled out  at this time.  In the interim period of time if Ms. Harvey has any additional questions I can be reached either at [phone] or [phone].

## 2020-06-02 NOTE — PATIENT INSTRUCTIONS
Pt seen by Dr. Capone for genetic counseling and testing. Lab drawn with 21g butterfly needle in Right AC x 1 attempt. Sent to [facility]. Pt informed she would receive a phone call when test results.

## 2020-06-02 NOTE — PROGRESS NOTES
NAME:Sagrario Varma            YOB: 1945    MRN:[id number]    DATE SEEN:06/02/2020    COUNSELOR:    : Joon Capone M.D.      Sagrario Varma was seen for genetic counseling at the [facility].  She referral was initiated by Dr. nIes Zavala MD. Ms. Varma was diagnosed with breast cancer and she had two similarly affected sisters raising the possibility of a hereditary basis for the cancer.    Review of the patient's health history indicated age related macular degeneration of both wet and dry types. She had one polyp on her last colonoscopy. She has her left and one-half of her right ovary. In 2010 she had a lumpectomy for ductal carcinoma in situ followed by radiation and five years of tamoxifen.     Review of the family history and pedigree revealed that Ms. Varma is 74. Two sisters had breast cancer one at 50 and the other at 75. A brother had Hodgkins and Non Hodgkins lymphoma and melanoma.. The rest of the family history was noncontributory and there was no 'ashkenzai Restorationism descent.. .    The rest of the genetic counseling session focused on the possibility of a hereditary basis for her breast cancer diagnosis.  She was told that approximately 65% to 70% of breast cancer is sporadic in nature, usually occurring later in life and without a significant family history of breast cancer.  In about 20% to 25% of cases, familial clustering occurs suggesting an increased genetic predisposition, although a specific abnormality within a gene is not detected.  In about 5% to 10% of cases, a hereditary basis represents the cause for the cancer and a single gene is found to have a pathogenic variant that significantly increases the risk for breast cancer, may increase the risk for a second breast cancer and other malignancies.  In addition, the pathogenic variant places first degree relatives at 50% risk for inheriting the hereditary  condition and, if not inherited, the risk for cancer would be the same as the risk for that cancer in the general population.    The most common cause of hereditary breast cancer is hereditary breast and ovarian cancer syndrome.  About two thirds of hereditary breast cancer either results from mutation in the BRCA1 gene (a tumor suppressor gene on chromosome 17) or the BRCA2 gene(a tumor suppressor gene on chromosome 13).  The presence of a mutation either of these genes increases the lifetime risk for breast cancer from 12% to 13% in the general population potentially to greater than 80%.  It also increases the risk for a second primary breast cancer to greater than 50%  There is also an increased risk for ovarian cancer. In the case of BRCA1, the ovarian cancer risk can be as high as 44% and in the case of BRCA2 as high as 27%, compared to the general population risk of 1.3% to 1.6%.  An increased risk for prostate cancer, male breast cancer, melanoma and pancreatic cancer also exists,either with a BRCA1 or  BRCA2 pathogenic variant.  Characteristics of hereditary breast and ovarian cancer syndrome frequently include but are not limited to an early age of diagnosis of breast cancer, multiple breast cancer primaries, the presence of both breast and ovarian cancer in the same woman or ovarian cancer alone, male breast cancer, family history of breast and ovarian cancer, ovarian cancer or multiple cases of breast cancer,triple negative breast cancer and Ashkenazi Yazidism descent.    Since the original discovery of the BRCA1 and BRCA2 genes in the mid 1990s several moderate risk genes have been identified that may increase the risk fro breast cancer in the range of 20% to as high as 60%.  These moderate breast risk genes in general are not associated with an increased risk for ovarian cancer but may be associated with an increased risk for other malignancies.      A blood sample will be sent today to [facility] to perform  there 84 gene multi cancer panel.  In addition, additional genes on the breast and gynecologic, melanoma and myelodysplastic syndrome/leukemia panels  will be pursued.  Should the out-of-pocket cost to her not exceed $100 [facility] will proceed with genetic testing and results will be communicated to her in 2 to 3 weeks.  Should insurance coverage be denied the maximum out-of-pocket cost to proceed with genetic testing would be $250.  Results are reported either as negative in which no pathogenic variant has been found, positive him which a pathogenic variant has been detected, and/or identification of a variant of uncertain significance, in which a change in the gene has been identified but currently data is insufficient to classified either is benign or pathogenic.  However in most instances eventually when a variant of uncertain significance is reclassified it is considered to be benign although this is not always the case and pathogenicity cannot be ruled out at this time.  In the interim period of time if Sagrario Varma has any additional questions I can be reached either at [phone] or [phone].    Total time of the encounter was30 minutes and 30 minutes was spend counseling.     Joon Capone MD  [unfilled]

## 2020-06-21 DIAGNOSIS — E78.2 MIXED HYPERLIPIDEMIA: ICD-10-CM

## 2020-06-22 RX ORDER — ATORVASTATIN CALCIUM 10 MG/1
TABLET, FILM COATED ORAL
Qty: 90 TABLET | Refills: 1 | Status: SHIPPED | OUTPATIENT
Start: 2020-06-22 | End: 2020-08-10 | Stop reason: SDUPTHER

## 2020-07-06 DIAGNOSIS — I10 ESSENTIAL HYPERTENSION: ICD-10-CM

## 2020-07-06 RX ORDER — BENAZEPRIL HYDROCHLORIDE AND HYDROCHLOROTHIAZIDE 20; 12.5 MG/1; MG/1
TABLET ORAL
Qty: 90 TABLET | Refills: 1 | OUTPATIENT
Start: 2020-07-06

## 2020-07-17 ENCOUNTER — CLINICAL SUPPORT (OUTPATIENT)
Dept: ORTHOPEDIC SURGERY | Facility: CLINIC | Age: 75
End: 2020-07-17

## 2020-07-17 DIAGNOSIS — M19.019 ARTHRITIS OF SHOULDER: Primary | ICD-10-CM

## 2020-07-17 PROCEDURE — 20610 DRAIN/INJ JOINT/BURSA W/O US: CPT | Performed by: NURSE PRACTITIONER

## 2020-07-17 RX ORDER — TRIAMCINOLONE ACETONIDE 40 MG/ML
80 INJECTION, SUSPENSION INTRA-ARTICULAR; INTRAMUSCULAR
Status: COMPLETED | OUTPATIENT
Start: 2020-07-17 | End: 2020-07-17

## 2020-07-17 RX ADMIN — TRIAMCINOLONE ACETONIDE 80 MG: 40 INJECTION, SUSPENSION INTRA-ARTICULAR; INTRAMUSCULAR at 12:55

## 2020-07-17 NOTE — PROGRESS NOTES
Ms. Varma comes in today for follow-up.  Injections have worked well in the past.  The patient would like to get a repeat injection today.  The risks, benefits and alternatives were discussed and the patient consented.  Going forward, the patient will follow-up as needed.    MEGHAN [place]    07/17/2020      Large Joint Arthrocentesis: L glenohumeral  Date/Time: 7/17/2020 12:55 PM  Consent given by: patient  Site marked: site marked  Timeout: Immediately prior to procedure a time out was called to verify the correct patient, procedure, equipment, support staff and site/side marked as required   Supporting Documentation  Indications: pain   Procedure Details  Location: shoulder - L glenohumeral  Preparation: Patient was prepped and draped in the usual sterile fashion  Needle gauge: 21 g.  Approach: anterior  Medications administered: 2 mL lidocaine (cardiac); 80 mg triamcinolone acetonide 40 MG/ML  Patient tolerance: patient tolerated the procedure well with no immediate complications

## 2020-08-10 ENCOUNTER — OFFICE VISIT (OUTPATIENT)
Dept: FAMILY MEDICINE CLINIC | Facility: CLINIC | Age: 75
End: 2020-08-10

## 2020-08-10 VITALS
WEIGHT: 178.8 LBS | RESPIRATION RATE: 18 BRPM | HEIGHT: 64 IN | SYSTOLIC BLOOD PRESSURE: 137 MMHG | TEMPERATURE: 97.1 F | BODY MASS INDEX: 30.52 KG/M2 | DIASTOLIC BLOOD PRESSURE: 84 MMHG | HEART RATE: 76 BPM

## 2020-08-10 DIAGNOSIS — Z00.00 MEDICARE ANNUAL WELLNESS VISIT, SUBSEQUENT: Primary | ICD-10-CM

## 2020-08-10 DIAGNOSIS — I10 ESSENTIAL HYPERTENSION: ICD-10-CM

## 2020-08-10 DIAGNOSIS — E78.2 MIXED HYPERLIPIDEMIA: ICD-10-CM

## 2020-08-10 DIAGNOSIS — R73.02 IMPAIRED GLUCOSE TOLERANCE: ICD-10-CM

## 2020-08-10 DIAGNOSIS — E89.0 POSTOPERATIVE HYPOTHYROIDISM: ICD-10-CM

## 2020-08-10 DIAGNOSIS — E55.9 VITAMIN D DEFICIENCY: ICD-10-CM

## 2020-08-10 PROCEDURE — G0439 PPPS, SUBSEQ VISIT: HCPCS | Performed by: FAMILY MEDICINE

## 2020-08-10 RX ORDER — ATORVASTATIN CALCIUM 10 MG/1
10 TABLET, FILM COATED ORAL DAILY
Qty: 90 TABLET | Refills: 1 | Status: SHIPPED | OUTPATIENT
Start: 2020-08-10 | End: 2021-03-16 | Stop reason: SDUPTHER

## 2020-08-10 RX ORDER — LEVOTHYROXINE SODIUM 0.05 MG/1
50 TABLET ORAL DAILY
Qty: 90 TABLET | Refills: 1 | Status: SHIPPED | OUTPATIENT
Start: 2020-08-10 | End: 2021-03-16 | Stop reason: SDUPTHER

## 2020-08-10 RX ORDER — BENAZEPRIL HYDROCHLORIDE AND HYDROCHLOROTHIAZIDE 20; 12.5 MG/1; MG/1
1 TABLET ORAL DAILY
Qty: 90 TABLET | Refills: 1 | Status: SHIPPED | OUTPATIENT
Start: 2020-08-10 | End: 2021-03-16 | Stop reason: SDUPTHER

## 2020-08-10 NOTE — PROGRESS NOTES
The ABCs of the Annual Wellness Visit  Subsequent Medicare Wellness Visit    Chief Complaint   Patient presents with   • Hyperlipidemia   • Hypertension   • Annual Exam   • Osteoporosis   • Hypothyroidism       Subjective   History of Present Illness:  Sagrario Varma is a 74 y.o. female who presents for a Subsequent Medicare Wellness Visit.    74-year-old female presents today for Medicare wellness visit.    Hypertension HCTZ 20/12 0.5 mg a day.  Blood pressure in the office today 137/84.  Blood pressures at home 120s/70s.  Denies chest pain shortness of.    Hyperlipidemia: Atorvastatin 20 mg a day.    Hypothyroidism: Secondary to thyroidectomy due to multinodular goiter.  Patient is on Synthroid 50 mcg a day.    Also: History of DCIS) for 10 years ago.    Rosacea: Follows with dermatology    Osteoporosis: Endocrinologist prescribes Prolia shots.    Tdap: Up-to-date  Mammogram: Up-to-date  Colonoscopy: Up-to-date  Pneumonia vaccine: Reports UTD [facility]     HEALTH RISK ASSESSMENT    Recent Hospitalizations:  No hospitalization(s) within the last year.    Current Medical Providers:  Patient Care Team:  Anjel Guillen MD as PCP - General (Family Medicine)  Anjel Guillen MD as PCP - Claims Attributed  Ines Zavala MD as Consulting Physician (Hematology and Oncology)  Simba Reid MD as Referring Physician (General Surgery)    Smoking Status:  Social History     Tobacco Use   Smoking Status Never Smoker   Smokeless Tobacco Never Used       Alcohol Consumption:  Social History     Substance and Sexual Activity   Alcohol Use No       Depression Screen:   PHQ-2/PHQ-9 Depression Screening 8/10/2020   Little interest or pleasure in doing things 0   Feeling down, depressed, or hopeless 0   Trouble falling or staying asleep, or sleeping too much 0   Feeling tired or having little energy 0   Poor appetite or overeating 0   Feeling bad about yourself - or that you are a failure or have let yourself or your family  down 0   Trouble concentrating on things, such as reading the newspaper or watching television 0   Moving or speaking so slowly that other people could have noticed. Or the opposite - being so fidgety or restless that you have been moving around a lot more than usual 0   Thoughts that you would be better off dead, or of hurting yourself in some way 0   Total Score 0   If you checked off any problems, how difficult have these problems made it for you to do your work, take care of things at home, or get along with other people? -       Fall Risk Screen:  STEADI Fall Risk Assessment was completed, and patient is at LOW risk for falls.Assessment completed on:8/10/2020    Health Habits and Functional and Cognitive Screening:  Functional & Cognitive Status 8/10/2020   Do you have difficulty preparing food and eating? No   Do you have difficulty bathing yourself, getting dressed or grooming yourself? No   Do you have difficulty using the toilet? No   Do you have difficulty moving around from place to place? No   Do you have trouble with steps or getting out of a bed or a chair? No   Current Diet Well Balanced Diet   Dental Exam Up to date   Eye Exam Up to date   Exercise (times per week) 4 times per week   Current Exercise Activities Include Housecleaning   Do you need help using the phone?  No   Are you deaf or do you have serious difficulty hearing?  No   Do you need help with transportation? No   Do you need help shopping? No   Do you need help preparing meals?  No   Do you need help with housework?  No   Do you need help with laundry? No   Do you need help taking your medications? No   Do you need help managing money? No   Do you ever drive or ride in a car without wearing a seat belt? No   Have you felt unusual stress, anger or loneliness in the last month? Yes   Who do you live with? Spouse   If you need help, do you have trouble finding someone available to you? No   Have you been bothered in the last four weeks by  sexual problems? No   Do you have difficulty concentrating, remembering or making decisions? No         Does the patient have evidence of cognitive impairment? No    Asprin use counseling:Taking ASA appropriately as indicated    Age-appropriate Screening Schedule:  Refer to the list below for future screening recommendations based on patient's age, sex and/or medical conditions. Orders for these recommended tests are listed in the plan section. The patient has been provided with a written plan.    Health Maintenance   Topic Date Due   • LIPID PANEL  06/18/2020   • INFLUENZA VACCINE  08/01/2020   • MAMMOGRAM  01/14/2021   • COLONOSCOPY  01/06/2027   • TDAP/TD VACCINES (3 - Td) 01/04/2028   • ZOSTER VACCINE  Completed          The following portions of the patient's history were reviewed and updated as appropriate: allergies, current medications, past family history, past medical history, past social history, past surgical history and problem list.    Outpatient Medications Prior to Visit   Medication Sig Dispense Refill   • Aflibercept (EYLEA) 2 MG/0.05ML solution      • Ascorbic Acid (VITAMIN C) 500 MG capsule Take 1 capsule by mouth.     • aspirin 81 MG tablet Take 81 mg by mouth Daily.     • azelaic acid (AZELEX) 15 % gel Apply  topically to the appropriate area as directed.     • Calcium 500-125 MG-UNIT tablet Take 1 tablet by mouth.     • Cholecalciferol (VITAMIN D) 1000 UNITS tablet Take 1,000 Units by mouth.     • Denosumab (PROLIA SC) Inject  under the skin Every 6 (Six) Months.     • doxycycline (VIBRAMYCIN) 100 MG capsule Take 100 mg by mouth Daily.     • Multiple Vitamins-Minerals (MULTIVITAMIN ADULT PO) Take  by mouth.     • Multiple Vitamins-Minerals (PRESERVISION AREDS 2 PO) Take  by mouth 2 (Two) Times a Day.     • MULTIPLE VITAMINS-MINERALS PO Take  by mouth Daily.     • ROSADAN 0.75 % (CREAM) kit      • atorvastatin (LIPITOR) 10 MG tablet TAKE 1 TABLET DAILY 90 tablet 1   •  benazepril-hydrochlorthiazide (LOTENSIN HCT) 20-12.5 MG per tablet Take 1 tablet by mouth Daily. 90 tablet 1   • levothyroxine (SYNTHROID) 50 MCG tablet Take 1 tablet by mouth Daily. 90 tablet 1     No facility-administered medications prior to visit.        Patient Active Problem List   Diagnosis   • Generalized osteoarthritis   • Hyperlipidemia   • Hypertension   • Impacted cerumen   • Non-toxic multinodular goiter   • Impaired glucose tolerance   • Vitamin D deficiency   • Encounter for screening colonoscopy   • DCIS (ductal carcinoma in situ)   • Thyroid nodule   • Family history of non-Hodgkin's lymphoma   • Family history of Hodgkin's lymphoma   • Family history of breast cancer in female   • Medicare annual wellness visit, subsequent   • Adhesive capsulitis of left shoulder   • Arm pain, left   • Unknown cause of injury   • Macular degeneration of both eyes   • Postoperative hypothyroidism       Advanced Care Planning:  ACP discussion was held with the patient during this visit. Patient does not have an advance directive, information provided.    Review of Systems   Constitutional: Negative for fever.   HENT: Negative for nosebleeds and trouble swallowing.    Eyes: Negative for visual disturbance.   Respiratory: Negative for choking and stridor.    Cardiovascular: Negative for chest pain.   Gastrointestinal: Negative for blood in stool.   Endocrine: Negative for polydipsia.   Genitourinary: Negative for genital sores and hematuria.   Musculoskeletal: Negative for joint swelling.   Skin: Negative for color change and rash.   Allergic/Immunologic: Negative for immunocompromised state.   Neurological: Negative for seizures, facial asymmetry and speech difficulty.   Hematological: Negative for adenopathy.   Psychiatric/Behavioral: Negative for behavioral problems, self-injury and suicidal ideas.       Compared to one year ago, the patient feels her physical health is the same.  Compared to one year ago, the  "patient feels her mental health is the same.    Reviewed chart for potential of high risk medication in the elderly: yes  Reviewed chart for potential of harmful drug interactions in the elderly:yes    Objective         Vitals:    08/10/20 0817   BP: 137/84   Pulse: 76   Resp: 18   Temp: 97.1 °F (36.2 °C)   Weight: 81.1 kg (178 lb 12.8 oz)   Height: 162.6 cm (64\")   PainSc: 0-No pain       Body mass index is 30.69 kg/m².  Discussed the patient's BMI with her. The BMI is above average; BMI management plan is completed.    Physical Exam   Constitutional: She is oriented to person, place, and time. She appears well-developed and well-nourished.   HENT:   Head: Normocephalic and atraumatic.   Right Ear: External ear normal.   Left Ear: External ear normal.   Nose: Nose normal.   Mouth/Throat: Oropharynx is clear and moist.   Eyes: Pupils are equal, round, and reactive to light. Conjunctivae and EOM are normal.   Neck: Normal range of motion.   Cardiovascular: Normal rate and regular rhythm.   Pulmonary/Chest: Effort normal and breath sounds normal. No stridor. No respiratory distress.   Abdominal: Soft. Bowel sounds are normal. She exhibits no distension. There is no tenderness.   Neurological: She is alert and oriented to person, place, and time.   Skin: Skin is warm.   Psychiatric: She has a normal mood and affect. Her behavior is normal.             Assessment/Plan   Medicare Risks and Personalized Health Plan  CMS Preventative Services Quick Reference  Advance Directive Discussion  Cardiovascular risk  Immunizations Discussed/Encouraged (specific immunizations; Pneumococcal 23 )  Obesity/Overweight   Osteoprorosis Risk  Polypharmacy    The above risks/problems have been discussed with the patient.  Pertinent information has been shared with the patient in the After Visit Summary.  Follow up plans and orders are seen below in the Assessment/Plan Section.    Diagnoses and all orders for this visit:    1. Medicare " annual wellness visit, subsequent (Primary)  -     CBC & Differential  -     Comprehensive Metabolic Panel  -     Lipid Panel  -     TSH Rfx On Abnormal To Free T4  -     Hemoglobin A1c    2. Impaired glucose tolerance  -     Hemoglobin A1c    3. Mixed hyperlipidemia  -     Lipid Panel  -     atorvastatin (LIPITOR) 10 MG tablet; Take 1 tablet by mouth Daily.  Dispense: 90 tablet; Refill: 1    4. Essential hypertension  -     CBC & Differential  -     Comprehensive Metabolic Panel  -     Lipid Panel  -     benazepril-hydrochlorthiazide (LOTENSIN HCT) 20-12.5 MG per tablet; Take 1 tablet by mouth Daily.  Dispense: 90 tablet; Refill: 1    5. Vitamin D deficiency  -     Vitamin D 25 Hydroxy    6. Postoperative hypothyroidism  -     TSH Rfx On Abnormal To Free T4  -     levothyroxine (Synthroid) 50 MCG tablet; Take 1 tablet by mouth Daily.  Dispense: 90 tablet; Refill: 1      Follow Up:  Return in about 6 months (around 2/10/2021) for Recheck.     An After Visit Summary and PPPS were given to the patient.       Low glycemic index diet  Exercise 30 minutes most days of the week  Make sure you get results on any labs or tests we ordered today  We discussed medications and how to take them as prescribed  Sleep 6-8 hours each night if possible  If you have not signed up for [employer], please activate your code ASAP from your After Visit Summary today    LDL goal <100  LDL goal if heart disease <70  HDL goal >60  Triglyceride goal <150  BP goal =<130/80  Fasting glucose <100

## 2020-08-11 LAB
25(OH)D3+25(OH)D2 SERPL-MCNC: 47.4 NG/ML (ref 30–100)
ALBUMIN SERPL-MCNC: 4.8 G/DL (ref 3.5–5.2)
ALBUMIN/GLOB SERPL: 2.4 G/DL
ALP SERPL-CCNC: 93 U/L (ref 39–117)
ALT SERPL-CCNC: 31 U/L (ref 1–33)
AST SERPL-CCNC: 21 U/L (ref 1–32)
BASOPHILS # BLD AUTO: 0.07 10*3/MM3 (ref 0–0.2)
BASOPHILS NFR BLD AUTO: 0.8 % (ref 0–1.5)
BILIRUB SERPL-MCNC: 0.4 MG/DL (ref 0–1.2)
BUN SERPL-MCNC: 10 MG/DL (ref 8–23)
BUN/CREAT SERPL: 13.9 (ref 7–25)
CALCIUM SERPL-MCNC: 9.5 MG/DL (ref 8.6–10.5)
CHLORIDE SERPL-SCNC: 99 MMOL/L (ref 98–107)
CHOLEST SERPL-MCNC: 168 MG/DL (ref 0–200)
CO2 SERPL-SCNC: 29.2 MMOL/L (ref 22–29)
CREAT SERPL-MCNC: 0.72 MG/DL (ref 0.57–1)
EOSINOPHIL # BLD AUTO: 0.16 10*3/MM3 (ref 0–0.4)
EOSINOPHIL NFR BLD AUTO: 1.9 % (ref 0.3–6.2)
ERYTHROCYTE [DISTWIDTH] IN BLOOD BY AUTOMATED COUNT: 13 % (ref 12.3–15.4)
GLOBULIN SER CALC-MCNC: 2 GM/DL
GLUCOSE SERPL-MCNC: 90 MG/DL (ref 65–99)
HBA1C MFR BLD: 5.6 % (ref 4.8–5.6)
HCT VFR BLD AUTO: 44.8 % (ref 34–46.6)
HDLC SERPL-MCNC: 61 MG/DL (ref 40–60)
HGB BLD-MCNC: 14.9 G/DL (ref 12–15.9)
IMM GRANULOCYTES # BLD AUTO: 0.03 10*3/MM3 (ref 0–0.05)
IMM GRANULOCYTES NFR BLD AUTO: 0.4 % (ref 0–0.5)
LDLC SERPL CALC-MCNC: 87 MG/DL (ref 0–100)
LYMPHOCYTES # BLD AUTO: 2.28 10*3/MM3 (ref 0.7–3.1)
LYMPHOCYTES NFR BLD AUTO: 26.6 % (ref 19.6–45.3)
MCH RBC QN AUTO: 30.5 PG (ref 26.6–33)
MCHC RBC AUTO-ENTMCNC: 33.3 G/DL (ref 31.5–35.7)
MCV RBC AUTO: 91.8 FL (ref 79–97)
MONOCYTES # BLD AUTO: 0.75 10*3/MM3 (ref 0.1–0.9)
MONOCYTES NFR BLD AUTO: 8.8 % (ref 5–12)
NEUTROPHILS # BLD AUTO: 5.28 10*3/MM3 (ref 1.7–7)
NEUTROPHILS NFR BLD AUTO: 61.5 % (ref 42.7–76)
NRBC BLD AUTO-RTO: 0 /100 WBC (ref 0–0.2)
PLATELET # BLD AUTO: 335 10*3/MM3 (ref 140–450)
POTASSIUM SERPL-SCNC: 5.1 MMOL/L (ref 3.5–5.2)
PROT SERPL-MCNC: 6.8 G/DL (ref 6–8.5)
RBC # BLD AUTO: 4.88 10*6/MM3 (ref 3.77–5.28)
SODIUM SERPL-SCNC: 140 MMOL/L (ref 136–145)
TRIGL SERPL-MCNC: 100 MG/DL (ref 0–150)
TSH SERPL DL<=0.005 MIU/L-ACNC: 2.5 UIU/ML (ref 0.27–4.2)
VLDLC SERPL CALC-MCNC: 20 MG/DL
WBC # BLD AUTO: 8.57 10*3/MM3 (ref 3.4–10.8)

## 2020-10-06 ENCOUNTER — LAB (OUTPATIENT)
Dept: LAB | Facility: HOSPITAL | Age: 75
End: 2020-10-06

## 2020-10-06 ENCOUNTER — OFFICE VISIT (OUTPATIENT)
Dept: ONCOLOGY | Facility: CLINIC | Age: 75
End: 2020-10-06

## 2020-10-06 VITALS
SYSTOLIC BLOOD PRESSURE: 165 MMHG | OXYGEN SATURATION: 99 % | BODY MASS INDEX: 30.81 KG/M2 | DIASTOLIC BLOOD PRESSURE: 80 MMHG | HEIGHT: 64 IN | TEMPERATURE: 97.3 F | WEIGHT: 180.5 LBS | HEART RATE: 75 BPM | RESPIRATION RATE: 18 BRPM

## 2020-10-06 DIAGNOSIS — N63.32 MASS OF AXILLARY TAIL OF LEFT BREAST: ICD-10-CM

## 2020-10-06 DIAGNOSIS — Z80.3 FAMILY HISTORY OF BREAST CANCER IN FEMALE: ICD-10-CM

## 2020-10-06 DIAGNOSIS — D05.10 DUCTAL CARCINOMA IN SITU (DCIS) OF BREAST, UNSPECIFIED LATERALITY: ICD-10-CM

## 2020-10-06 DIAGNOSIS — N63.0 BREAST MASS: Primary | ICD-10-CM

## 2020-10-06 DIAGNOSIS — D05.10 DUCTAL CARCINOMA IN SITU (DCIS) OF BREAST, UNSPECIFIED LATERALITY: Primary | ICD-10-CM

## 2020-10-06 LAB
ALBUMIN SERPL-MCNC: 4.4 G/DL (ref 3.5–5.2)
ALBUMIN/GLOB SERPL: 1.6 G/DL (ref 1.1–2.4)
ALP SERPL-CCNC: 87 U/L (ref 38–116)
ALT SERPL W P-5'-P-CCNC: 26 U/L (ref 0–33)
ANION GAP SERPL CALCULATED.3IONS-SCNC: 7.5 MMOL/L (ref 5–15)
AST SERPL-CCNC: 22 U/L (ref 0–32)
BASOPHILS # BLD AUTO: 0.08 10*3/MM3 (ref 0–0.2)
BASOPHILS NFR BLD AUTO: 1 % (ref 0–1.5)
BILIRUB SERPL-MCNC: 0.3 MG/DL (ref 0.2–1.2)
BUN SERPL-MCNC: 11 MG/DL (ref 6–20)
BUN/CREAT SERPL: 15.1 (ref 7.3–30)
CALCIUM SPEC-SCNC: 9.7 MG/DL (ref 8.5–10.2)
CHLORIDE SERPL-SCNC: 101 MMOL/L (ref 98–107)
CO2 SERPL-SCNC: 30.5 MMOL/L (ref 22–29)
CREAT SERPL-MCNC: 0.73 MG/DL (ref 0.6–1.1)
DEPRECATED RDW RBC AUTO: 45.3 FL (ref 37–54)
EOSINOPHIL # BLD AUTO: 0.11 10*3/MM3 (ref 0–0.4)
EOSINOPHIL NFR BLD AUTO: 1.3 % (ref 0.3–6.2)
ERYTHROCYTE [DISTWIDTH] IN BLOOD BY AUTOMATED COUNT: 13.3 % (ref 12.3–15.4)
GFR SERPL CREATININE-BSD FRML MDRD: 78 ML/MIN/1.73
GLOBULIN UR ELPH-MCNC: 2.8 GM/DL (ref 1.8–3.5)
GLUCOSE SERPL-MCNC: 99 MG/DL (ref 74–124)
HCT VFR BLD AUTO: 42.5 % (ref 34–46.6)
HGB BLD-MCNC: 14.2 G/DL (ref 12–15.9)
IMM GRANULOCYTES # BLD AUTO: 0.03 10*3/MM3 (ref 0–0.05)
IMM GRANULOCYTES NFR BLD AUTO: 0.4 % (ref 0–0.5)
LYMPHOCYTES # BLD AUTO: 2.54 10*3/MM3 (ref 0.7–3.1)
LYMPHOCYTES NFR BLD AUTO: 30.7 % (ref 19.6–45.3)
MCH RBC QN AUTO: 30.7 PG (ref 26.6–33)
MCHC RBC AUTO-ENTMCNC: 33.4 G/DL (ref 31.5–35.7)
MCV RBC AUTO: 92 FL (ref 79–97)
MONOCYTES # BLD AUTO: 0.8 10*3/MM3 (ref 0.1–0.9)
MONOCYTES NFR BLD AUTO: 9.7 % (ref 5–12)
NEUTROPHILS NFR BLD AUTO: 4.72 10*3/MM3 (ref 1.7–7)
NEUTROPHILS NFR BLD AUTO: 56.9 % (ref 42.7–76)
NRBC BLD AUTO-RTO: 0 /100 WBC (ref 0–0.2)
PLATELET # BLD AUTO: 306 10*3/MM3 (ref 140–450)
PMV BLD AUTO: 9.3 FL (ref 6–12)
POTASSIUM SERPL-SCNC: 5.2 MMOL/L (ref 3.5–4.7)
PROT SERPL-MCNC: 7.2 G/DL (ref 6.3–8)
RBC # BLD AUTO: 4.62 10*6/MM3 (ref 3.77–5.28)
SODIUM SERPL-SCNC: 139 MMOL/L (ref 134–145)
WBC # BLD AUTO: 8.28 10*3/MM3 (ref 3.4–10.8)

## 2020-10-06 PROCEDURE — 99214 OFFICE O/P EST MOD 30 MIN: CPT | Performed by: INTERNAL MEDICINE

## 2020-10-06 PROCEDURE — 85025 COMPLETE CBC W/AUTO DIFF WBC: CPT

## 2020-10-06 PROCEDURE — 36415 COLL VENOUS BLD VENIPUNCTURE: CPT

## 2020-10-06 PROCEDURE — 80053 COMPREHEN METABOLIC PANEL: CPT

## 2020-10-06 NOTE — PROGRESS NOTES
Subjective .     REASONS FOR FOLLOWUP:    1. 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   2. Status post partial thyroidectomy.   3. Status post knee surgery.     HISTORY OF PRESENT ILLNESS:  The patient is a 74 y.o. year old female who is here for follow-up with the above-mentioned history.    History of Present Illness     The patient is a 67-year-old female with the above history, currently here for followup. She has completed 5 years of tamoxifen as of 2015.  She was initially diagnosed in .  She comes here yearly now.  She has a family history of sister having had breast cancer and is followed here by Dr. Moya.  She states that her sister was tested for genetics counseling and was negative.  Currently patient is asymptomatic.      Interval history: Patient is doing well except she feels a nodule on the medial aspect of the left breast.  She does have a strong family history of breast cancer.    She denies any nipple discharge, skin changes or adenopathy in the axilla.        PAST MEDICAL HISTORY:   4. Hypertension.   5. High cholesterol.   6. Multinodular goiter of the thyroid gland. She did undergo ultrasound of the thyroid gland and found multinodular goiter. She is followed by Dr. Restrepo for that. She has had a biopsy in the past which was negative.   7. Dilatation and curettage x2 for excessive bleeding.     OB/GYN HISTORY: She started menstrual period at age 12. She obtained menopause at age 56. She is  4, para 3, one stillborn. She has 3 children, two daughters and one son, 37, 35 and 28 year old with normal deliveries.     ONCOLOGIC HISTORY:      The patient was following up with yearly mammogram. She had her yearly mammogram done  10 by Dr. Napoles her primary care physician which showed calcifications in the right breast with additional evaluation and as a result she repeated the mammogram on  0 4/05/ 2010 which showed calcifications in the right breast which were suspicious in the anterior one-third of the right breast at the 12 o' clock position 2 cm away from the nipple.    Subsequently the patient underwent biopsy on 0 4/16/20 10 done at [facility], #[id number]. Path is consistent with ductal carcinoma in situ low to intermediate grade, solid and cribriform types, at least 5 mm in maximum dimension. There was no invasive carcinoma identified and there was some fibrocystic changes identified. Hormonal assays were performed, ER positive at 99%, VA positive at 99%, HER/2 was 1+ and Ki-67 was 6.      She subsequently was referred to Dr. Reid and MRI of the breast was also obtained. MRI of the breast 0 4/24/20 10 had shown post biopsy cavity within the right breast at the 12 o'clock position with an internal metallic clip but no suspicio us surrounding or residual enhancement seen in the right breast. There was no evidence of any axillary adenopathy. There were no suspicious findings on the left breast.      The patient then was admitted to [facility] at which time she underwent surgery by Dr. Reid 0 5/19/20 10. Pathology #[id number]. She underwent right breast lumpectomy with needle localization. The right breast lumpectomy showed breast tissue with single focus of atypical ductal hyperplasia adjacent to the biopsy site. Res idual carcinoma was not identified. There were some fibrocystic changes including ductal hyperplasia without atypia. The additional superior margin on the right breast showed duct hyperplasia without atypia and the deep margin also showed some duct hype rplasia without atypia.      As a result the patient was then referred to Dr. Sumeet Reddy for evaluation of brachytherapy. The patient then received brachytherapy with 10 total fractions given twice daily over a 5- treatment- day period and was prescribed 350 cGy per treatment fraction. The total dose she received  was 3400 cGy in 10 fractions. Currently she is healed from surgery and is here for further recommendations.      Tamoxifen chemoprophylaxis started on 06/22/2010 to complete a total of 5 years.    Mammogram from 12/10/20 11 showed a new oval mass of 4.5 cm. She had a few round calcifications and postsurgical scar in the anterior one-third region of the right breast at the 12 o'clock positive. This area was thought to be consistent with seroma and pos tsurgical change, however, repeat mammogram was suggested in 6 months.        Dr. Reid aspirated 25 cc of clear fluid on 12/14/10 from the right side and the pathology, accession #[id number], was thought to have adipose stroma tissue, histiocytes and inflammat ory cells. No malignant cells were identified, consistent with fat necrosis. However, repeat mammogram and ultrasound are to be done because of the palpable mass, which is significantly large.      The patient underwent radiation 3400 cGy depth of 1 cm from the MammoSite balloon surface, given 10 fractions of 340 cGy each. Treatments were delivered for 5  treatment days, given twice daily. She completed her brachytherapy on 06/09/20 10.      The patient underwent mammogram on 05/17/2011. She was found to have a seroma in the right breast thought to be probably benign and she was to have bilateral yearly mammograms and ultrasound was suggested in  6 months but she has followup with Dr. Reid. He had done drainage of the right breast. The accession # is [id number] at [facility].   So the right breast fine needle aspiration basically showed some inflammatory cells. No malignant cells identified and hence he thought it was just a seroma and not to worry about.    Mammogram 12/14/2011 shows seroma in the right breast, probably benign, however, followup mammogram suggested in  6 months and followup ultrasound of the right breast is suggested in six months.      Mammogram done in June 2012 showed that she had seroma on  the right breast, which was 21 mm with calcifications and postsurgical scar in the anterior one-third region of the right breast. It was felt that this is consistent with the seroma, as it had decreased in size. A right breast ultrasound was also done and the ultrasound suggested an oval elongated seroma w ith thick margins, about 21 mm. No solid or suspicious lesions were seen, but a 6-month followup was suggested.      Mammogram on 12/13/2012 was negative.    Patient had mammogram on 12/16/2013 and it was negative.    Her mammogram done 12/29/201 4 is negative.    Mammogram January 2018 negative      February 5, 2019: Left breast biopsy at 12:30 position fine-needle aspiration no malignant cells seen.  Cytology features consistent with benign fibrocystic changes.      Current Outpatient Medications on File Prior to Visit   Medication Sig Dispense Refill   • Aflibercept (EYLEA) 2 MG/0.05ML solution      • Ascorbic Acid (VITAMIN C) 500 MG capsule Take 1 capsule by mouth.     • aspirin 81 MG tablet Take 81 mg by mouth Daily.     • atorvastatin (LIPITOR) 10 MG tablet Take 1 tablet by mouth Daily. 90 tablet 1   • azelaic acid (AZELEX) 15 % gel Apply  topically to the appropriate area as directed.     • benazepril-hydrochlorthiazide (LOTENSIN HCT) 20-12.5 MG per tablet Take 1 tablet by mouth Daily. 90 tablet 1   • Calcium 500-125 MG-UNIT tablet Take 1 tablet by mouth.     • Cholecalciferol (VITAMIN D) 1000 UNITS tablet Take 1,000 Units by mouth.     • Denosumab (PROLIA SC) Inject  under the skin Every 6 (Six) Months.     • doxycycline (VIBRAMYCIN) 100 MG capsule Take 100 mg by mouth Daily.     • levothyroxine (Synthroid) 50 MCG tablet Take 1 tablet by mouth Daily. 90 tablet 1   • metroNIDAZOLE (METROCREAM) 0.75 % cream Apply  topically to the appropriate area as directed 2 (Two) Times a Day.     • Multiple Vitamins-Minerals (MULTIVITAMIN ADULT PO) Take  by mouth.     • Multiple Vitamins-Minerals (PRESERVISION AREDS 2  PO) Take  by mouth 2 (Two) Times a Day.     • sulfacetamide sodium-sulfur (AVAR,PLEXION) 10-5 % topical emulsion        No current facility-administered medications on file prior to visit.        ALLERGIES:     Allergies   Allergen Reactions   • Penicillins Hives and Swelling   • Morphine Nausea And Vomiting     SOCIAL HISTORY: She works as a . She does not smoke. She does not drink alcohol. She is  and lives with her .     FAMILY HISTORY: Father  at 57 with a blood clot. Mother  at 72 with a st roke. She has one brother who had melanoma as well as non-Hodgkin lymphoma at age 41; currently he is 65 years old and in good health. She had a sister with breast cancer at age 50. She is followed by our office. She is 57 now and in good health. Hakeem edwards's sister who had breast cancer many years ago now developed metastatic breast cancer, followed by Dr. Gold in our group.         Review of Systems   Constitutional: Negative for appetite change, chills, diaphoresis, fatigue, fever and unexpected weight change.   HENT: Negative for hearing loss, sore throat and trouble swallowing.    Respiratory: Negative for cough, chest tightness, shortness of breath and wheezing.    Cardiovascular: Negative for chest pain, palpitations and leg swelling.   Gastrointestinal: Negative for abdominal distention, abdominal pain, constipation, diarrhea, nausea and vomiting.   Genitourinary: Negative for dysuria, frequency, hematuria and urgency.   Musculoskeletal: Negative for joint swelling.        No muscle weakness.   Skin: Negative for rash and wound.   Neurological: Negative for seizures, syncope, speech difficulty, weakness, numbness and headaches.   Hematological: Negative for adenopathy. Does not bruise/bleed easily.   Psychiatric/Behavioral: Negative for behavioral problems, confusion and suicidal ideas.   All other systems reviewed and are negative.  I have reviewed and confirmed the accuracy  "of the ROS as documented by the MA/LPN/RN Ines Zavala MD        Objective      Vitals:    10/06/20 1538   BP: 165/80   Pulse: 75   Resp: 18   Temp: 97.3 °F (36.3 °C)   TempSrc: Skin   SpO2: 99%   Weight: 81.9 kg (180 lb 8 oz)   Height: 162.6 cm (64.02\")   PainSc: 0-No pain     Current Status 10/6/2020   ECOG score 0         GENERAL:  Well-developed, well-nourished in no acute distress.   SKIN:  Warm, dry without rashes, purpura or petechiae.  NECK:  Supple with good range of motion; no thyromegaly or masses, no JVD.  LYMPHATICS:  No cervical, supraclavicular, axillary or inguinal adenopathy.  CHEST:  Lungs clear to auscultation. Good airflow.  BREAST: Right breast: No skin changes, no evidence of breast mass, no nipple discharge, no evidence of any right axillary adenopathy or right supraclavicular adenopathy  Left breast: Feel a small vague tiny nodule in the medial aspect of the left breast upper inner quadrant.  No evidence of any skin changes, no evidence of any left breast mass and no evidence of left nipple discharge as well as no left axillary adenopathy or left supraclavicular adenopathy.  CARDIAC:  Regular rate and rhythm without murmurs, rubs or gallops. Normal S1,S2.  ABDOMEN:  Soft, nontender with no hepatosplenomegaly or masses.  EXTREMITIES:  No clubbing, cyanosis or edema.  NEUROLOGICAL:  Cranial Nerves II-XII grossly intact.  No focal neurological deficits.  PSYCHIATRIC:  Normal affect and mood.        RECENT LABS:  Hematology WBC   Date Value Ref Range Status   10/06/2020 8.28 3.40 - 10.80 10*3/mm3 Final   08/10/2020 8.57 3.40 - 10.80 10*3/mm3 Final     RBC   Date Value Ref Range Status   10/06/2020 4.62 3.77 - 5.28 10*6/mm3 Final   08/10/2020 4.88 3.77 - 5.28 10*6/mm3 Final     Hemoglobin   Date Value Ref Range Status   10/06/2020 14.2 12.0 - 15.9 g/dL Final     Hematocrit   Date Value Ref Range Status   10/06/2020 42.5 34.0 - 46.6 % Final     Platelets   Date Value Ref Range Status "   10/06/2020 306 140 - 450 10*3/mm3 Final        Assessment/Plan     1. This is a patient with history of ductal carcinoma in situ; she completed tamoxifen 5 years. She is here for followup. S he is on a yearly mammogram. She has a family history of breast cancer and she prefers to come here once a year; hence, we will keep her once a year over here.  Patient is 8 years out and followed up yearly.  · October 6, 2020 complaints of small nodule on the medial aspect of left breast.  · Will obtain left diagnostic mammogram and ultrasound    2.  Family history of breast cancer, her sister has metastatic breast cancer with bone metastases and a brother who has history of non-Hodgkin's lymphoma 25 years ago and then developed Hodgkin's lymphoma.    PLAN    1. Given patient has vague small nodule on the medial aspect of left breast, will obtain left breast diagnostic mammogram and ultrasound  2. Reviewed her screening mammogram from January 2020 which was negative  3. Follow-up with me in 1 month to evaluate left diagnostic mammogram and ultrasound      Ines Zavala MD           No ref. provider found

## 2020-10-15 ENCOUNTER — CLINICAL SUPPORT (OUTPATIENT)
Dept: FAMILY MEDICINE CLINIC | Facility: CLINIC | Age: 75
End: 2020-10-15

## 2020-10-15 DIAGNOSIS — Z23 NEED FOR INFLUENZA VACCINATION: ICD-10-CM

## 2020-10-15 PROCEDURE — 90694 VACC AIIV4 NO PRSRV 0.5ML IM: CPT | Performed by: FAMILY MEDICINE

## 2020-10-15 PROCEDURE — G0008 ADMIN INFLUENZA VIRUS VAC: HCPCS | Performed by: FAMILY MEDICINE

## 2020-11-10 ENCOUNTER — APPOINTMENT (OUTPATIENT)
Dept: WOMENS IMAGING | Facility: HOSPITAL | Age: 75
End: 2020-11-10

## 2020-11-10 PROCEDURE — G0279 TOMOSYNTHESIS, MAMMO: HCPCS | Performed by: RADIOLOGY

## 2020-11-10 PROCEDURE — 77065 DX MAMMO INCL CAD UNI: CPT | Performed by: RADIOLOGY

## 2020-11-10 PROCEDURE — 76641 ULTRASOUND BREAST COMPLETE: CPT | Performed by: RADIOLOGY

## 2020-11-13 ENCOUNTER — OFFICE VISIT (OUTPATIENT)
Dept: ONCOLOGY | Facility: CLINIC | Age: 75
End: 2020-11-13

## 2020-11-13 ENCOUNTER — LAB (OUTPATIENT)
Dept: LAB | Facility: HOSPITAL | Age: 75
End: 2020-11-13

## 2020-11-13 VITALS
RESPIRATION RATE: 19 BRPM | SYSTOLIC BLOOD PRESSURE: 150 MMHG | BODY MASS INDEX: 30.73 KG/M2 | WEIGHT: 180 LBS | HEART RATE: 79 BPM | DIASTOLIC BLOOD PRESSURE: 82 MMHG | HEIGHT: 64 IN | TEMPERATURE: 98 F | OXYGEN SATURATION: 98 %

## 2020-11-13 DIAGNOSIS — Z12.31 SCREENING MAMMOGRAM, ENCOUNTER FOR: ICD-10-CM

## 2020-11-13 DIAGNOSIS — C50.919 MALIGNANT NEOPLASM OF FEMALE BREAST, UNSPECIFIED ESTROGEN RECEPTOR STATUS, UNSPECIFIED LATERALITY, UNSPECIFIED SITE OF BREAST (HCC): ICD-10-CM

## 2020-11-13 DIAGNOSIS — N63.0 BREAST MASS: Primary | ICD-10-CM

## 2020-11-13 DIAGNOSIS — D05.10 DUCTAL CARCINOMA IN SITU (DCIS) OF BREAST, UNSPECIFIED LATERALITY: ICD-10-CM

## 2020-11-13 DIAGNOSIS — Z80.3 FAMILY HISTORY OF BREAST CANCER IN FEMALE: ICD-10-CM

## 2020-11-13 DIAGNOSIS — N63.0 BREAST MASS: ICD-10-CM

## 2020-11-13 LAB
ALBUMIN SERPL-MCNC: 4.2 G/DL (ref 3.5–5.2)
ALBUMIN/GLOB SERPL: 1.6 G/DL (ref 1.1–2.4)
ALP SERPL-CCNC: 96 U/L (ref 38–116)
ALT SERPL W P-5'-P-CCNC: 31 U/L (ref 0–33)
ANION GAP SERPL CALCULATED.3IONS-SCNC: 8.2 MMOL/L (ref 5–15)
AST SERPL-CCNC: 27 U/L (ref 0–32)
BASOPHILS # BLD AUTO: 0.07 10*3/MM3 (ref 0–0.2)
BASOPHILS NFR BLD AUTO: 1.2 % (ref 0–1.5)
BILIRUB SERPL-MCNC: 0.3 MG/DL (ref 0.2–1.2)
BUN SERPL-MCNC: 7 MG/DL (ref 6–20)
BUN/CREAT SERPL: 11.7 (ref 7.3–30)
CALCIUM SPEC-SCNC: 9.4 MG/DL (ref 8.5–10.2)
CHLORIDE SERPL-SCNC: 102 MMOL/L (ref 98–107)
CO2 SERPL-SCNC: 30.8 MMOL/L (ref 22–29)
CREAT SERPL-MCNC: 0.6 MG/DL (ref 0.6–1.1)
DEPRECATED RDW RBC AUTO: 43.2 FL (ref 37–54)
EOSINOPHIL # BLD AUTO: 0.19 10*3/MM3 (ref 0–0.4)
EOSINOPHIL NFR BLD AUTO: 3.3 % (ref 0.3–6.2)
ERYTHROCYTE [DISTWIDTH] IN BLOOD BY AUTOMATED COUNT: 12.7 % (ref 12.3–15.4)
GFR SERPL CREATININE-BSD FRML MDRD: 98 ML/MIN/1.73
GLOBULIN UR ELPH-MCNC: 2.7 GM/DL (ref 1.8–3.5)
GLUCOSE SERPL-MCNC: 98 MG/DL (ref 74–124)
HCT VFR BLD AUTO: 42.2 % (ref 34–46.6)
HGB BLD-MCNC: 13.9 G/DL (ref 12–15.9)
IMM GRANULOCYTES # BLD AUTO: 0.01 10*3/MM3 (ref 0–0.05)
IMM GRANULOCYTES NFR BLD AUTO: 0.2 % (ref 0–0.5)
LYMPHOCYTES # BLD AUTO: 1.63 10*3/MM3 (ref 0.7–3.1)
LYMPHOCYTES NFR BLD AUTO: 28.3 % (ref 19.6–45.3)
MCH RBC QN AUTO: 30.3 PG (ref 26.6–33)
MCHC RBC AUTO-ENTMCNC: 32.9 G/DL (ref 31.5–35.7)
MCV RBC AUTO: 91.9 FL (ref 79–97)
MONOCYTES # BLD AUTO: 0.71 10*3/MM3 (ref 0.1–0.9)
MONOCYTES NFR BLD AUTO: 12.3 % (ref 5–12)
NEUTROPHILS NFR BLD AUTO: 3.15 10*3/MM3 (ref 1.7–7)
NEUTROPHILS NFR BLD AUTO: 54.7 % (ref 42.7–76)
NRBC BLD AUTO-RTO: 0 /100 WBC (ref 0–0.2)
PLATELET # BLD AUTO: 290 10*3/MM3 (ref 140–450)
PMV BLD AUTO: 9.2 FL (ref 6–12)
POTASSIUM SERPL-SCNC: 4.2 MMOL/L (ref 3.5–4.7)
PROT SERPL-MCNC: 6.9 G/DL (ref 6.3–8)
RBC # BLD AUTO: 4.59 10*6/MM3 (ref 3.77–5.28)
SODIUM SERPL-SCNC: 141 MMOL/L (ref 134–145)
WBC # BLD AUTO: 5.76 10*3/MM3 (ref 3.4–10.8)

## 2020-11-13 PROCEDURE — 85025 COMPLETE CBC W/AUTO DIFF WBC: CPT

## 2020-11-13 PROCEDURE — 80053 COMPREHEN METABOLIC PANEL: CPT

## 2020-11-13 PROCEDURE — 36415 COLL VENOUS BLD VENIPUNCTURE: CPT

## 2020-11-13 PROCEDURE — 99214 OFFICE O/P EST MOD 30 MIN: CPT | Performed by: INTERNAL MEDICINE

## 2020-12-28 DIAGNOSIS — I10 ESSENTIAL HYPERTENSION: ICD-10-CM

## 2020-12-28 DIAGNOSIS — E89.0 POSTOPERATIVE HYPOTHYROIDISM: ICD-10-CM

## 2020-12-28 RX ORDER — BENAZEPRIL HYDROCHLORIDE AND HYDROCHLOROTHIAZIDE 20; 12.5 MG/1; MG/1
TABLET ORAL
Qty: 90 TABLET | Refills: 1 | OUTPATIENT
Start: 2020-12-28

## 2020-12-28 RX ORDER — LEVOTHYROXINE SODIUM 50 MCG
TABLET ORAL
Qty: 90 TABLET | Refills: 1 | OUTPATIENT
Start: 2020-12-28

## 2021-01-25 ENCOUNTER — APPOINTMENT (OUTPATIENT)
Dept: WOMENS IMAGING | Facility: HOSPITAL | Age: 76
End: 2021-01-25

## 2021-01-25 PROCEDURE — 77063 BREAST TOMOSYNTHESIS BI: CPT | Performed by: RADIOLOGY

## 2021-01-25 PROCEDURE — 77067 SCR MAMMO BI INCL CAD: CPT | Performed by: RADIOLOGY

## 2021-03-05 ENCOUNTER — OFFICE VISIT (OUTPATIENT)
Dept: ORTHOPEDIC SURGERY | Facility: CLINIC | Age: 76
End: 2021-03-05

## 2021-03-05 VITALS — HEIGHT: 64 IN | WEIGHT: 180 LBS | TEMPERATURE: 97.2 F | BODY MASS INDEX: 30.73 KG/M2

## 2021-03-05 DIAGNOSIS — M19.019 ARTHRITIS OF SHOULDER: Primary | ICD-10-CM

## 2021-03-05 PROCEDURE — 73030 X-RAY EXAM OF SHOULDER: CPT | Performed by: NURSE PRACTITIONER

## 2021-03-05 PROCEDURE — 20610 DRAIN/INJ JOINT/BURSA W/O US: CPT | Performed by: NURSE PRACTITIONER

## 2021-03-05 RX ORDER — MULTIVIT-MIN/IRON/FOLIC ACID/K 18-600-40
500 CAPSULE ORAL DAILY
COMMUNITY
End: 2021-03-16 | Stop reason: SDUPTHER

## 2021-03-05 RX ORDER — METHYLPREDNISOLONE ACETATE 80 MG/ML
80 INJECTION, SUSPENSION INTRA-ARTICULAR; INTRALESIONAL; INTRAMUSCULAR; SOFT TISSUE
Status: COMPLETED | OUTPATIENT
Start: 2021-03-05 | End: 2021-03-05

## 2021-03-05 RX ADMIN — METHYLPREDNISOLONE ACETATE 80 MG: 80 INJECTION, SUSPENSION INTRA-ARTICULAR; INTRALESIONAL; INTRAMUSCULAR; SOFT TISSUE at 13:34

## 2021-03-05 NOTE — PROGRESS NOTES
Ms. Varma comes in today for follow-up.  Injections have worked well in the past.  The patient would like to get a repeat injection today.      Imaging:  AP, scapular Y, and axillary views of the right shoulder are ordered by myself and reviewed to evaluate the patient's complaint.  These are compared to previous x-rays the x-rays show severe glenohumeral osteoarthritis with bone on bone degeneration, osteophyte formation, and subchondral sclerosis.  The acromiohumeral interval measures normal.    We discussed surgery in detail, but she is not quite ready.  She will give it sincere consideration and let me know if she decides to proceed.  The risks, benefits and alternatives were discussed and the patient consented.  Going forward, the patient will follow-up as needed.    MEGHAN [place]    03/05/2021      Large Joint Arthrocentesis: L glenohumeral  Date/Time: 3/5/2021 1:34 PM  Consent given by: patient  Site marked: site marked  Timeout: Immediately prior to procedure a time out was called to verify the correct patient, procedure, equipment, support staff and site/side marked as required   Supporting Documentation  Indications: pain   Procedure Details  Location: shoulder - L glenohumeral  Preparation: Patient was prepped and draped in the usual sterile fashion  Needle gauge: 21 G.  Approach: anterior  Medications administered: 2 mL lidocaine (cardiac); 80 mg methylPREDNISolone acetate 80 MG/ML  Patient tolerance: patient tolerated the procedure well with no immediate complications

## 2021-03-09 DIAGNOSIS — Z23 IMMUNIZATION DUE: ICD-10-CM

## 2021-03-16 ENCOUNTER — OFFICE VISIT (OUTPATIENT)
Dept: FAMILY MEDICINE CLINIC | Facility: CLINIC | Age: 76
End: 2021-03-16

## 2021-03-16 VITALS
SYSTOLIC BLOOD PRESSURE: 155 MMHG | BODY MASS INDEX: 30.9 KG/M2 | HEIGHT: 64 IN | TEMPERATURE: 97.5 F | HEART RATE: 78 BPM | DIASTOLIC BLOOD PRESSURE: 76 MMHG | WEIGHT: 181 LBS

## 2021-03-16 DIAGNOSIS — I10 ESSENTIAL HYPERTENSION: Primary | ICD-10-CM

## 2021-03-16 DIAGNOSIS — E89.0 POSTOPERATIVE HYPOTHYROIDISM: ICD-10-CM

## 2021-03-16 DIAGNOSIS — E78.2 MIXED HYPERLIPIDEMIA: ICD-10-CM

## 2021-03-16 PROCEDURE — 99213 OFFICE O/P EST LOW 20 MIN: CPT | Performed by: FAMILY MEDICINE

## 2021-03-16 RX ORDER — BENAZEPRIL HYDROCHLORIDE AND HYDROCHLOROTHIAZIDE 20; 12.5 MG/1; MG/1
1 TABLET ORAL DAILY
Qty: 90 TABLET | Refills: 1 | Status: SHIPPED | OUTPATIENT
Start: 2021-03-16 | End: 2021-08-27 | Stop reason: SDUPTHER

## 2021-03-16 RX ORDER — ATORVASTATIN CALCIUM 10 MG/1
10 TABLET, FILM COATED ORAL DAILY
Qty: 90 TABLET | Refills: 1 | Status: SHIPPED | OUTPATIENT
Start: 2021-03-16 | End: 2021-08-27 | Stop reason: SDUPTHER

## 2021-03-16 RX ORDER — CETIRIZINE HYDROCHLORIDE 10 MG/1
TABLET ORAL
COMMUNITY
Start: 2019-01-01

## 2021-03-16 RX ORDER — LEVOTHYROXINE SODIUM 0.05 MG/1
50 TABLET ORAL DAILY
Qty: 90 TABLET | Refills: 1 | Status: SHIPPED | OUTPATIENT
Start: 2021-03-16 | End: 2021-08-27 | Stop reason: SDUPTHER

## 2021-03-16 NOTE — PROGRESS NOTES
Subjective   Sagrario Varma is a 75 y.o. female.     History of Present Illness     75-year-old female presents today for 6-month follow-up on hypertension hyperlipidemia hypothyroidism.    Hypertension HCTZ 20/12 0.5 mg a day.  Blood pressure in the office today 155/76. Blood pressures at home 1 teens/70s.  Denies chest pain shortness of.     Hyperlipidemia: Atorvastatin 20 mg a day.     Hypothyroidism: Secondary to thyroidectomy due to multinodular goiter.  Patient is on Synthroid 50 mcg a day.     Also: History of DCIS; for 10 years ago. Follows with Heme/Onc.      Rosacea: Follows with dermatology     Osteoporosis: Endocrinologist prescribes Prolia shots.    The following portions of the patient's history were reviewed and updated as appropriate: allergies, current medications, past family history, past medical history, past social history, past surgical history and problem list.    Review of Systems   Constitutional: Negative for chills and fever.   HENT: Negative for congestion.    Respiratory: Negative for cough and shortness of breath.    Cardiovascular: Negative for chest pain, palpitations and leg swelling.   Gastrointestinal: Negative for abdominal pain, diarrhea and vomiting.       Objective   Physical Exam  Constitutional:       Appearance: She is well-developed.   HENT:      Head: Normocephalic and atraumatic.      Mouth/Throat:      Pharynx: Uvula midline.   Eyes:      Pupils: Pupils are equal, round, and reactive to light.   Cardiovascular:      Rate and Rhythm: Normal rate and regular rhythm.      Heart sounds: No murmur.   Pulmonary:      Effort: Pulmonary effort is normal. No respiratory distress.      Breath sounds: Normal breath sounds. No stridor. No wheezing or rales.   Skin:     General: Skin is warm.   Neurological:      Mental Status: She is alert.   Psychiatric:         Behavior: Behavior normal.                 Assessment/Plan     Diagnoses and all orders for this visit:    1. Essential  hypertension (Primary)  -     benazepril-hydrochlorthiazide (LOTENSIN HCT) 20-12.5 MG per tablet; Take 1 tablet by mouth Daily.  Dispense: 90 tablet; Refill: 1    2. Postoperative hypothyroidism  -     levothyroxine (Synthroid) 50 MCG tablet; Take 1 tablet by mouth Daily.  Dispense: 90 tablet; Refill: 1    3. Mixed hyperlipidemia  -     atorvastatin (LIPITOR) 10 MG tablet; Take 1 tablet by mouth Daily.  Dispense: 90 tablet; Refill: 1

## 2021-03-30 ENCOUNTER — TELEPHONE (OUTPATIENT)
Dept: FAMILY MEDICINE CLINIC | Facility: CLINIC | Age: 76
End: 2021-03-30

## 2021-03-30 NOTE — TELEPHONE ENCOUNTER
PATIENT CALLED REQUESTING TO SPEAK WITH DR. VERA'S MEDICAL ASSISTANT REGARDING SOME INFORMATION THAT NEEDS TO BE PLACED IN HER MEDICAL RECORD.    SHE WOULD LIKE A CALL BACK [phone]

## 2021-07-08 ENCOUNTER — OFFICE VISIT (OUTPATIENT)
Dept: FAMILY MEDICINE CLINIC | Facility: CLINIC | Age: 76
End: 2021-07-08

## 2021-07-08 VITALS
HEART RATE: 84 BPM | OXYGEN SATURATION: 97 % | HEIGHT: 64 IN | BODY MASS INDEX: 31.07 KG/M2 | RESPIRATION RATE: 16 BRPM | WEIGHT: 182 LBS | SYSTOLIC BLOOD PRESSURE: 137 MMHG | DIASTOLIC BLOOD PRESSURE: 79 MMHG | TEMPERATURE: 97.7 F

## 2021-07-08 DIAGNOSIS — J01.00 ACUTE NON-RECURRENT MAXILLARY SINUSITIS: Primary | ICD-10-CM

## 2021-07-08 PROCEDURE — 99213 OFFICE O/P EST LOW 20 MIN: CPT | Performed by: FAMILY MEDICINE

## 2021-07-08 RX ORDER — LEVOFLOXACIN 500 MG/1
500 TABLET, FILM COATED ORAL DAILY
Qty: 7 TABLET | Refills: 0 | Status: SHIPPED | OUTPATIENT
Start: 2021-07-08 | End: 2021-09-09

## 2021-07-08 NOTE — PROGRESS NOTES
Subjective   Sagrario Varma is a 75 y.o. female.     History of Present Illness     Answers for HPI/ROS submitted by the patient on 7/6/2021  What is the primary reason for your visit?: Ear Pain    Sagrario Varma 75 y.o. female who presents for evaluation of ear pain sinus pressure. Symptoms include ear pressure, congestion and productive cough.  Onset of symptoms was several days ago, gradually worsening since that time. Patient denies shortness of breath, wheezing.   Evaluation to date: none Treatment to date:  none.         The following portions of the patient's history were reviewed and updated as appropriate: allergies, current medications, past family history, past medical history, past social history, past surgical history and problem list.    Review of Systems   HENT: Positive for ear pain. Negative for ear discharge, hearing loss, rhinorrhea and sore throat.    Respiratory: Negative for cough.    Gastrointestinal: Negative for abdominal pain, diarrhea and vomiting.   Musculoskeletal: Positive for neck pain.   Skin: Negative for rash.       Objective   Physical Exam  Constitutional:       Appearance: She is well-developed.   HENT:      Head: Normocephalic and atraumatic.      Right Ear: Hearing, ear canal and external ear normal.      Left Ear: Hearing, ear canal and external ear normal.      Nose: Mucosal edema present.      Right Sinus: Maxillary sinus tenderness present. No frontal sinus tenderness.      Left Sinus: Maxillary sinus tenderness present. No frontal sinus tenderness.      Mouth/Throat:      Pharynx: Uvula midline. Posterior oropharyngeal erythema present. No oropharyngeal exudate.      Tonsils: No tonsillar abscesses.   Eyes:      Conjunctiva/sclera: Conjunctivae normal.      Pupils: Pupils are equal, round, and reactive to light.   Cardiovascular:      Rate and Rhythm: Normal rate and regular rhythm.   Pulmonary:      Effort: Pulmonary effort is normal. No respiratory distress.       Breath sounds: Normal breath sounds. No stridor. No decreased breath sounds, wheezing, rhonchi or rales.   Musculoskeletal:         General: Normal range of motion.   Lymphadenopathy:      Cervical:      Right cervical: No superficial cervical adenopathy.     Left cervical: No superficial cervical adenopathy.   Neurological:      Mental Status: She is alert and oriented to person, place, and time.   Psychiatric:         Speech: Speech normal.                 Assessment/Plan     Diagnoses and all orders for this visit:    1. Acute non-recurrent maxillary sinusitis (Primary)  -     levoFLOXacin (Levaquin) 500 MG tablet; Take 1 tablet by mouth Daily.  Dispense: 7 tablet; Refill: 0      For throat pain:  Can gargle with 1/2 Maalox and 1/2 Benadryl liquid ---mix, gargle and spit Take Tylenol for fever or aches  Rest as needed  Call not better in 7 days  Increase fluids  Call if worse  Can use generic Afrin nasal spray up to 5 days for nasal congestion  Finish antibiotic course of therapy

## 2021-07-08 NOTE — PATIENT INSTRUCTIONS
Sinusitis, Adult  Sinusitis is soreness and swelling (inflammation) of your sinuses. Sinuses are hollow spaces in the bones around your face. They are located:  · Around your eyes.  · In the middle of your forehead.  · Behind your nose.  · In your cheekbones.  Your sinuses and nasal passages are lined with a fluid called mucus. Mucus drains out of your sinuses. Swelling can trap mucus in your sinuses. This lets germs (bacteria, virus, or fungus) grow, which leads to infection. Most of the time, this condition is caused by a virus.  What are the causes?  This condition is caused by:  · Allergies.  · Asthma.  · Germs.  · Things that block your nose or sinuses.  · Growths in the nose (nasal polyps).  · Chemicals or irritants in the air.  · Fungus (rare).  What increases the risk?  You are more likely to develop this condition if:  · You have a weak body defense system (immune system).  · You do a lot of swimming or diving.  · You use nasal sprays too much.  · You smoke.  What are the signs or symptoms?  The main symptoms of this condition are pain and a feeling of pressure around the sinuses. Other symptoms include:  · Stuffy nose (congestion).  · Runny nose (drainage).  · Swelling and warmth in the sinuses.  · Headache.  · Toothache.  · A cough that may get worse at night.  · Mucus that collects in the throat or the back of the nose (postnasal drip).  · Being unable to smell and taste.  · Being very tired (fatigue).  · A fever.  · Sore throat.  · Bad breath.  How is this diagnosed?  This condition is diagnosed based on:  · Your symptoms.  · Your medical history.  · A physical exam.  · Tests to find out if your condition is short-term (acute) or long-term (chronic). Your doctor may:  ? Check your nose for growths (polyps).  ? Check your sinuses using a tool that has a light (endoscope).  ? Check for allergies or germs.  ? Do imaging tests, such as an MRI or CT scan.  How is this treated?  Treatment for this condition  depends on the cause and whether it is short-term or long-term.  · If caused by a virus, your symptoms should go away on their own within 10 days. You may be given medicines to relieve symptoms. They include:  ? Medicines that shrink swollen tissue in the nose.  ? Medicines that treat allergies (antihistamines).  ? A spray that treats swelling of the nostrils.   ? Rinses that help get rid of thick mucus in your nose (nasal saline washes).  · If caused by bacteria, your doctor may wait to see if you will get better without treatment. You may be given antibiotic medicine if you have:  ? A very bad infection.  ? A weak body defense system.  · If caused by growths in the nose, you may need to have surgery.  Follow these instructions at home:  Medicines  · Take, use, or apply over-the-counter and prescription medicines only as told by your doctor. These may include nasal sprays.  · If you were prescribed an antibiotic medicine, take it as told by your doctor. Do not stop taking the antibiotic even if you start to feel better.  Hydrate and humidify    · Drink enough water to keep your pee (urine) pale yellow.  · Use a cool mist humidifier to keep the humidity level in your home above 50%.  · Breathe in steam for 10-15 minutes, 3-4 times a day, or as told by your doctor. You can do this in the bathroom while a hot shower is running.  · Try not to spend time in cool or dry air.  Rest  · Rest as much as you can.  · Sleep with your head raised (elevated).  · Make sure you get enough sleep each night.  General instructions    · Put a warm, moist washcloth on your face 3-4 times a day, or as often as told by your doctor. This will help with discomfort.  · Wash your hands often with soap and water. If there is no soap and water, use hand .  · Do not smoke. Avoid being around people who are smoking (secondhand smoke).  · Keep all follow-up visits as told by your doctor. This is important.  Contact a doctor if:  · You  have a fever.  · Your symptoms get worse.  · Your symptoms do not get better within 10 days.  Get help right away if:  · You have a very bad headache.  · You cannot stop throwing up (vomiting).  · You have very bad pain or swelling around your face or eyes.  · You have trouble seeing.  · You feel confused.  · Your neck is stiff.  · You have trouble breathing.  Summary  · Sinusitis is swelling of your sinuses. Sinuses are hollow spaces in the bones around your face.  · This condition is caused by tissues in your nose that become inflamed or swollen. This traps germs. These can lead to infection.  · If you were prescribed an antibiotic medicine, take it as told by your doctor. Do not stop taking it even if you start to feel better.  · Keep all follow-up visits as told by your doctor. This is important.  This information is not intended to replace advice given to you by your health care provider. Make sure you discuss any questions you have with your health care provider.  Document Revised: 05/20/2019 Document Reviewed: 05/20/2019  [employer] Patient Education © 2021 [employer]

## 2021-08-27 ENCOUNTER — TELEPHONE (OUTPATIENT)
Dept: FAMILY MEDICINE CLINIC | Facility: CLINIC | Age: 76
End: 2021-08-27

## 2021-08-27 DIAGNOSIS — I10 ESSENTIAL HYPERTENSION: ICD-10-CM

## 2021-08-27 DIAGNOSIS — E89.0 POSTOPERATIVE HYPOTHYROIDISM: ICD-10-CM

## 2021-08-27 DIAGNOSIS — E78.2 MIXED HYPERLIPIDEMIA: ICD-10-CM

## 2021-08-27 RX ORDER — BENAZEPRIL HYDROCHLORIDE AND HYDROCHLOROTHIAZIDE 20; 12.5 MG/1; MG/1
1 TABLET ORAL DAILY
Qty: 90 TABLET | Refills: 0 | Status: SHIPPED | OUTPATIENT
Start: 2021-08-27 | End: 2021-11-23 | Stop reason: SDUPTHER

## 2021-08-27 RX ORDER — LEVOTHYROXINE SODIUM 0.05 MG/1
50 TABLET ORAL DAILY
Qty: 90 TABLET | Refills: 0 | Status: SHIPPED | OUTPATIENT
Start: 2021-08-27 | End: 2021-11-23 | Stop reason: SDUPTHER

## 2021-08-27 RX ORDER — ATORVASTATIN CALCIUM 10 MG/1
10 TABLET, FILM COATED ORAL DAILY
Qty: 90 TABLET | Refills: 0 | Status: SHIPPED | OUTPATIENT
Start: 2021-08-27 | End: 2021-11-23 | Stop reason: SDUPTHER

## 2021-08-27 NOTE — TELEPHONE ENCOUNTER
Caller: Sagrario Varma    Relationship: Self    Best call back number: [phone]    Medication needed:   Requested Prescriptions     Pending Prescriptions Disp Refills   • benazepril-hydrochlorthiazide (LOTENSIN HCT) 20-12.5 MG per tablet 90 tablet 1     Sig: Take 1 tablet by mouth Daily.   • levothyroxine (Synthroid) 50 MCG tablet 90 tablet 1     Sig: Take 1 tablet by mouth Daily.   • atorvastatin (LIPITOR) 10 MG tablet 90 tablet 1     Sig: Take 1 tablet by mouth Daily.       When do you need the refill by: ASAP    What additional details did the patient provide when requesting the medication:PATIENT SAYS PHARMACY TOLD HER TO CALL ABOUT THESE CAUSE THEY ARENT GETTING ANYTHING ABOUT THESE BACK.        Does the patient have less than a 3 day supply:  [] Yes  [x] No    What is the patient's preferred pharmacy: [facility] - [place], [place] - [address] AT PORTAL TO REGISTERED [facility] [phone]  - [phone] FX

## 2021-09-09 ENCOUNTER — OFFICE VISIT (OUTPATIENT)
Dept: FAMILY MEDICINE CLINIC | Facility: CLINIC | Age: 76
End: 2021-09-09

## 2021-09-09 VITALS
HEART RATE: 98 BPM | WEIGHT: 178 LBS | RESPIRATION RATE: 15 BRPM | DIASTOLIC BLOOD PRESSURE: 76 MMHG | OXYGEN SATURATION: 96 % | SYSTOLIC BLOOD PRESSURE: 130 MMHG | BODY MASS INDEX: 30.53 KG/M2

## 2021-09-09 DIAGNOSIS — Z00.00 ROUTINE HEALTH MAINTENANCE: Primary | ICD-10-CM

## 2021-09-09 DIAGNOSIS — I10 ESSENTIAL HYPERTENSION: ICD-10-CM

## 2021-09-09 DIAGNOSIS — E78.2 MIXED HYPERLIPIDEMIA: ICD-10-CM

## 2021-09-09 PROBLEM — M81.0 AGE RELATED OSTEOPOROSIS: Status: ACTIVE | Noted: 2021-03-23

## 2021-09-09 PROCEDURE — G0439 PPPS, SUBSEQ VISIT: HCPCS | Performed by: FAMILY MEDICINE

## 2021-09-09 NOTE — PROGRESS NOTES
Pt daughter wants you to call because she is not understanding why she needs to the surgeon for an appointment. Needs to clearly understand why she is being referred wants to know what is the concern with nipple and ducts.  Call her after 3pm tomorrow or to [facility] of the Annual Wellness Visit  Subsequent Medicare Wellness Visit    Chief Complaint   Patient presents with   • Annual Exam      Subjective    History of Present Illness:  Sagrario Varma is a 75 y.o. female who presents for a Subsequent Medicare Wellness Visit.    Doing quite well today. No specific questions or concerns. Previously seen by Dr. Guillen. Overdue for MWV. Also due for some routine blood work. Otherwise up to date with preventive screening recommendations.     The following portions of the patient's history were reviewed and   updated as appropriate: allergies, current medications, past family history, past medical history, past social history, past surgical history and problem list.     Compared to one year ago, the patient feels her physical   health is better.    Compared to one year ago, the patient feels her mental   health is the same.    Recent Hospitalizations:  She was not admitted to the hospital during the last year.       Current Medical Providers:  Patient Care Team:  Sumeet Delgado MD as PCP - General (Family Medicine)  Ines Zavala MD as Consulting Physician (Hematology and Oncology)  Simba Reid MD as Referring Physician (General Surgery)    Outpatient Medications Prior to Visit   Medication Sig Dispense Refill   • Aflibercept (EYLEA) 2 MG/0.05ML solution      • Ascorbic Acid (VITAMIN C) 500 MG capsule Take 1 capsule by mouth.     • aspirin 81 MG tablet Take 81 mg by mouth Daily.     • atorvastatin (LIPITOR) 10 MG tablet Take 1 tablet by mouth Daily. 90 tablet 0   • benazepril-hydrochlorthiazide (LOTENSIN HCT) 20-12.5 MG per tablet Take 1 tablet by mouth Daily. 90 tablet 0   • Calcium 500-125 MG-UNIT tablet Take 1 tablet by mouth.     • cetirizine (zyrTEC) 10 MG tablet      • Cholecalciferol (VITAMIN D) 1000 UNITS tablet Take 1,000 Units by mouth.     • Denosumab (PROLIA SC) Inject  under the skin Every 6 (Six) Months.     • doxycycline (VIBRAMYCIN) 100 MG  capsule Take 100 mg by mouth Daily.     • levothyroxine (Synthroid) 50 MCG tablet Take 1 tablet by mouth Daily. 90 tablet 0   • metroNIDAZOLE (METROCREAM) 0.75 % cream Apply  topically to the appropriate area as directed 2 (Two) Times a Day.     • Multiple Vitamins-Minerals (MULTIVITAMIN ADULT PO) Take  by mouth.     • Multiple Vitamins-Minerals (PRESERVISION AREDS 2 PO) Take  by mouth 2 (Two) Times a Day.     • sulfacetamide sodium-sulfur (AVAR,PLEXION) 10-5 % topical emulsion      • azelaic acid (AZELEX) 15 % gel Apply  topically to the appropriate area as directed.     • levoFLOXacin (Levaquin) 500 MG tablet Take 1 tablet by mouth Daily. 7 tablet 0     No facility-administered medications prior to visit.       No opioid medication identified on active medication list. I have reviewed chart for other potential  high risk medication/s and harmful drug interactions in the elderly.          Aspirin is on active medication list. Aspirin use is indicated based on review of current medical condition/s. Pros and cons of this therapy have been discussed today. Benefits of this medication outweigh potential harm.  Patient has been encouraged to continue taking this medication.  .      Patient Active Problem List   Diagnosis   • Generalized osteoarthritis   • Mixed hyperlipidemia   • Essential hypertension   • Non-toxic multinodular goiter   • Impaired glucose tolerance   • Vitamin D deficiency   • DCIS (ductal carcinoma in situ)   • Family history of non-Hodgkin's lymphoma   • Family history of Hodgkin's lymphoma   • Family history of breast cancer in female   • Adhesive capsulitis of left shoulder   • Macular degeneration of both eyes   • Postoperative hypothyroidism   • Age related osteoporosis     Advance Care Planning   Advance Directive is not on file.  ACP discussion was held with the patient during this visit. Patient does not have an advance directive, information provided.    Review of Systems   Constitutional:  Negative for activity change, chills, fatigue and fever.   HENT: Negative for hearing loss, postnasal drip, rhinorrhea, sinus pressure and trouble swallowing.    Eyes: Negative for pain, discharge, redness and visual disturbance.   Respiratory: Negative for cough, chest tightness, shortness of breath and wheezing.    Cardiovascular: Negative for chest pain, palpitations and leg swelling.   Gastrointestinal: Negative for abdominal distention, abdominal pain, constipation, diarrhea, nausea and vomiting.   Genitourinary: Negative for dysuria.   Musculoskeletal: Negative for arthralgias, back pain, joint swelling and myalgias.   Skin: Negative for rash.   Neurological: Negative for dizziness, tremors, weakness and numbness.   Psychiatric/Behavioral: Negative for behavioral problems, dysphoric mood and sleep disturbance. The patient is not nervous/anxious.         Objective       Vitals:    09/09/21 0902   BP: 130/76   Pulse: 98   Resp: 15   SpO2: 96%   Weight: 80.7 kg (178 lb)     BMI Readings from Last 1 Encounters:   09/09/21 30.53 kg/m²   BMI is above normal parameters. Recommendations include: educational material, exercise counseling and nutrition counseling    Does the patient have evidence of cognitive impairment? No    Physical Exam  Vitals and nursing note reviewed.   Constitutional:       General: She is not in acute distress.     Appearance: Normal appearance. She is well-developed. She is not ill-appearing.   HENT:      Right Ear: Tympanic membrane, ear canal and external ear normal.      Left Ear: Tympanic membrane, ear canal and external ear normal.      Nose: Nose normal.      Mouth/Throat:      Mouth: Mucous membranes are moist.      Pharynx: Oropharynx is clear.   Eyes:      Extraocular Movements: Extraocular movements intact.      Conjunctiva/sclera: Conjunctivae normal.      Pupils: Pupils are equal, round, and reactive to light.   Cardiovascular:      Rate and Rhythm: Normal rate and regular rhythm.       Pulses: Normal pulses.      Heart sounds: Normal heart sounds. No murmur heard.     Pulmonary:      Effort: Pulmonary effort is normal. No respiratory distress.      Breath sounds: Normal breath sounds. No wheezing.   Abdominal:      General: Abdomen is flat. Bowel sounds are normal. There is no distension.      Palpations: Abdomen is soft.      Tenderness: There is no abdominal tenderness. There is no guarding or rebound.   Musculoskeletal:         General: No tenderness. Normal range of motion.      Cervical back: Normal range of motion and neck supple.   Skin:     General: Skin is warm and dry.   Neurological:      General: No focal deficit present.      Mental Status: She is alert and oriented to person, place, and time.      Sensory: No sensory deficit.   Psychiatric:         Mood and Affect: Mood normal.         Behavior: Behavior normal.       Lab Results   Component Value Date    GLU 93 09/09/2021    CHLPL 170 09/09/2021    TRIG 131 09/09/2021    HDL 45 09/09/2021     (H) 09/09/2021    VLDL 23 09/09/2021            HEALTH RISK ASSESSMENT    Smoking Status:  Social History     Tobacco Use   Smoking Status Never Smoker   Smokeless Tobacco Never Used     Alcohol Consumption:  Social History     Substance and Sexual Activity   Alcohol Use No     Fall Risk Screen:    [facility] Fall Risk Assessment has not been completed.    Depression Screening:  PHQ-2/PHQ-9 Depression Screening 8/10/2020   Little interest or pleasure in doing things 0   Feeling down, depressed, or hopeless 0   Trouble falling or staying asleep, or sleeping too much 0   Feeling tired or having little energy 0   Poor appetite or overeating 0   Feeling bad about yourself - or that you are a failure or have let yourself or your family down 0   Trouble concentrating on things, such as reading the newspaper or watching television 0   Moving or speaking so slowly that other people could have noticed. Or the opposite - being so fidgety or  restless that you have been moving around a lot more than usual 0   Thoughts that you would be better off dead, or of hurting yourself in some way 0   Total Score 0   If you checked off any problems, how difficult have these problems made it for you to do your work, take care of things at home, or get along with other people? -       Health Habits and Functional and Cognitive Screening:  Functional & Cognitive Status 8/10/2020   Do you have difficulty preparing food and eating? No   Do you have difficulty bathing yourself, getting dressed or grooming yourself? No   Do you have difficulty using the toilet? No   Do you have difficulty moving around from place to place? No   Do you have trouble with steps or getting out of a bed or a chair? No   Current Diet Well Balanced Diet   Dental Exam Up to date   Eye Exam Up to date   Exercise (times per week) 4 times per week   Current Exercise Activities Include Housecleaning   Do you need help using the phone?  No   Are you deaf or do you have serious difficulty hearing?  No   Do you need help with transportation? No   Do you need help shopping? No   Do you need help preparing meals?  No   Do you need help with housework?  No   Do you need help with laundry? No   Do you need help taking your medications? No   Do you need help managing money? No   Do you ever drive or ride in a car without wearing a seat belt? No   Have you felt unusual stress, anger or loneliness in the last month? Yes   Who do you live with? Spouse   If you need help, do you have trouble finding someone available to you? No   Have you been bothered in the last four weeks by sexual problems? No   Do you have difficulty concentrating, remembering or making decisions? No       Age-appropriate Screening Schedule:  Refer to the list below for future screening recommendations based on patient's age, sex and/or medical conditions. Orders for these recommended tests are listed in the plan section. The patient has  been provided with a written plan.    Health Maintenance   Topic Date Due   • DXA SCAN  08/15/2021   • INFLUENZA VACCINE  10/01/2021   • MAMMOGRAM  01/25/2022   • LIPID PANEL  09/09/2022   • TDAP/TD VACCINES (3 - Td or Tdap) 01/04/2028   • ZOSTER VACCINE  Completed              Assessment/Plan     CMS Preventative Services Quick Reference  Risk Factors Identified During Encounter  Inactivity/Sedentary  Obesity/Overweight   The above risks/problems have been discussed with the patient.  Follow up actions/plans if indicated are seen below in the Assessment/Plan Section.  Pertinent information has been shared with the patient in the After Visit Summary.    Diagnoses and all orders for this visit:    1. Routine health maintenance (Primary)    2. Essential hypertension  -     Comprehensive Metabolic Panel    3. Mixed hyperlipidemia  -     Lipid Panel    Orders as above. I'll respond with results as available.     Follow up as below. Encouraged to communicate via [employer] in the meantime.     Follow Up:   Return in about 6 months (around 3/9/2022).     An After Visit Summary and PPPS were given to the patient.           Prevention counseling performed as below: healthy eating and exercise

## 2021-09-09 NOTE — PATIENT INSTRUCTIONS
"Healthy Eating  Following a healthy eating pattern may help you to achieve and maintain a healthy body weight, reduce the risk of chronic disease, and live a long and productive life. It is important to follow a healthy eating pattern at an appropriate calorie level for your body. Your nutritional needs should be met primarily through food by choosing a variety of nutrient-rich foods.  What are tips for following this plan?  Reading food labels  · Read labels and choose the following:  ? Reduced or low sodium.  ? Juices with 100% fruit juice.  ? Foods with low saturated fats and high polyunsaturated and monounsaturated fats.  ? Foods with whole grains, such as whole wheat, cracked wheat, brown rice, and wild rice.  ? Whole grains that are fortified with folic acid. This is recommended for women who are pregnant or who want to become pregnant.  · Read labels and avoid the following:  ? Foods with a lot of added sugars. These include foods that contain brown sugar, corn sweetener, corn syrup, dextrose, fructose, glucose, high-fructose corn syrup, honey, invert sugar, lactose, malt syrup, maltose, molasses, raw sugar, sucrose, trehalose, or turbinado sugar.  § Do not eat more than the following amounts of added sugar per day:  § 6 teaspoons (25 g) for women.  § 9 teaspoons (38 g) for men.  ? Foods that contain processed or refined starches and grains.  ? Refined grain products, such as white flour, degermed cornmeal, white bread, and white rice.  Shopping  · Choose nutrient-rich snacks, such as vegetables, whole fruits, and nuts. Avoid high-calorie and high-sugar snacks, such as potato chips, fruit snacks, and candy.  · Use oil-based dressings and spreads on foods instead of solid fats such as butter, stick margarine, or cream cheese.  · Limit pre-made sauces, mixes, and \"instant\" products such as flavored rice, instant noodles, and ready-made pasta.  · Try more plant-protein sources, such as tofu, tempeh, black beans, " edamame, lentils, nuts, and seeds.  · Explore eating plans such as the Mediterranean diet or vegetarian diet.  Cooking  · Use oil to sauté or stir-nash foods instead of solid fats such as butter, stick margarine, or lard.  · Try baking, boiling, grilling, or broiling instead of frying.  · Remove the fatty part of meats before cooking.  · Steam vegetables in water or broth.  Meal planning    · At meals, imagine dividing your plate into fourths:  ? One-half of your plate is fruits and vegetables.  ? One-fourth of your plate is whole grains.  ? One-fourth of your plate is protein, especially lean meats, poultry, eggs, tofu, beans, or nuts.  · Include low-fat dairy as part of your daily diet.    Lifestyle  · Choose healthy options in all settings, including home, work, school, restaurants, or stores.  · Prepare your food safely:  ? Wash your hands after handling raw meats.  ? Keep food preparation surfaces clean by regularly washing with hot, soapy water.  ? Keep raw meats separate from ready-to-eat foods, such as fruits and vegetables.  ? , meat, poultry, and eggs to the recommended internal temperature.  ? Store foods at safe temperatures. In general:  § Keep cold foods at 40°F (4.4°C) or below.  § Keep hot foods at 140°F (60°C) or above.  § Keep your freezer at 0°F (-17.8°C) or below.  § Foods are no longer safe to eat when they have been between the temperatures of 40°-140°F (4.4-60°C) for more than 2 hours.  What foods should I eat?  Fruits  Aim to eat 2 cup-equivalents of fresh, canned (in natural juice), or frozen fruits each day. Examples of 1 cup-equivalent of fruit include 1 small apple, 8 large strawberries, 1 cup canned fruit, ½ cup dried fruit, or 1 cup 100% juice.  Vegetables  Aim to eat 2½-3 cup-equivalents of fresh and frozen vegetables each day, including different varieties and colors. Examples of 1 cup-equivalent of vegetables include 2 medium carrots, 2 cups raw, leafy greens, 1 cup  chopped vegetable (raw or cooked), or 1 medium baked potato.  Grains  Aim to eat 6 ounce-equivalents of whole grains each day. Examples of 1 ounce-equivalent of grains include 1 slice of bread, 1 cup ready-to-eat cereal, 3 cups popcorn, or ½ cup cooked rice, pasta, or cereal.  Meats and other proteins  Aim to eat 5-6 ounce-equivalents of protein each day. Examples of 1 ounce-equivalent of protein include 1 egg, 1/2 cup nuts or seeds, or 1 tablespoon (16 g) peanut butter. A cut of meat or fish that is the size of a deck of cards is about 3-4 ounce-equivalents.  · Of the protein you eat each week, try to have at least 8 ounces come from seafood. This includes salmon, trout, herring, and anchovies.  Dairy  Aim to eat 3 cup-equivalents of fat-free or low-fat dairy each day. Examples of 1 cup-equivalent of dairy include 1 cup (240 mL) milk, 8 ounces (250 g) yogurt, 1½ ounces (44 g) natural cheese, or 1 cup (240 mL) fortified soy milk.  Fats and oils  · Aim for about 5 teaspoons (21 g) per day. Choose monounsaturated fats, such as canola and olive oils, avocados, peanut butter, and most nuts, or polyunsaturated fats, such as sunflower, corn, and soybean oils, walnuts, pine nuts, sesame seeds, sunflower seeds, and flaxseed.  Beverages  · Aim for six 8-oz glasses of water per day. Limit coffee to three to five 8-oz cups per day.  · Limit caffeinated beverages that have added calories, such as soda and energy drinks.  · Limit alcohol intake to no more than 1 drink a day for nonpregnant women and 2 drinks a day for men. One drink equals 12 oz of beer (355 mL), 5 oz of wine (148 mL), or 1½ oz of hard liquor (44 mL).  Seasoning and other foods  · Avoid adding excess amounts of salt to your foods. Try flavoring foods with herbs and spices instead of salt.  · Avoid adding sugar to foods.  · Try using oil-based dressings, sauces, and spreads instead of solid fats.  This information is based on general [place] nutrition guidelines.  For more information, visit choosemyplate.gov. Exact amounts may vary based on your nutrition needs.  Summary  · A healthy eating plan may help you to maintain a healthy weight, reduce the risk of chronic diseases, and stay active throughout your life.  · Plan your meals. Make sure you eat the right portions of a variety of nutrient-rich foods.  · Try baking, boiling, grilling, or broiling instead of frying.  · Choose healthy options in all settings, including home, work, school, restaurants, or stores.  This information is not intended to replace advice given to you by your health care provider. Make sure you discuss any questions you have with your health care provider.  Document Revised: 04/01/2019 Document Reviewed: 04/01/2019  [employer] Patient Education © 2021 [employer]      Exercising to Stay Healthy  To become healthy and stay healthy, it is recommended that you do moderate-intensity and vigorous-intensity exercise. You can tell that you are exercising at a moderate intensity if your heart starts beating faster and you start breathing faster but can still hold a conversation. You can tell that you are exercising at a vigorous intensity if you are breathing much harder and faster and cannot hold a conversation while exercising.  Exercising regularly is important. It has many health benefits, such as:  · Improving overall fitness, flexibility, and endurance.  · Increasing bone density.  · Helping with weight control.  · Decreasing body fat.  · Increasing muscle strength.  · Reducing stress and tension.  · Improving overall health.  How often should I exercise?  Choose an activity that you enjoy, and set realistic goals. Your health care provider can help you make an activity plan that works for you.  Exercise regularly as told by your health care provider. This may include:  · Doing strength training two times a week, such as:  ? Lifting weights.  ? Using resistance  bands.  ? Push-ups.  ? Sit-ups.  ? Yoga.  · Doing a certain intensity of exercise for a given amount of time. Choose from these options:  ? A total of 150 minutes of moderate-intensity exercise every week.  ? A total of 75 minutes of vigorous-intensity exercise every week.  ? A mix of moderate-intensity and vigorous-intensity exercise every week.  Children, pregnant women, people who have not exercised regularly, people who are overweight, and older adults may need to talk with a health care provider about what activities are safe to do. If you have a medical condition, be sure to talk with your health care provider before you start a new exercise program.  What are some exercise ideas?  Moderate-intensity exercise ideas include:  · Walking 1 mile (1.6 km) in about 15 minutes.  · Biking.  · Hiking.  · Golfing.  · Dancing.  · Water aerobics.  Vigorous-intensity exercise ideas include:  · Walking 4.5 miles (7.2 km) or more in about 1 hour.  · Jogging or running 5 miles (8 km) in about 1 hour.  · Biking 10 miles (16.1 km) or more in about 1 hour.  · Lap swimming.  · Roller-skating or in-line skating.  · Cross-country skiing.  · Vigorous competitive sports, such as football, basketball, and soccer.  · Jumping rope.  · Aerobic dancing.  What are some everyday activities that can help me to get exercise?  · Yard work, such as:  ? Pushing a .  ? Raking and bagging leaves.  · Washing your car.  · Pushing a stroller.  · Shoveling snow.  · Gardening.  · Washing windows or floors.  How can I be more active in my day-to-day activities?  · Use stairs instead of an elevator.  · Take a walk during your lunch break.  · If you drive, park your car farther away from your work or school.  · If you take public transportation, get off one stop early and walk the rest of the way.  · Stand up or walk around during all of your indoor phone calls.  · Get up, stretch, and walk around every 30 minutes throughout the day.  · Enjoy  exercise with a friend. Support to continue exercising will help you keep a regular routine of activity.  What guidelines can I follow while exercising?  · Before you start a new exercise program, talk with your health care provider.  · Do not exercise so much that you hurt yourself, feel dizzy, or get very short of breath.  · Wear comfortable clothes and wear shoes with good support.  · Drink plenty of water while you exercise to prevent dehydration or heat stroke.  · Work out until your breathing and your heartbeat get faster.  Where to find more information  · [place] Department of Health and Human Services: www.hhs.gov  · Centers for Disease Control and Prevention (CDC): www.cdc.gov  Summary  · Exercising regularly is important. It will improve your overall fitness, flexibility, and endurance.  · Regular exercise also will improve your overall health. It can help you control your weight, reduce stress, and improve your bone density.  · Do not exercise so much that you hurt yourself, feel dizzy, or get very short of breath.  · Before you start a new exercise program, talk with your health care provider.  This information is not intended to replace advice given to you by your health care provider. Make sure you discuss any questions you have with your health care provider.  Document Revised: 11/30/2018 Document Reviewed: 11/08/2018  [employer] Patient Education © 2021 [employer]

## 2021-09-09 NOTE — PROGRESS NOTES
Chief Complaint  Establish Care    Subjective    History of Present Illness {CC  Problem List  Visit  Diagnosis   Encounters  Notes  Medications  Labs  Result Review Imaging  Media :23}     Sagrario Varma presents to [facility] for Establish Care.  History of Present Illness         Objective     Vital Signs:   /76   Pulse 98   Resp 15   Wt 80.7 kg (178 lb)   SpO2 96%   BMI 30.53 kg/m²   Physical Exam     Result Review  Data Reviewed:{ Labs  Result Review  Imaging  Med Tab  Media :23}   {The following data was reviewed by (Optional):73213}  {AMBULATORY LABS (Optional):22918}  {Data reviewed (Optional):38133}            Assessment and Plan {CC Problem List  Visit Diagnosis  ROS  Review (Popup)  Health Maintenance  Quality  BestPractice  Medications  SmartSets  SnapShot Encounters  Media :23}   There are no diagnoses linked to this encounter.    {Time Spent (Optional):83533}  Follow Up {Instructions Charge Capture  Follow-up Communications :23}     Patient was given instructions and counseling regarding her condition or for health maintenance advice. Please see specific information pulled into the AVS (placed there by myself) if appropriate.    No follow-ups on file.      DAMON Delgado MD

## 2021-09-10 LAB
ALBUMIN SERPL-MCNC: 4.4 G/DL (ref 3.5–5.2)
ALBUMIN/GLOB SERPL: 1.9 G/DL
ALP SERPL-CCNC: 108 U/L (ref 39–117)
ALT SERPL-CCNC: 22 U/L (ref 1–33)
AST SERPL-CCNC: 23 U/L (ref 1–32)
BILIRUB SERPL-MCNC: 0.3 MG/DL (ref 0–1.2)
BUN SERPL-MCNC: 13 MG/DL (ref 8–23)
BUN/CREAT SERPL: 20.6 (ref 7–25)
CALCIUM SERPL-MCNC: 10.5 MG/DL (ref 8.6–10.5)
CHLORIDE SERPL-SCNC: 102 MMOL/L (ref 98–107)
CHOLEST SERPL-MCNC: 170 MG/DL (ref 0–200)
CO2 SERPL-SCNC: 31.3 MMOL/L (ref 22–29)
CREAT SERPL-MCNC: 0.63 MG/DL (ref 0.57–1)
GLOBULIN SER CALC-MCNC: 2.3 GM/DL
GLUCOSE SERPL-MCNC: 93 MG/DL (ref 65–99)
HDLC SERPL-MCNC: 45 MG/DL (ref 40–60)
LDLC SERPL CALC-MCNC: 102 MG/DL (ref 0–100)
POTASSIUM SERPL-SCNC: 5.3 MMOL/L (ref 3.5–5.2)
PROT SERPL-MCNC: 6.7 G/DL (ref 6–8.5)
SODIUM SERPL-SCNC: 143 MMOL/L (ref 136–145)
TRIGL SERPL-MCNC: 131 MG/DL (ref 0–150)
VLDLC SERPL CALC-MCNC: 23 MG/DL (ref 5–40)

## 2021-09-25 ENCOUNTER — IMMUNIZATION (OUTPATIENT)
Dept: VACCINE CLINIC | Facility: HOSPITAL | Age: 76
End: 2021-09-25

## 2021-09-25 PROCEDURE — 0001A: CPT | Performed by: INTERNAL MEDICINE

## 2021-09-25 PROCEDURE — 91300 HC SARSCOV02 VAC 30MCG/0.3ML IM: CPT | Performed by: INTERNAL MEDICINE

## 2021-09-25 PROCEDURE — 0003A: CPT | Performed by: INTERNAL MEDICINE

## 2021-10-02 ENCOUNTER — FLU SHOT (OUTPATIENT)
Dept: FAMILY MEDICINE CLINIC | Facility: CLINIC | Age: 76
End: 2021-10-02

## 2021-10-02 DIAGNOSIS — Z23 NEED FOR INFLUENZA VACCINATION: Primary | ICD-10-CM

## 2021-10-02 PROCEDURE — 90662 IIV NO PRSV INCREASED AG IM: CPT | Performed by: NURSE PRACTITIONER

## 2021-10-02 PROCEDURE — G0008 ADMIN INFLUENZA VIRUS VAC: HCPCS | Performed by: NURSE PRACTITIONER

## 2021-11-12 ENCOUNTER — OFFICE VISIT (OUTPATIENT)
Dept: ONCOLOGY | Facility: CLINIC | Age: 76
End: 2021-11-12

## 2021-11-12 ENCOUNTER — LAB (OUTPATIENT)
Dept: LAB | Facility: HOSPITAL | Age: 76
End: 2021-11-12

## 2021-11-12 VITALS
OXYGEN SATURATION: 95 % | TEMPERATURE: 97.8 F | DIASTOLIC BLOOD PRESSURE: 80 MMHG | HEART RATE: 71 BPM | RESPIRATION RATE: 16 BRPM | WEIGHT: 178.5 LBS | SYSTOLIC BLOOD PRESSURE: 147 MMHG | BODY MASS INDEX: 30.48 KG/M2 | HEIGHT: 64 IN

## 2021-11-12 DIAGNOSIS — Z80.3 FAMILY HISTORY OF BREAST CANCER IN FEMALE: ICD-10-CM

## 2021-11-12 DIAGNOSIS — N63.0 BREAST MASS: ICD-10-CM

## 2021-11-12 DIAGNOSIS — D64.9 ANEMIA, UNSPECIFIED TYPE: ICD-10-CM

## 2021-11-12 DIAGNOSIS — N63.32 MASS OF AXILLARY TAIL OF LEFT BREAST: ICD-10-CM

## 2021-11-12 DIAGNOSIS — C50.919 MALIGNANT NEOPLASM OF FEMALE BREAST, UNSPECIFIED ESTROGEN RECEPTOR STATUS, UNSPECIFIED LATERALITY, UNSPECIFIED SITE OF BREAST (HCC): ICD-10-CM

## 2021-11-12 DIAGNOSIS — Z12.31 SCREENING MAMMOGRAM, ENCOUNTER FOR: Primary | ICD-10-CM

## 2021-11-12 DIAGNOSIS — Z12.31 SCREENING MAMMOGRAM, ENCOUNTER FOR: ICD-10-CM

## 2021-11-12 LAB
ALBUMIN SERPL-MCNC: 4.1 G/DL (ref 3.5–5.2)
ALBUMIN/GLOB SERPL: 1.4 G/DL (ref 1.1–2.4)
ALP SERPL-CCNC: 113 U/L (ref 38–116)
ALT SERPL W P-5'-P-CCNC: 24 U/L (ref 0–33)
ANION GAP SERPL CALCULATED.3IONS-SCNC: 9.6 MMOL/L (ref 5–15)
AST SERPL-CCNC: 21 U/L (ref 0–32)
BASOPHILS # BLD AUTO: 0.07 10*3/MM3 (ref 0–0.2)
BASOPHILS NFR BLD AUTO: 0.9 % (ref 0–1.5)
BILIRUB SERPL-MCNC: 0.4 MG/DL (ref 0.2–1.2)
BUN SERPL-MCNC: 12 MG/DL (ref 6–20)
BUN/CREAT SERPL: 20.3 (ref 7.3–30)
CALCIUM SPEC-SCNC: 10.4 MG/DL (ref 8.5–10.2)
CHLORIDE SERPL-SCNC: 101 MMOL/L (ref 98–107)
CO2 SERPL-SCNC: 28.4 MMOL/L (ref 22–29)
CREAT SERPL-MCNC: 0.59 MG/DL (ref 0.6–1.1)
DEPRECATED RDW RBC AUTO: 43.5 FL (ref 37–54)
EOSINOPHIL # BLD AUTO: 0.23 10*3/MM3 (ref 0–0.4)
EOSINOPHIL NFR BLD AUTO: 2.9 % (ref 0.3–6.2)
ERYTHROCYTE [DISTWIDTH] IN BLOOD BY AUTOMATED COUNT: 13.2 % (ref 12.3–15.4)
GFR SERPL CREATININE-BSD FRML MDRD: 99 ML/MIN/1.73
GLOBULIN UR ELPH-MCNC: 2.9 GM/DL (ref 1.8–3.5)
GLUCOSE SERPL-MCNC: 88 MG/DL (ref 74–124)
HCT VFR BLD AUTO: 41.8 % (ref 34–46.6)
HGB BLD-MCNC: 13.5 G/DL (ref 12–15.9)
IMM GRANULOCYTES # BLD AUTO: 0.02 10*3/MM3 (ref 0–0.05)
IMM GRANULOCYTES NFR BLD AUTO: 0.3 % (ref 0–0.5)
LYMPHOCYTES # BLD AUTO: 2.08 10*3/MM3 (ref 0.7–3.1)
LYMPHOCYTES NFR BLD AUTO: 26.2 % (ref 19.6–45.3)
MCH RBC QN AUTO: 29.1 PG (ref 26.6–33)
MCHC RBC AUTO-ENTMCNC: 32.3 G/DL (ref 31.5–35.7)
MCV RBC AUTO: 90.1 FL (ref 79–97)
MONOCYTES # BLD AUTO: 0.76 10*3/MM3 (ref 0.1–0.9)
MONOCYTES NFR BLD AUTO: 9.6 % (ref 5–12)
NEUTROPHILS NFR BLD AUTO: 4.77 10*3/MM3 (ref 1.7–7)
NEUTROPHILS NFR BLD AUTO: 60.1 % (ref 42.7–76)
NRBC BLD AUTO-RTO: 0 /100 WBC (ref 0–0.2)
PLATELET # BLD AUTO: 331 10*3/MM3 (ref 140–450)
PMV BLD AUTO: 9.5 FL (ref 6–12)
POTASSIUM SERPL-SCNC: 4.1 MMOL/L (ref 3.5–4.7)
PROT SERPL-MCNC: 7 G/DL (ref 6.3–8)
RBC # BLD AUTO: 4.64 10*6/MM3 (ref 3.77–5.28)
SODIUM SERPL-SCNC: 139 MMOL/L (ref 134–145)
WBC # BLD AUTO: 7.93 10*3/MM3 (ref 3.4–10.8)

## 2021-11-12 PROCEDURE — 85025 COMPLETE CBC W/AUTO DIFF WBC: CPT

## 2021-11-12 PROCEDURE — 99213 OFFICE O/P EST LOW 20 MIN: CPT | Performed by: INTERNAL MEDICINE

## 2021-11-12 PROCEDURE — 80053 COMPREHEN METABOLIC PANEL: CPT

## 2021-11-12 PROCEDURE — 36415 COLL VENOUS BLD VENIPUNCTURE: CPT

## 2021-11-12 NOTE — PROGRESS NOTES
Subjective .     REASONS FOR FOLLOWUP:    1. 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   2. Status post partial thyroidectomy.   3. Status post knee surgery.     HISTORY OF PRESENT ILLNESS:  The patient is a 76 y.o. year old female who is here for follow-up with the above-mentioned history.    History of Present Illness     The patient is a 67-year-old female with the above history, currently here for followup. She has completed 5 years of tamoxifen as of 2015.  She was initially diagnosed in .  She comes here yearly now.  She has a family history of sister having had breast cancer and is followed here by Dr. Moya.  She states that her sister was tested for genetics counseling and was negative.  Currently patient is asymptomatic.      Even though patient is 10 years out she prefers to follow-up here as she has got a strong family history of malignancy.  She is currently followed with observation.  Her mammogram was 2021 which is negative.        PAST MEDICAL HISTORY:   4. Hypertension.   5. High cholesterol.   6. Multinodular goiter of the thyroid gland. She did undergo ultrasound of the thyroid gland and found multinodular goiter. She is followed by Dr. Restrepo for that. She has had a biopsy in the past which was negative.   7. Dilatation and curettage x2 for excessive bleeding.     OB/GYN HISTORY: She started menstrual period at age 12. She obtained menopause at age 56. She is  4, para 3, one stillborn. She has 3 children, two daughters and one son, 37, 35 and 28 year old with normal deliveries.     ONCOLOGIC HISTORY:      The patient was following up with yearly mammogram. She had her yearly mammogram done  10 by Dr. Napoles her primary care physician which showed calcifications in the right breast with additional evaluation and as a result she repeated the mammogram on 0 2010 which  showed calcifications in the right breast which were suspicious in the anterior one-third of the right breast at the 12 o' clock position 2 cm away from the nipple.    Subsequently the patient underwent biopsy on 0 4/16/20 10 done at [facility], #[id number]. Path is consistent with ductal carcinoma in situ low to intermediate grade, solid and cribriform types, at least 5 mm in maximum dimension. There was no invasive carcinoma identified and there was some fibrocystic changes identified. Hormonal assays were performed, ER positive at 99%, MO positive at 99%, HER/2 was 1+ and Ki-67 was 6.      She subsequently was referred to Dr. Reid and MRI of the breast was also obtained. MRI of the breast 0 4/24/20 10 had shown post biopsy cavity within the right breast at the 12 o'clock position with an internal metallic clip but no suspicio us surrounding or residual enhancement seen in the right breast. There was no evidence of any axillary adenopathy. There were no suspicious findings on the left breast.      The patient then was admitted to [facility] at which time she underwent surgery by Dr. Reid 0 5/19/20 10. Pathology #[id number]. She underwent right breast lumpectomy with needle localization. The right breast lumpectomy showed breast tissue with single focus of atypical ductal hyperplasia adjacent to the biopsy site. Res idual carcinoma was not identified. There were some fibrocystic changes including ductal hyperplasia without atypia. The additional superior margin on the right breast showed duct hyperplasia without atypia and the deep margin also showed some duct hype rplasia without atypia.      As a result the patient was then referred to Dr. Sumeet Reddy for evaluation of brachytherapy. The patient then received brachytherapy with 10 total fractions given twice daily over a 5- treatment- day period and was prescribed 350 cGy per treatment fraction. The total dose she received was 3400 cGy in 10  fractions. Currently she is healed from surgery and is here for further recommendations.      Tamoxifen chemoprophylaxis started on 06/22/2010 to complete a total of 5 years.    Mammogram from 12/10/20 11 showed a new oval mass of 4.5 cm. She had a few round calcifications and postsurgical scar in the anterior one-third region of the right breast at the 12 o'clock positive. This area was thought to be consistent with seroma and pos tsurgical change, however, repeat mammogram was suggested in 6 months.        Dr. Reid aspirated 25 cc of clear fluid on 12/14/10 from the right side and the pathology, accession #[id number], was thought to have adipose stroma tissue, histiocytes and inflammat ory cells. No malignant cells were identified, consistent with fat necrosis. However, repeat mammogram and ultrasound are to be done because of the palpable mass, which is significantly large.      The patient underwent radiation 3400 cGy depth of 1 cm from the MammoSite balloon surface, given 10 fractions of 340 cGy each. Treatments were delivered for 5  treatment days, given twice daily. She completed her brachytherapy on 06/09/20 10.      The patient underwent mammogram on 05/17/2011. She was found to have a seroma in the right breast thought to be probably benign and she was to have bilateral yearly mammograms and ultrasound was suggested in  6 months but she has followup with Dr. Reid. He had done drainage of the right breast. The accession # is [id number] at [facility].   So the right breast fine needle aspiration basically showed some inflammatory cells. No malignant cells identified and hence he thought it was just a seroma and not to worry about.    Mammogram 12/14/2011 shows seroma in the right breast, probably benign, however, followup mammogram suggested in  6 months and followup ultrasound of the right breast is suggested in six months.      Mammogram done in June 2012 showed that she had seroma on the right breast,  which was 21 mm with calcifications and postsurgical scar in the anterior one-third region of the right breast. It was felt that this is consistent with the seroma, as it had decreased in size. A right breast ultrasound was also done and the ultrasound suggested an oval elongated seroma w ith thick margins, about 21 mm. No solid or suspicious lesions were seen, but a 6-month followup was suggested.      Mammogram on 12/13/2012 was negative.    Patient had mammogram on 12/16/2013 and it was negative.    Her mammogram done 12/29/201 4 is negative.    Mammogram January 2018 negative      February 5, 2019: Left breast biopsy at 12:30 position fine-needle aspiration no malignant cells seen.  Cytology features consistent with benign fibrocystic changes.      Current Outpatient Medications on File Prior to Visit   Medication Sig Dispense Refill   • Aflibercept (EYLEA) 2 MG/0.05ML solution      • Ascorbic Acid (VITAMIN C) 500 MG capsule Take 1 capsule by mouth.     • aspirin 81 MG tablet Take 81 mg by mouth Daily.     • atorvastatin (LIPITOR) 10 MG tablet Take 1 tablet by mouth Daily. 90 tablet 0   • benazepril-hydrochlorthiazide (LOTENSIN HCT) 20-12.5 MG per tablet Take 1 tablet by mouth Daily. 90 tablet 0   • Calcium 500-125 MG-UNIT tablet Take 1 tablet by mouth.     • cetirizine (zyrTEC) 10 MG tablet      • Cholecalciferol (VITAMIN D) 1000 UNITS tablet Take 1,000 Units by mouth.     • Denosumab (PROLIA SC) Inject  under the skin Every 6 (Six) Months.     • doxycycline (VIBRAMYCIN) 100 MG capsule Take 100 mg by mouth Daily.     • levothyroxine (Synthroid) 50 MCG tablet Take 1 tablet by mouth Daily. 90 tablet 0   • metroNIDAZOLE (METROCREAM) 0.75 % cream Apply  topically to the appropriate area as directed 2 (Two) Times a Day.     • Multiple Vitamins-Minerals (MULTIVITAMIN ADULT PO) Take  by mouth.     • Multiple Vitamins-Minerals (PRESERVISION AREDS 2 PO) Take  by mouth 2 (Two) Times a Day.     • sulfacetamide  sodium-sulfur (AVAR,PLEXION) 10-5 % topical emulsion        No current facility-administered medications on file prior to visit.       ALLERGIES:     Allergies   Allergen Reactions   • Penicillins Hives and Swelling   • Morphine Nausea And Vomiting     SOCIAL HISTORY: She works as a . She does not smoke. She does not drink alcohol. She is  and lives with her .     FAMILY HISTORY: Father  at 57 with a blood clot. Mother  at 72 with a st roke. She has one brother who had melanoma as well as non-Hodgkin lymphoma at age 41; currently he is 65 years old and in good health. She had a sister with breast cancer at age 50. She is followed by our office. She is 57 now and in good health. Hakeem edwards's sister who had breast cancer many years ago now developed metastatic breast cancer, followed by Dr. Gold in our group.         Review of Systems   Constitutional: Negative for appetite change, chills, diaphoresis, fatigue, fever and unexpected weight change.   HENT: Negative for hearing loss, sore throat and trouble swallowing.    Respiratory: Negative for cough, chest tightness, shortness of breath and wheezing.    Cardiovascular: Negative for chest pain, palpitations and leg swelling.   Gastrointestinal: Negative for abdominal distention, abdominal pain, constipation, diarrhea, nausea and vomiting.   Genitourinary: Negative for dysuria, frequency, hematuria and urgency.   Musculoskeletal: Negative for joint swelling.        No muscle weakness.   Skin: Negative for rash and wound.   Neurological: Negative for seizures, syncope, speech difficulty, weakness, numbness and headaches.   Hematological: Negative for adenopathy. Does not bruise/bleed easily.   Psychiatric/Behavioral: Negative for behavioral problems, confusion and suicidal ideas.   All other systems reviewed and are negative.    I have reviewed and confirmed the accuracy of the ROS as documented by the MA/LPN/RN Ines Zavala,  "MD        Objective      Vitals:    11/12/21 1029   BP: 147/80   Pulse: 71   Resp: 16   Temp: 97.8 °F (36.6 °C)   TempSrc: Temporal   SpO2: 95%   Weight: 81 kg (178 lb 8 oz)   Height: 162.6 cm (64.02\")   PainSc: 0-No pain     Current Status 11/12/2021   ECOG score 0       Physical examination    This patient's ACP documentation is up to date, and there's nothing further left to document.      CONSTITUTIONAL:  Vital signs reviewed.  No distress, looks comfortable.  EYES:  Conjunctivae and lids unremarkable.  PERRLA  EARS,NOSE,MOUTH,THROAT:  Ears and nose appear unremarkable.  Lips, teeth, gums appear unremarkable.  RESPIRATORY:  Normal respiratory effort.  Lungs clear to auscultation bilaterally.  BREAST: Right breast: No skin changes, no evidence of breast mass, no nipple discharge, no evidence of any right axillary adenopathy or right supraclavicular adenopathy  Left breast: No evidence of any skin changes, no evidence of any left breast mass and no evidence of left nipple discharge as well as no left axillary adenopathy or left supraclavicular adenopathy.  CARDIOVASCULAR:  Normal S1, S2.  No murmurs rubs or gallops.  No significant lower extremity edema.  GASTROINTESTINAL: Abdomen appears unremarkable.  Nontender.  No hepatomegaly.  No splenomegaly.  LYMPHATIC:  No cervical, supraclavicular, axillary lymphadenopathy.  SKIN:  Warm.  No rashes.  PSYCHIATRIC:  Normal judgment and insight.  Normal mood and affect.    RECENT LABS:  Hematology WBC   Date Value Ref Range Status   11/12/2021 7.93 3.40 - 10.80 10*3/mm3 Final   08/10/2020 8.57 3.40 - 10.80 10*3/mm3 Final     RBC   Date Value Ref Range Status   11/12/2021 4.64 3.77 - 5.28 10*6/mm3 Final   08/10/2020 4.88 3.77 - 5.28 10*6/mm3 Final     Hemoglobin   Date Value Ref Range Status   11/12/2021 13.5 12.0 - 15.9 g/dL Final     Hematocrit   Date Value Ref Range Status   11/12/2021 41.8 34.0 - 46.6 % Final     Platelets   Date Value Ref Range Status   11/12/2021 331 " 140 - 450 10*3/mm3 Final        Assessment/Plan     1. This is a patient with history of ductal carcinoma in situ; she completed tamoxifen 5 years. She is here for followup. S he is on a yearly mammogram. She has a family history of breast cancer and she prefers to come here once a year; hence, we will keep her once a year over here.  Patient is 8 years out and followed up yearly.  · October 6, 2020 complaints of small nodule on the medial aspect of left breast.  · Will obtain left diagnostic mammogram and ultrasound  · Reviewed left diagnostic mammogram, 17 x 7 x 16 mm lesion likely lipoma.  · Patient had undergone left diagnostic mammogram November 10, 2020 which basically showed that the palpable area of concern in the medial left breast posteriorly there was no mammographic finding.  Left breast ultrasound showed a oval hyperechoic mass with thin margins 17 x 7 x 19 mm at 9 o'clock position in the left breast and consistent with a lipoma.  Patient was thought to have a benign mammogram  · Seen by Dr. Reid November 18 2020 and he did not feel that there was any treatment needed for the lipoma.  · Reviewed mammogram from January 2021 and it is normal  · Patient prefers to continue follow-up once a year as she has strong family history of malignancy    2.  Family history of breast cancer, her sister has metastatic breast cancer with bone metastases and a brother who has history of non-Hodgkin's lymphoma 25 years ago and then developed Hodgkin's lymphoma.  · Breast ca at 50 with mets   · Another sister with breast ca at 74    PLAN    · Reviewed the mammogram from January 2021 which is negative  · Reviewed appointment with Dr. Reid from November 2020 and he was not concerned about the lipoma in the left breast  · Follow-up with me in 1 year with trace Zavala MD

## 2021-11-13 PROBLEM — Z12.31 SCREENING MAMMOGRAM, ENCOUNTER FOR: Status: ACTIVE | Noted: 2021-11-13

## 2021-11-13 PROBLEM — N63.32 MASS OF AXILLARY TAIL OF LEFT BREAST: Status: ACTIVE | Noted: 2021-11-13

## 2021-11-18 ENCOUNTER — TELEPHONE (OUTPATIENT)
Dept: ONCOLOGY | Facility: CLINIC | Age: 76
End: 2021-11-18

## 2021-11-18 NOTE — TELEPHONE ENCOUNTER
"Call to Ms. Varma in response to message left to talk to her about the entry in her chart about depression.  Patient states that she disagrees with a message in her My Chart stating \"Follow up recommendations include: PCP managing depression.\"  Patient scored 0 on PHQ-9 score of 9 on 11/12/21, and she does not agree that a follow up is needed.    "

## 2021-11-18 NOTE — TELEPHONE ENCOUNTER
Caller: Sagrario Varma    Relationship: Self    Best call back number: [phone]    What is the best time to reach you: ASAP    Who are you requesting to speak with (clinical staff, provider,  specific staff member): NURSE    Do you know the name of the person who called:     What was the call regarding: PT HAS QUESTIONS ABOUT INFO ON MYCHART    Do you require a callback: YES

## 2021-11-23 DIAGNOSIS — E89.0 POSTOPERATIVE HYPOTHYROIDISM: ICD-10-CM

## 2021-11-23 DIAGNOSIS — E78.2 MIXED HYPERLIPIDEMIA: ICD-10-CM

## 2021-11-23 DIAGNOSIS — I10 ESSENTIAL HYPERTENSION: ICD-10-CM

## 2021-11-23 RX ORDER — ATORVASTATIN CALCIUM 10 MG/1
10 TABLET, FILM COATED ORAL DAILY
Qty: 90 TABLET | Refills: 0 | Status: SHIPPED | OUTPATIENT
Start: 2021-11-23 | End: 2022-02-08

## 2021-11-23 RX ORDER — BENAZEPRIL HYDROCHLORIDE AND HYDROCHLOROTHIAZIDE 20; 12.5 MG/1; MG/1
1 TABLET ORAL DAILY
Qty: 90 TABLET | Refills: 0 | Status: SHIPPED | OUTPATIENT
Start: 2021-11-23 | End: 2022-02-08

## 2021-11-23 RX ORDER — LEVOTHYROXINE SODIUM 0.05 MG/1
50 TABLET ORAL DAILY
Qty: 90 TABLET | Refills: 0 | Status: SHIPPED | OUTPATIENT
Start: 2021-11-23 | End: 2022-02-08

## 2021-11-23 NOTE — TELEPHONE ENCOUNTER
Caller: Sagrario Varma    Relationship: Self    Best call back number:     Requested Prescriptions:   Requested Prescriptions     Pending Prescriptions Disp Refills   • benazepril-hydrochlorthiazide (LOTENSIN HCT) 20-12.5 MG per tablet 90 tablet 0     Sig: Take 1 tablet by mouth Daily.   • levothyroxine (Synthroid) 50 MCG tablet 90 tablet 0     Sig: Take 1 tablet by mouth Daily.   • atorvastatin (LIPITOR) 10 MG tablet 90 tablet 0     Sig: Take 1 tablet by mouth Daily.        Pharmacy where request should be sent: [facility] [place], [place] - [address] AT PORTAL TO REGISTERED [facility] SITES - [phone]  - [phone]      Additional details provided by patient:   Does the patient have less than a 3 day supply:  [x] Yes  [] No    Silvio Siddiqui Rep   11/23/21 13:42 EST

## 2022-01-26 ENCOUNTER — APPOINTMENT (OUTPATIENT)
Dept: WOMENS IMAGING | Facility: HOSPITAL | Age: 77
End: 2022-01-26

## 2022-01-26 PROCEDURE — 77063 BREAST TOMOSYNTHESIS BI: CPT | Performed by: RADIOLOGY

## 2022-01-26 PROCEDURE — 77067 SCR MAMMO BI INCL CAD: CPT | Performed by: RADIOLOGY

## 2022-02-07 DIAGNOSIS — E89.0 POSTOPERATIVE HYPOTHYROIDISM: ICD-10-CM

## 2022-02-07 DIAGNOSIS — I10 ESSENTIAL HYPERTENSION: ICD-10-CM

## 2022-02-07 DIAGNOSIS — E78.2 MIXED HYPERLIPIDEMIA: ICD-10-CM

## 2022-02-08 RX ORDER — LEVOTHYROXINE SODIUM 50 MCG
TABLET ORAL
Qty: 90 TABLET | Refills: 0 | Status: SHIPPED | OUTPATIENT
Start: 2022-02-08 | End: 2022-05-02

## 2022-02-08 RX ORDER — BENAZEPRIL HYDROCHLORIDE AND HYDROCHLOROTHIAZIDE 20; 12.5 MG/1; MG/1
TABLET ORAL
Qty: 90 TABLET | Refills: 0 | Status: SHIPPED | OUTPATIENT
Start: 2022-02-08 | End: 2022-05-02

## 2022-02-08 RX ORDER — ATORVASTATIN CALCIUM 10 MG/1
TABLET, FILM COATED ORAL
Qty: 90 TABLET | Refills: 0 | Status: SHIPPED | OUTPATIENT
Start: 2022-02-08 | End: 2022-05-02

## 2022-03-09 ENCOUNTER — OFFICE VISIT (OUTPATIENT)
Dept: FAMILY MEDICINE CLINIC | Facility: CLINIC | Age: 77
End: 2022-03-09

## 2022-03-09 VITALS
DIASTOLIC BLOOD PRESSURE: 78 MMHG | SYSTOLIC BLOOD PRESSURE: 136 MMHG | BODY MASS INDEX: 30.73 KG/M2 | OXYGEN SATURATION: 99 % | HEIGHT: 64 IN | HEART RATE: 77 BPM | WEIGHT: 180 LBS | RESPIRATION RATE: 18 BRPM

## 2022-03-09 DIAGNOSIS — I10 ESSENTIAL HYPERTENSION: Primary | ICD-10-CM

## 2022-03-09 PROCEDURE — 99213 OFFICE O/P EST LOW 20 MIN: CPT | Performed by: FAMILY MEDICINE

## 2022-03-09 NOTE — PROGRESS NOTES
"Chief Complaint  Follow-up (6 months)    Subjective    History of Present Illness {CC  Problem List  Visit  Diagnosis   Encounters  Notes  Medications  Labs  Result Review Imaging  Media :23}     Sagrario Varma presents to [facility] for Follow-up (6 months).  History of Present Illness     Here for general f/u, doesn't need anything in particular. Doing well with her antihypertensive regimen. Blood pressure is well controlled at this time. Getting around fairly well, trying to get regular exercise. Continues to eat a balanced diet. No need for refills at this time.    Patient has been erroneously marked as diabetic. Based on the available clinical information, she does not have diabetes and should therefore be excluded from diabetic health maintenance and quality measures for the remainder of the reporting period.    Objective     Vital Signs:   /78   Pulse 77   Resp 18   Ht 162.6 cm (64\")   Wt 81.6 kg (180 lb)   SpO2 99%   BMI 30.90 kg/m²   Physical Exam  Vitals and nursing note reviewed.   Constitutional:       General: She is not in acute distress.     Appearance: Normal appearance. She is not ill-appearing.   Cardiovascular:      Rate and Rhythm: Normal rate and regular rhythm.      Pulses: Normal pulses.      Heart sounds: Normal heart sounds. No murmur heard.  Pulmonary:      Effort: Pulmonary effort is normal. No respiratory distress.      Breath sounds: Normal breath sounds. No rales.   Neurological:      Mental Status: She is alert and oriented to person, place, and time. Mental status is at baseline.   Psychiatric:         Mood and Affect: Mood normal.         Behavior: Behavior normal.          Result Review  Data Reviewed:{ Labs  Result Review  Imaging  Med Tab  Media :23}                   Assessment and Plan {CC Problem List  Visit Diagnosis  ROS  Review (Popup)  Health Maintenance  Quality  BestPractice  Medications  SmartSets  " SnapShot Encounters  Media :23}   Diagnoses and all orders for this visit:    1. Essential hypertension (Primary)    Continue current regimen as prescribed, she will let me know if and when refills are needed.    Recommended follow-up as below. Encouraged communication via [employer] and [employer] the meantime.      Follow Up {Instructions Charge Capture  Follow-up Communications :23}     Patient was given instructions and counseling regarding her condition or for health maintenance advice. Please see specific information pulled into the AVS (placed there by myself) if appropriate.    Return in about 6 months (around 9/9/2022) for Medicare Wellness.      DAMON Delgado MD

## 2022-03-15 NOTE — PROGRESS NOTES
Subjective   Sagrario Varma is a 74 y.o. female.     74-year-old female presents today for follow-up on hypertension hyperlipidemia hypothyroidism.    Hypertension: Benazepril-HCTZ 20-12.5 mg a day.  Blood pressure in the office today 142/81.  Blood pressure ranges at home 120s/80s.  Denies chest pain shortness of breath headache or vision changes.    Hyperlipidemia atorvastatin 10 mg a day.  Lipid panel was last checked June 2019 and was normal.    Hypothyroidism: Secondary thyroidectomy due to multinodular goiter.  Patient is on Synthroid 50 mcg a day.  Thyroid panel was last checked in June 2019 and was normal.    History of DCIS breast cancer 10 years ago; follows with oncology once a year.    Rosacea: Doxycycline 40 mg a day; prescribed by her dermatologist    Osteoporosis: Prolia shots prescribed by her gynecologist.       PHQ-2/PHQ-9 Depression Screening 1/28/2020   Little interest or pleasure in doing things 0   Feeling down, depressed, or hopeless 0   Trouble falling or staying asleep, or sleeping too much -   Feeling tired or having little energy -   Poor appetite or overeating -   Feeling bad about yourself - or that you are a failure or have let yourself or your family down -   Trouble concentrating on things, such as reading the newspaper or watching television -   Moving or speaking so slowly that other people could have noticed. Or the opposite - being so fidgety or restless that you have been moving around a lot more than usual -   Thoughts that you would be better off dead, or of hurting yourself in some way -   Total Score 0   If you checked off any problems, how difficult have these problems made it for you to do your work, take care of things at home, or get along with other people? -       The following portions of the patient's history were reviewed and updated as appropriate: allergies, current medications, past family history, past medical history, past social history, past surgical history  and problem list.    Review of Systems   Constitutional: Negative for chills and fever.   HENT: Negative for congestion.    Respiratory: Negative for cough and shortness of breath.    Cardiovascular: Negative for chest pain, palpitations and leg swelling.   Gastrointestinal: Negative for abdominal pain, diarrhea and vomiting.       Objective   Physical Exam   Constitutional: She appears well-developed and well-nourished.   HENT:   Head: Normocephalic and atraumatic.   Mouth/Throat: Uvula is midline, oropharynx is clear and moist and mucous membranes are normal.   Eyes: Pupils are equal, round, and reactive to light. EOM are normal.   Cardiovascular: Normal rate and regular rhythm.   No murmur heard.  Pulmonary/Chest: Effort normal and breath sounds normal. No stridor. No respiratory distress. She has no wheezes. She has no rales.   Neurological: She is alert.   Skin: Skin is warm.   Psychiatric: She has a normal mood and affect. Her behavior is normal.               Assessment/Plan     Sagrario was seen today for hypertension.    Diagnoses and all orders for this visit:    Essential hypertension  -     benazepril-hydrochlorthiazide (LOTENSIN HCT) 20-12.5 MG per tablet; Take 1 tablet by mouth Daily.    Mixed hyperlipidemia  -     atorvastatin (LIPITOR) 10 MG tablet; Take 1 tablet by mouth Daily.    Postoperative hypothyroidism    Hypothyroidism, unspecified type  -     levothyroxine (SYNTHROID) 50 MCG tablet; Take 1 tablet by mouth Daily.        Return to clinic in 6 months for Medicare wellness visit.  1 week prior to get CMP CBC lipid panel TSH with reflex to T4 vitamin D labs done.  Continue care per oncology, gynecology, dermatology.   Return to clinic sooner if needed.   head

## 2022-04-09 ENCOUNTER — IMMUNIZATION (OUTPATIENT)
Dept: VACCINE CLINIC | Facility: HOSPITAL | Age: 77
End: 2022-04-09

## 2022-04-09 DIAGNOSIS — Z23 NEED FOR VACCINATION: Primary | ICD-10-CM

## 2022-04-09 PROCEDURE — 91305 HC SARSCOV2 VAC 30 MCG TRS-SUCR PFIZER: CPT | Performed by: INTERNAL MEDICINE

## 2022-04-09 PROCEDURE — 0054A HC ADM SARSCV2 30MCG TRS-SUCR BOOSTER: CPT | Performed by: INTERNAL MEDICINE

## 2022-04-29 DIAGNOSIS — E89.0 POSTOPERATIVE HYPOTHYROIDISM: ICD-10-CM

## 2022-04-29 DIAGNOSIS — E78.2 MIXED HYPERLIPIDEMIA: ICD-10-CM

## 2022-04-29 DIAGNOSIS — I10 ESSENTIAL HYPERTENSION: ICD-10-CM

## 2022-05-02 RX ORDER — BENAZEPRIL HYDROCHLORIDE AND HYDROCHLOROTHIAZIDE 20; 12.5 MG/1; MG/1
TABLET ORAL
Qty: 90 TABLET | Refills: 1 | Status: SHIPPED | OUTPATIENT
Start: 2022-05-02 | End: 2022-09-14 | Stop reason: SDUPTHER

## 2022-05-02 RX ORDER — LEVOTHYROXINE SODIUM 50 MCG
TABLET ORAL
Qty: 90 TABLET | Refills: 1 | Status: SHIPPED | OUTPATIENT
Start: 2022-05-02 | End: 2022-09-14 | Stop reason: SDUPTHER

## 2022-05-02 RX ORDER — ATORVASTATIN CALCIUM 10 MG/1
TABLET, FILM COATED ORAL
Qty: 90 TABLET | Refills: 1 | Status: SHIPPED | OUTPATIENT
Start: 2022-05-02 | End: 2022-09-14 | Stop reason: SDUPTHER

## 2022-09-14 ENCOUNTER — OFFICE VISIT (OUTPATIENT)
Dept: FAMILY MEDICINE CLINIC | Facility: CLINIC | Age: 77
End: 2022-09-14

## 2022-09-14 VITALS
OXYGEN SATURATION: 96 % | DIASTOLIC BLOOD PRESSURE: 82 MMHG | HEIGHT: 64 IN | SYSTOLIC BLOOD PRESSURE: 138 MMHG | RESPIRATION RATE: 19 BRPM | WEIGHT: 179 LBS | BODY MASS INDEX: 30.56 KG/M2 | HEART RATE: 82 BPM

## 2022-09-14 DIAGNOSIS — Z00.00 ROUTINE HEALTH MAINTENANCE: Primary | ICD-10-CM

## 2022-09-14 DIAGNOSIS — E89.0 POSTOPERATIVE HYPOTHYROIDISM: ICD-10-CM

## 2022-09-14 DIAGNOSIS — E55.9 VITAMIN D DEFICIENCY: ICD-10-CM

## 2022-09-14 DIAGNOSIS — E04.2 NON-TOXIC MULTINODULAR GOITER: ICD-10-CM

## 2022-09-14 DIAGNOSIS — I10 ESSENTIAL HYPERTENSION: ICD-10-CM

## 2022-09-14 DIAGNOSIS — H35.3211 EXUDATIVE AGE-RELATED MACULAR DEGENERATION, RIGHT EYE, WITH ACTIVE CHOROIDAL NEOVASCULARIZATION: ICD-10-CM

## 2022-09-14 DIAGNOSIS — E78.2 MIXED HYPERLIPIDEMIA: ICD-10-CM

## 2022-09-14 DIAGNOSIS — E11.9 TYPE 2 DIABETES MELLITUS WITHOUT COMPLICATION, WITHOUT LONG-TERM CURRENT USE OF INSULIN: ICD-10-CM

## 2022-09-14 PROCEDURE — 1170F FXNL STATUS ASSESSED: CPT | Performed by: FAMILY MEDICINE

## 2022-09-14 PROCEDURE — 1160F RVW MEDS BY RX/DR IN RCRD: CPT | Performed by: FAMILY MEDICINE

## 2022-09-14 PROCEDURE — G0439 PPPS, SUBSEQ VISIT: HCPCS | Performed by: FAMILY MEDICINE

## 2022-09-14 RX ORDER — ATORVASTATIN CALCIUM 10 MG/1
10 TABLET, FILM COATED ORAL DAILY
Qty: 90 TABLET | Refills: 3 | Status: SHIPPED | OUTPATIENT
Start: 2022-09-14

## 2022-09-14 RX ORDER — LEVOTHYROXINE SODIUM 0.05 MG/1
50 TABLET ORAL DAILY
Qty: 90 TABLET | Refills: 3 | Status: SHIPPED | OUTPATIENT
Start: 2022-09-14

## 2022-09-14 RX ORDER — BENAZEPRIL HYDROCHLORIDE AND HYDROCHLOROTHIAZIDE 20; 12.5 MG/1; MG/1
1 TABLET ORAL DAILY
Qty: 90 TABLET | Refills: 3 | Status: SHIPPED | OUTPATIENT
Start: 2022-09-14

## 2022-09-14 NOTE — PROGRESS NOTES
The ABCs of the Annual Wellness Visit  Subsequent Medicare Wellness Visit    Chief Complaint   Patient presents with   • Annual Exam     Subsequent Medicare Wellness      Subjective    History of Present Illness:  Sagrario Varma is a 76 y.o. female who presents for a Subsequent Medicare Wellness Visit.    Doing well overall today. Due for some routine blood work. Also due for refills of some medications. No real questions or concerns at this time. Reports good adherence and tolerance. Stays active and eats a balanced diet.    The following portions of the patient's history were reviewed and   updated as appropriate: allergies, current medications, past family history, past medical history, past social history, past surgical history and problem list.    Compared to one year ago, the patient feels her physical   health is better.    Compared to one year ago, the patient feels her mental   health is the same.    Recent Hospitalizations:  She was not admitted to the hospital during the last year.       Current Medical Providers:  Patient Care Team:  Sumeet Delgado MD as PCP - General (Family Medicine)  Ines Zavala MD as Consulting Physician (Hematology and Oncology)  Simba Reid MD as Referring Physician (General Surgery)    Outpatient Medications Prior to Visit   Medication Sig Dispense Refill   • Aflibercept 2 MG/0.05ML solution      • Ascorbic Acid (VITAMIN C) 500 MG capsule Take 1 capsule by mouth.     • Calcium 500-125 MG-UNIT tablet Take 1 tablet by mouth.     • cetirizine (zyrTEC) 10 MG tablet      • Cholecalciferol (VITAMIN D) 1000 UNITS tablet Take 1,000 Units by mouth.     • Denosumab (PROLIA SC) Inject  under the skin Every 6 (Six) Months.     • doxycycline (VIBRAMYCIN) 100 MG capsule Take 100 mg by mouth Daily.     • metroNIDAZOLE (METROCREAM) 0.75 % cream Apply  topically to the appropriate area as directed 2 (Two) Times a Day.     • Multiple Vitamins-Minerals (MULTIVITAMIN ADULT PO)  "Take  by mouth.     • Multiple Vitamins-Minerals (PRESERVISION AREDS 2 PO) Take  by mouth 2 (Two) Times a Day.     • sulfacetamide sodium-sulfur (AVAR,PLEXION) 10-5 % topical emulsion      • aspirin 81 MG tablet Take 81 mg by mouth Daily.     • atorvastatin (LIPITOR) 10 MG tablet TAKE 1 TABLET DAILY 90 tablet 1   • benazepril-hydrochlorthiazide (LOTENSIN HCT) 20-12.5 MG per tablet TAKE 1 TABLET DAILY 90 tablet 1   • Synthroid 50 MCG tablet TAKE 1 TABLET DAILY 90 tablet 1     No facility-administered medications prior to visit.       No opioid medication identified on active medication list. I have reviewed chart for other potential  high risk medication/s and harmful drug interactions in the elderly.          Aspirin is on active medication list. Aspirin use is contraindicated for this patient due to: age and current recommendations.  Patient has been instructed to discontinue this medication. .      Patient Active Problem List   Diagnosis   • Generalized osteoarthritis   • Mixed hyperlipidemia   • Essential hypertension   • Non-toxic multinodular goiter   • Impaired glucose tolerance   • Vitamin D deficiency   • DCIS (ductal carcinoma in situ)   • Family history of non-Hodgkin's lymphoma   • Family history of Hodgkin's lymphoma   • Family history of breast cancer in female   • Adhesive capsulitis of left shoulder   • Macular degeneration of both eyes   • Postoperative hypothyroidism   • Age related osteoporosis   • Screening mammogram, encounter for    • Mass of axillary tail of left breast      Advance Care Planning  Advance Directive is not on file.  ACP discussion was held with the patient during this visit. Patient does not have an advance directive, information provided.          Objective    Vitals:    09/14/22 0759   BP: 138/82   Pulse: 82   Resp: 19   SpO2: 96%   Weight: 81.2 kg (179 lb)   Height: 162.6 cm (64\")     Estimated body mass index is 30.73 kg/m² as calculated from the following:    Height as of " "this encounter: 162.6 cm (64\").    Weight as of this encounter: 81.2 kg (179 lb).    BMI is >= 30 and <35. (Class 1 Obesity). The following options were offered after discussion;: exercise counseling/recommendations and nutrition counseling/recommendations      Does the patient have evidence of cognitive impairment? No    Physical Exam  Vitals and nursing note reviewed.   Constitutional:       General: She is not in acute distress.     Appearance: Normal appearance. She is not ill-appearing.   Cardiovascular:      Rate and Rhythm: Normal rate and regular rhythm.      Pulses: Normal pulses.           Dorsalis pedis pulses are 2+ on the right side and 2+ on the left side.        Posterior tibial pulses are 2+ on the right side and 2+ on the left side.      Heart sounds: Normal heart sounds. No murmur heard.  Pulmonary:      Effort: Pulmonary effort is normal. No respiratory distress.      Breath sounds: Normal breath sounds. No rales.   Musculoskeletal:      Right foot: Normal range of motion. No deformity or bunion.      Left foot: Normal range of motion. No deformity or bunion.   Feet:      Right foot:      Protective Sensation: 8 sites tested. 8 sites sensed.      Skin integrity: Skin integrity normal.      Toenail Condition: Right toenails are normal.      Left foot:      Protective Sensation: 8 sites tested. 8 sites sensed.      Skin integrity: Skin integrity normal.      Toenail Condition: Left toenails are normal.   Neurological:      Mental Status: She is alert and oriented to person, place, and time. Mental status is at baseline.   Psychiatric:         Mood and Affect: Mood normal.         Behavior: Behavior normal.                 HEALTH RISK ASSESSMENT    Smoking Status:  Social History     Tobacco Use   Smoking Status Never Smoker   Smokeless Tobacco Never Used     Alcohol Consumption:  Social History     Substance and Sexual Activity   Alcohol Use No     Fall Risk Screen:    STEADI Fall Risk Assessment " was completed, and patient is at LOW risk for falls.Assessment completed on:9/14/2022    Depression Screening:  PHQ-2/PHQ-9 Depression Screening 9/14/2022   Retired PHQ-9 Total Score -   Retired Total Score -   Little Interest or Pleasure in Doing Things 0-->not at all   Feeling Down, Depressed or Hopeless 0-->not at all   PHQ-9: Brief Depression Severity Measure Score 0       Health Habits and Functional and Cognitive Screening:  Functional & Cognitive Status 9/14/2022   Do you have difficulty preparing food and eating? No   Do you have difficulty bathing yourself, getting dressed or grooming yourself? No   Do you have difficulty using the toilet? No   Do you have difficulty moving around from place to place? No   Do you have trouble with steps or getting out of a bed or a chair? No   Current Diet Well Balanced Diet   Dental Exam Up to date   Eye Exam Up to date   Exercise (times per week) 3 times per week   Current Exercises Include Walking   Current Exercise Activities Include -   Do you need help using the phone?  No   Are you deaf or do you have serious difficulty hearing?  No   Do you need help with transportation? No   Do you need help shopping? No   Do you need help preparing meals?  No   Do you need help with housework?  No   Do you need help with laundry? No   Do you need help taking your medications? No   Do you need help managing money? No   Do you ever drive or ride in a car without wearing a seat belt? No   Have you felt unusual stress, anger or loneliness in the last month? No   Who do you live with? Spouse   If you need help, do you have trouble finding someone available to you? No   Have you been bothered in the last four weeks by sexual problems? -   Do you have difficulty concentrating, remembering or making decisions? No       Age-appropriate Screening Schedule:  Refer to the list below for future screening recommendations based on patient's age, sex and/or medical conditions. Orders for these  recommended tests are listed in the plan section. The patient has been provided with a written plan.    Health Maintenance   Topic Date Due   • DIABETIC FOOT EXAM  Never done   • HEMOGLOBIN A1C  02/10/2021   • LIPID PANEL  09/09/2022   • INFLUENZA VACCINE  10/01/2022   • MAMMOGRAM  01/26/2023   • DIABETIC EYE EXAM  06/23/2023   • DXA SCAN  08/16/2024   • TDAP/TD VACCINES (3 - Td or Tdap) 01/04/2028   • ZOSTER VACCINE  Completed   • URINE MICROALBUMIN  Discontinued              Assessment & Plan   CMS Preventative Services Quick Reference  Risk Factors Identified During Encounter  Immunizations Discussed/Encouraged (specific Immunizations; COVID19  Obesity/Overweight   The above risks/problems have been discussed with the patient.  Follow up actions/plans if indicated are seen below in the Assessment/Plan Section.  Pertinent information has been shared with the patient in the After Visit Summary.    Diagnoses and all orders for this visit:    1. Routine health maintenance (Primary)    2. Type 2 diabetes mellitus without complication, without long-term current use of insulin (HCC)  -     Comprehensive Metabolic Panel  -     Hemoglobin A1c    3. Mixed hyperlipidemia  -     Comprehensive Metabolic Panel  -     Lipid Panel  -     atorvastatin (LIPITOR) 10 MG tablet; Take 1 tablet by mouth Daily.  Dispense: 90 tablet; Refill: 3    4. Essential hypertension  -     Comprehensive Metabolic Panel  -     benazepril-hydrochlorthiazide (LOTENSIN HCT) 20-12.5 MG per tablet; Take 1 tablet by mouth Daily.  Dispense: 90 tablet; Refill: 3    5. Non-toxic multinodular goiter  -     TSH Rfx On Abnormal To Free T4  -     levothyroxine (Synthroid) 50 MCG tablet; Take 1 tablet by mouth Daily.  Dispense: 90 tablet; Refill: 3    6. Postoperative hypothyroidism  -     TSH Rfx On Abnormal To Free T4  -     levothyroxine (Synthroid) 50 MCG tablet; Take 1 tablet by mouth Daily.  Dispense: 90 tablet; Refill: 3    7. Vitamin D deficiency  -      Vitamin D 25 Hydroxy    8. Exudative age-related macular degeneration, right eye, with active choroidal neovascularization (HCC)    Orders as above. I will contact her with results as available. Continue regimen as prescribed for now. She will let me know as refills are needed.    Encouraged to continue with healthy lifestyle habits. Recommended follow-up as below. Encouraged communication via [employer] in the meantime.    Follow Up:   Return in about 1 year (around 9/14/2023), or if symptoms worsen or fail to improve, for Medicare Wellness.     An After Visit Summary and PPPS were made available to the patient.

## 2022-09-15 LAB
25(OH)D3+25(OH)D2 SERPL-MCNC: 50.7 NG/ML (ref 30–100)
ALBUMIN SERPL-MCNC: 4.3 G/DL (ref 3.5–5.2)
ALBUMIN/GLOB SERPL: 1.9 G/DL
ALP SERPL-CCNC: 115 U/L (ref 39–117)
ALT SERPL-CCNC: 28 U/L (ref 1–33)
AST SERPL-CCNC: 27 U/L (ref 1–32)
BILIRUB SERPL-MCNC: 0.4 MG/DL (ref 0–1.2)
BUN SERPL-MCNC: 10 MG/DL (ref 8–23)
BUN/CREAT SERPL: 14.7 (ref 7–25)
CALCIUM SERPL-MCNC: 9.6 MG/DL (ref 8.6–10.5)
CHLORIDE SERPL-SCNC: 99 MMOL/L (ref 98–107)
CHOLEST SERPL-MCNC: 162 MG/DL (ref 0–200)
CO2 SERPL-SCNC: 33.6 MMOL/L (ref 22–29)
CREAT SERPL-MCNC: 0.68 MG/DL (ref 0.57–1)
EGFRCR-CYS SERPLBLD CKD-EPI 2021: 91 ML/MIN/1.73
GLOBULIN SER CALC-MCNC: 2.3 GM/DL
GLUCOSE SERPL-MCNC: 91 MG/DL (ref 65–99)
HBA1C MFR BLD: 5.7 % (ref 4.8–5.6)
HDLC SERPL-MCNC: 50 MG/DL (ref 40–60)
LDLC SERPL CALC-MCNC: 97 MG/DL (ref 0–100)
POTASSIUM SERPL-SCNC: 4.6 MMOL/L (ref 3.5–5.2)
PROT SERPL-MCNC: 6.6 G/DL (ref 6–8.5)
SODIUM SERPL-SCNC: 140 MMOL/L (ref 136–145)
TRIGL SERPL-MCNC: 76 MG/DL (ref 0–150)
TSH SERPL DL<=0.005 MIU/L-ACNC: 2.89 UIU/ML (ref 0.27–4.2)
VLDLC SERPL CALC-MCNC: 15 MG/DL (ref 5–40)

## 2022-10-21 ENCOUNTER — OFFICE VISIT (OUTPATIENT)
Dept: ORTHOPEDIC SURGERY | Facility: CLINIC | Age: 77
End: 2022-10-21

## 2022-10-21 VITALS — WEIGHT: 179 LBS | TEMPERATURE: 97.1 F | BODY MASS INDEX: 30.56 KG/M2 | HEIGHT: 64 IN

## 2022-10-21 DIAGNOSIS — M19.019 ARTHRITIS OF SHOULDER: ICD-10-CM

## 2022-10-21 DIAGNOSIS — R52 PAIN: Primary | ICD-10-CM

## 2022-10-21 PROCEDURE — 73030 X-RAY EXAM OF SHOULDER: CPT | Performed by: ORTHOPAEDIC SURGERY

## 2022-10-21 PROCEDURE — 99214 OFFICE O/P EST MOD 30 MIN: CPT | Performed by: ORTHOPAEDIC SURGERY

## 2022-10-21 RX ORDER — ACETAMINOPHEN 325 MG/1
1000 TABLET ORAL ONCE
Status: CANCELLED | OUTPATIENT
Start: 2023-01-19 | End: 2022-10-21

## 2022-10-21 RX ORDER — PREGABALIN 75 MG/1
150 CAPSULE ORAL ONCE
Status: CANCELLED | OUTPATIENT
Start: 2023-01-19 | End: 2022-10-21

## 2022-10-21 RX ORDER — CEFAZOLIN SODIUM 2 G/100ML
2 INJECTION, SOLUTION INTRAVENOUS ONCE
Status: CANCELLED | OUTPATIENT
Start: 2023-01-19 | End: 2022-10-21

## 2022-10-21 RX ORDER — MELOXICAM 15 MG/1
15 TABLET ORAL ONCE
Status: CANCELLED | OUTPATIENT
Start: 2023-01-19 | End: 2022-10-21

## 2022-10-21 NOTE — PROGRESS NOTES
Patient: Sagrario Varma    YOB: 1945    Medical Record Number: [id number]    Chief Complaints:  Left shoulder pain    History of Present Illness:     76 y.o. female patient follows up for her left shoulder.  I have seen her many times over the past few years.  Recently, she has been following up with [facility] for injections.  Initially the injections were quite helpful but the last few have not done much for her.  She reports that her pain is getting progressively worse.  She is at the point where she has moderate to severe constant pain.  She has very limited motion and is unable to reach or lift above her shoulder with her left arm.  She comes in today wanting to discuss the option of an arthroplasty.  At this point, she has tried activity modifications, anti-inflammatories, home exercises and multiple injections over the years.    Allergies:   Allergies   Allergen Reactions   • Penicillins Hives and Swelling   • Morphine Nausea And Vomiting       Home Medications:    Current Outpatient Medications:   •  Aflibercept 2 MG/0.05ML solution, , Disp: , Rfl:   •  Ascorbic Acid (VITAMIN C) 500 MG capsule, Take 1 capsule by mouth., Disp: , Rfl:   •  aspirin 81 MG oral suspension, , Disp: , Rfl:   •  atorvastatin (LIPITOR) 10 MG tablet, Take 1 tablet by mouth Daily., Disp: 90 tablet, Rfl: 3  •  benazepril-hydrochlorthiazide (LOTENSIN HCT) 20-12.5 MG per tablet, Take 1 tablet by mouth Daily., Disp: 90 tablet, Rfl: 3  •  Calcium 500-125 MG-UNIT tablet, Take 1 tablet by mouth., Disp: , Rfl:   •  cetirizine (zyrTEC) 10 MG tablet, , Disp: , Rfl:   •  Cholecalciferol (VITAMIN D) 1000 UNITS tablet, Take 1,000 Units by mouth., Disp: , Rfl:   •  Denosumab (PROLIA SC), Inject  under the skin Every 6 (Six) Months., Disp: , Rfl:   •  doxycycline (VIBRAMYCIN) 100 MG capsule, Take 100 mg by mouth Daily., Disp: , Rfl:   •  levothyroxine (Synthroid) 50 MCG tablet, Take 1 tablet by mouth Daily., Disp: 90 tablet, Rfl: 3  •   metroNIDAZOLE (METROCREAM) 0.75 % cream, Apply  topically to the appropriate area as directed 2 (Two) Times a Day., Disp: , Rfl:   •  Multiple Vitamins-Minerals (MULTIVITAMIN ADULT PO), Take  by mouth., Disp: , Rfl:   •  Multiple Vitamins-Minerals (PRESERVISION AREDS 2 PO), Take  by mouth 2 (Two) Times a Day., Disp: , Rfl:   •  sulfacetamide sodium-sulfur (AVAR,PLEXION) 10-5 % topical emulsion, , Disp: , Rfl:     Past Medical History:   Diagnosis Date   • Age related osteoporosis 03/23/2021   • Arthritis of back Years ago   • Cancer (HCC)    • Cataract Recent   • Cervical disc disorder Jul 2010    Narrowing of C5 & C7 noted   • Colon polyp    • DCIS (ductal carcinoma in situ)     right   • Fracture of wrist 1951    Left wrist   • Fracture, clavicle Don't recall    Was told I had one   • Fracture, humerus 1951    Left arm   • Fracture, ulna 1951    Left arm   • Fractures 1951    Left arm, 3 places   • HBP (high blood pressure)     hypertension   • Hypercholesterolemia    • Multinodular goiter     of thyroid gland   • Osteoarthritis    • Periarthritis of shoulder     Saw Dr. Galeas for this previously.   • Postoperative hypothyroidism 01/28/2020   • Rotator cuff syndrome Years ago   • Thyroid nodule 12/23/2013   • Visual impairment 3/2019    Macular degeneration       Past Surgical History:   Procedure Laterality Date   • APPENDECTOMY  01/1961   • BREAST LUMPECTOMY Right 05/2010   • COLONOSCOPY  Feb 2022    No problems found   • DILATATION AND CURETTAGE      x2 for excessive bleeding   • JOINT REPLACEMENT  Apr 2014    Right TKR   • KNEE SURGERY     • OVARIAN CYST SURGERY  01/1961   • THYROIDECTOMY     • TRIGGER POINT INJECTION  Mar 2019    5 in left shoulder   • WRIST SURGERY  1951       Social History     Occupational History   • Occupation: [employer]     Employer: RETIRED   Tobacco Use   • Smoking status: Never   • Smokeless tobacco: Never   Vaping Use   • Vaping Use: Never used   Substance and Sexual  Activity   • Alcohol use: No   • Drug use: No   • Sexual activity: Yes     Partners: Male     Birth control/protection: None      Social History     Social History Narrative    Retired, former retail and        Family History   Problem Relation Age of Onset   • Hyperlipidemia Other    • Hypertension Other    • Stroke Other    • Kidney failure Other    • Diabetes Other    • Heart disease Other    • Melanoma Other    • Stroke Mother    • Hyperlipidemia Mother    • Kidney disease Mother    • Vision loss Mother    • Diabetes Mother    • Thrombosis Father    • Hyperlipidemia Father    • Breast cancer Sister 50        Metastatic   • Cancer Sister         Metastatic breast cancer   • Diabetes Sister         Likely due to cancer treatment   • Melanoma Brother 41   • Hodgkin's lymphoma Brother 41        non-Hodgkin lymphoma   • Cancer Brother         NHL, Lymphoma   • Cancer Paternal Aunt    • Cancer Sister         Breast cancer   • Cancer Maternal Aunt         Lung cancer       Review of Systems:      Constitutional: Denies fever, shaking or chills   Eyes: Denies change in visual acuity   HEENT: Denies nasal congestion or sore throat   Respiratory: Denies cough or shortness of breath   Cardiovascular: Denies chest pain or edema  Endocrine: Denies tremors, palpitations, intolerance of heat or cold, polyuria, polydipsia.  GI: Denies abdominal pain, nausea, vomiting, bloody stools or diarrhea  : Denies frequency, urgency, incontinence, retention, or nocturia.  Musculoskeletal: Denies numbness, tingling or loss of motor function   Integument: Denies rash, lesion or ulceration   Neurologic: Denies headache or focal weakness, deficits  Heme: Denies spontaneous or excessive bleeding, epistaxis, hematuria, melena, fatigue, enlarged or tender lymph nodes.      All other pertinent positives and negatives as noted above in HPI.    Physical Exam:   76 y.o. female  Vitals:    10/21/22 0805   Temp: 97.1 °F (36.2 °C)  "  [facility]: Temporal   Weight: 81.2 kg (179 lb)   Height: 162.2 cm (63.86\")       General:  Patient is awake and alert.  Appears in no acute distress or discomfort.    Psych:  Affect and demeanor are appropriate.    Extremities:  Left shoulder is examined.  Skin is benign.  No obvious gross abnormalities.  No palpable masses or adenopathy.  Moderate tenderness noted over anterior glenohumeral joint and rotator interval.  Motion is to 75 degrees of forward elevation, 10 degrees external rotation, side pocket internal rotation.  She has crepitus with range of motion.  Her deltoid fires on exam and she has intact axillary nerve sensation.  With her elbows at the side, she can internally and externally rotate with good strength.  She is weak with abduction and forward elevation.         Radiology:  AP, scapular Y, and axillary views of the left shoulder are ordered by myself and reviewed to evaluate the patient's complaint.  These are compared to previous x-rays.  She has end-stage glenohumeral osteoarthritis with bone-on-bone, glenoid erosion, subchondral sclerosis and osteophyte formation.    Assessment/Plan: Left shoulder end-stage osteoarthritis    We discussed treatment options in detail.  She had a good understanding of this information.  She feels like it is time to move forward with the replacement.  Due to her glenoid wear, I consider that she would be best served by reverse rather than an anatomic replacement.  The risk, benefits and alternatives were discussed.  She consents to proceed with that.  We will look at getting this scheduled for her.  She is hoping to do this in early January.  I will have my  contact her.  She will follow-up with myself or [facility] for preoperative visit    Juan Galeas MD    10/21/2022    CC to Sumeet Delgado MD    "

## 2022-11-11 ENCOUNTER — LAB (OUTPATIENT)
Dept: LAB | Facility: HOSPITAL | Age: 77
End: 2022-11-11

## 2022-11-11 ENCOUNTER — OFFICE VISIT (OUTPATIENT)
Dept: ONCOLOGY | Facility: CLINIC | Age: 77
End: 2022-11-11

## 2022-11-11 VITALS
BODY MASS INDEX: 30.19 KG/M2 | RESPIRATION RATE: 18 BRPM | HEIGHT: 64 IN | TEMPERATURE: 97.5 F | WEIGHT: 176.8 LBS | HEART RATE: 75 BPM | DIASTOLIC BLOOD PRESSURE: 92 MMHG | OXYGEN SATURATION: 98 % | SYSTOLIC BLOOD PRESSURE: 130 MMHG

## 2022-11-11 DIAGNOSIS — D05.10 DUCTAL CARCINOMA IN SITU (DCIS) OF BREAST, UNSPECIFIED LATERALITY: ICD-10-CM

## 2022-11-11 DIAGNOSIS — Z12.31 SCREENING MAMMOGRAM, ENCOUNTER FOR: Primary | ICD-10-CM

## 2022-11-11 DIAGNOSIS — Z80.3 FAMILY HISTORY OF BREAST CANCER IN FEMALE: ICD-10-CM

## 2022-11-11 DIAGNOSIS — D64.9 ANEMIA, UNSPECIFIED TYPE: ICD-10-CM

## 2022-11-11 DIAGNOSIS — Z80.3 FAMILY HISTORY OF BREAST CANCER: ICD-10-CM

## 2022-11-11 DIAGNOSIS — N63.32 MASS OF AXILLARY TAIL OF LEFT BREAST: ICD-10-CM

## 2022-11-11 LAB
ALBUMIN SERPL-MCNC: 4.4 G/DL (ref 3.5–5.2)
ALBUMIN/GLOB SERPL: 1.5 G/DL (ref 1.1–2.4)
ALP SERPL-CCNC: 107 U/L (ref 38–116)
ALT SERPL W P-5'-P-CCNC: 24 U/L (ref 0–33)
ANION GAP SERPL CALCULATED.3IONS-SCNC: 11.1 MMOL/L (ref 5–15)
AST SERPL-CCNC: 26 U/L (ref 0–32)
BASOPHILS # BLD AUTO: 0.1 10*3/MM3 (ref 0–0.2)
BASOPHILS NFR BLD AUTO: 1.4 % (ref 0–1.5)
BILIRUB SERPL-MCNC: 0.4 MG/DL (ref 0.2–1.2)
BUN SERPL-MCNC: 11 MG/DL (ref 6–20)
BUN/CREAT SERPL: 16.2 (ref 7.3–30)
CALCIUM SPEC-SCNC: 9.9 MG/DL (ref 8.5–10.2)
CHLORIDE SERPL-SCNC: 103 MMOL/L (ref 98–107)
CO2 SERPL-SCNC: 27.9 MMOL/L (ref 22–29)
CREAT SERPL-MCNC: 0.68 MG/DL (ref 0.6–1.1)
DEPRECATED RDW RBC AUTO: 43.6 FL (ref 37–54)
EGFRCR SERPLBLD CKD-EPI 2021: 90.4 ML/MIN/1.73
EOSINOPHIL # BLD AUTO: 0.22 10*3/MM3 (ref 0–0.4)
EOSINOPHIL NFR BLD AUTO: 3.2 % (ref 0.3–6.2)
ERYTHROCYTE [DISTWIDTH] IN BLOOD BY AUTOMATED COUNT: 13.1 % (ref 12.3–15.4)
GLOBULIN UR ELPH-MCNC: 3 GM/DL (ref 1.8–3.5)
GLUCOSE SERPL-MCNC: 91 MG/DL (ref 74–124)
HCT VFR BLD AUTO: 41.8 % (ref 34–46.6)
HGB BLD-MCNC: 13.8 G/DL (ref 12–15.9)
IMM GRANULOCYTES # BLD AUTO: 0.02 10*3/MM3 (ref 0–0.05)
IMM GRANULOCYTES NFR BLD AUTO: 0.3 % (ref 0–0.5)
LYMPHOCYTES # BLD AUTO: 2.09 10*3/MM3 (ref 0.7–3.1)
LYMPHOCYTES NFR BLD AUTO: 30.3 % (ref 19.6–45.3)
MCH RBC QN AUTO: 30.1 PG (ref 26.6–33)
MCHC RBC AUTO-ENTMCNC: 33 G/DL (ref 31.5–35.7)
MCV RBC AUTO: 91.1 FL (ref 79–97)
MONOCYTES # BLD AUTO: 0.68 10*3/MM3 (ref 0.1–0.9)
MONOCYTES NFR BLD AUTO: 9.9 % (ref 5–12)
NEUTROPHILS NFR BLD AUTO: 3.79 10*3/MM3 (ref 1.7–7)
NEUTROPHILS NFR BLD AUTO: 54.9 % (ref 42.7–76)
NRBC BLD AUTO-RTO: 0 /100 WBC (ref 0–0.2)
PLATELET # BLD AUTO: 326 10*3/MM3 (ref 140–450)
PMV BLD AUTO: 9.3 FL (ref 6–12)
POTASSIUM SERPL-SCNC: 5.1 MMOL/L (ref 3.5–4.7)
PROT SERPL-MCNC: 7.4 G/DL (ref 6.3–8)
RBC # BLD AUTO: 4.59 10*6/MM3 (ref 3.77–5.28)
SODIUM SERPL-SCNC: 142 MMOL/L (ref 134–145)
WBC NRBC COR # BLD: 6.9 10*3/MM3 (ref 3.4–10.8)

## 2022-11-11 PROCEDURE — 99214 OFFICE O/P EST MOD 30 MIN: CPT | Performed by: INTERNAL MEDICINE

## 2022-11-11 PROCEDURE — 85025 COMPLETE CBC W/AUTO DIFF WBC: CPT

## 2022-11-11 PROCEDURE — 36415 COLL VENOUS BLD VENIPUNCTURE: CPT

## 2022-11-11 PROCEDURE — 80053 COMPREHEN METABOLIC PANEL: CPT

## 2022-11-11 RX ORDER — FARICIMAB 6 MG/.05ML
INJECTION, SOLUTION INTRAVITREAL
COMMUNITY
Start: 2022-03-31

## 2022-11-11 NOTE — PROGRESS NOTES
Subjective .     REASONS FOR FOLLOWUP:    1. 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   2. Status post partial thyroidectomy.   3. Status post knee surgery.     HISTORY OF PRESENT ILLNESS:  The patient is a 77 y.o. year old female who is here for follow-up with the above-mentioned history.    History of Present Illness     The patient is a 67-year-old female with the above history, currently here for followup. She has completed 5 years of tamoxifen as of 2015.  She was initially diagnosed in .  She comes here yearly now.  She has a family history of sister having had breast cancer and is followed here by Dr. Moya.  She states that her sister was tested for genetics counseling and was negative.  Currently patient is asymptomatic.      Even though patient is 10 years out she prefers to follow-up here as she has got a strong family history of malignancy.  She is currently followed with observation.  Her mammogram was 2021 which is negative.    Interval history:  Patient has been followed here for quite some time since .  She does not want to be released from here.  She has 2 sisters both of them had breast cancer and one of them was following up with Dr. Moya in our group and  after 19 years with breast cancer.  Her other sister also has breast cancer.  She has patient has been tested and undergone genetic testing which has been negative.  I do not see the report of the genetic testing in the computer.  We will see if we can get it from [facility].    PAST MEDICAL HISTORY:   4. Hypertension.   5. High cholesterol.   6. Multinodular goiter of the thyroid gland. She did undergo ultrasound of the thyroid gland and found multinodular goiter. She is followed by Dr. Restrepo for that. She has had a biopsy in the past which was negative.   7. Dilatation and curettage x2 for excessive bleeding.     OB/GYN HISTORY:  She started menstrual period at age 12. She obtained menopause at age 56. She is  4, para 3, one stillborn. She has 3 children, two daughters and one son, 37, 35 and 28 year old with normal deliveries.     ONCOLOGIC HISTORY:      The patient was following up with yearly mammogram. She had her yearly mammogram done 03/30/ 20 10 by Dr. Napoles her primary care physician which showed calcifications in the right breast with additional evaluation and as a result she repeated the mammogram on 2010 which showed calcifications in the right breast which were suspicious in the anterior one-third of the right breast at the 12 o' clock position 2 cm away from the nipple.    Subsequently the patient underwent biopsy on 0 4/16/20 10 done at [facility], #[id number]. Path is consistent with ductal carcinoma in situ low to intermediate grade, solid and cribriform types, at least 5 mm in maximum dimension. There was no invasive carcinoma identified and there was some fibrocystic changes identified. Hormonal assays were performed, ER positive at 99%, DC positive at 99%, HER/2 was 1+ and Ki-67 was 6.      She subsequently was referred to Dr. Reid and MRI of the breast was also obtained. MRI of the breast 0 4/24/20 10 had shown post biopsy cavity within the right breast at the 12 o'clock position with an internal metallic clip but no suspicio us surrounding or residual enhancement seen in the right breast. There was no evidence of any axillary adenopathy. There were no suspicious findings on the left breast.      The patient then was admitted to [facility] at which time she underwent surgery by Dr. Reid 0 5/19/20 10. Pathology #[id number]. She underwent right breast lumpectomy with needle localization. The right breast lumpectomy showed breast tissue with single focus of atypical ductal hyperplasia adjacent to the biopsy site. Res idual carcinoma was not identified. There were some fibrocystic changes  including ductal hyperplasia without atypia. The additional superior margin on the right breast showed duct hyperplasia without atypia and the deep margin also showed some duct hype rplasia without atypia.      As a result the patient was then referred to Dr. Sumeet Reddy for evaluation of brachytherapy. The patient then received brachytherapy with 10 total fractions given twice daily over a 5- treatment- day period and was prescribed 350 cGy per treatment fraction. The total dose she received was 3400 cGy in 10 fractions. Currently she is healed from surgery and is here for further recommendations.      Tamoxifen chemoprophylaxis started on 06/22/2010 to complete a total of 5 years.    Mammogram from 12/10/20 11 showed a new oval mass of 4.5 cm. She had a few round calcifications and postsurgical scar in the anterior one-third region of the right breast at the 12 o'clock positive. This area was thought to be consistent with seroma and pos tsurgical change, however, repeat mammogram was suggested in 6 months.        Dr. Reid aspirated 25 cc of clear fluid on 12/14/10 from the right side and the pathology, accession #[id number], was thought to have adipose stroma tissue, histiocytes and inflammat ory cells. No malignant cells were identified, consistent with fat necrosis. However, repeat mammogram and ultrasound are to be done because of the palpable mass, which is significantly large.      The patient underwent radiation 3400 cGy depth of 1 cm from the MammoSite balloon surface, given 10 fractions of 340 cGy each. Treatments were delivered for 5  treatment days, given twice daily. She completed her brachytherapy on 06/09/20 10.      The patient underwent mammogram on 05/17/2011. She was found to have a seroma in the right breast thought to be probably benign and she was to have bilateral yearly mammograms and ultrasound was suggested in  6 months but she has followup with Dr. Reid. He had done drainage of the right  breast. The accession # is [id number] at [facility].   So the right breast fine needle aspiration basically showed some inflammatory cells. No malignant cells identified and hence he thought it was just a seroma and not to worry about.    Mammogram 12/14/2011 shows seroma in the right breast, probably benign, however, followup mammogram suggested in  6 months and followup ultrasound of the right breast is suggested in six months.      Mammogram done in June 2012 showed that she had seroma on the right breast, which was 21 mm with calcifications and postsurgical scar in the anterior one-third region of the right breast. It was felt that this is consistent with the seroma, as it had decreased in size. A right breast ultrasound was also done and the ultrasound suggested an oval elongated seroma w ith thick margins, about 21 mm. No solid or suspicious lesions were seen, but a 6-month followup was suggested.      Mammogram on 12/13/2012 was negative.    Patient had mammogram on 12/16/2013 and it was negative.    Her mammogram done 12/29/201 4 is negative.    Mammogram January 2018 negative      February 5, 2019: Left breast biopsy at 12:30 position fine-needle aspiration no malignant cells seen.  Cytology features consistent with benign fibrocystic changes.      Current Outpatient Medications on File Prior to Visit   Medication Sig Dispense Refill   • Aflibercept 2 MG/0.05ML solution      • Ascorbic Acid (VITAMIN C) 500 MG capsule Take 1 capsule by mouth.     • aspirin 81 MG oral suspension      • atorvastatin (LIPITOR) 10 MG tablet Take 1 tablet by mouth Daily. 90 tablet 3   • benazepril-hydrochlorthiazide (LOTENSIN HCT) 20-12.5 MG per tablet Take 1 tablet by mouth Daily. 90 tablet 3   • Calcium 500-125 MG-UNIT tablet Take 1 tablet by mouth.     • cetirizine (zyrTEC) 10 MG tablet      • Cholecalciferol (VITAMIN D) 1000 UNITS tablet Take 1,000 Units by mouth.     • Denosumab (PROLIA SC) Inject  under the skin Every 6  (Six) Months.     • doxycycline (VIBRAMYCIN) 100 MG capsule Take 100 mg by mouth Daily.     • Faricimab-svoa (Vabysmo) 6 MG/0.05ML solution      • levothyroxine (Synthroid) 50 MCG tablet Take 1 tablet by mouth Daily. 90 tablet 3   • metroNIDAZOLE (METROCREAM) 0.75 % cream Apply  topically to the appropriate area as directed 2 (Two) Times a Day.     • Multiple Vitamins-Minerals (MULTIVITAMIN ADULT PO) Take  by mouth.     • Multiple Vitamins-Minerals (PRESERVISION AREDS 2 PO) Take  by mouth 2 (Two) Times a Day.     • sulfacetamide sodium-sulfur (AVAR,PLEXION) 10-5 % topical emulsion        No current facility-administered medications on file prior to visit.       ALLERGIES:     Allergies   Allergen Reactions   • Penicillins Hives and Swelling   • Morphine Nausea And Vomiting     SOCIAL HISTORY: She works as a . She does not smoke. She does not drink alcohol. She is  and lives with her .     FAMILY HISTORY: Father  at 57 with a blood clot. Mother  at 72 with a st roke. She has one brother who had melanoma as well as non-Hodgkin lymphoma at age 41; currently he is 65 years old and in good health. She had a sister with breast cancer at age 50. She is followed by our office. She is 57 now and in good health. Hakeem edwards's sister who had breast cancer many years ago now developed metastatic breast cancer, followed by Dr. Gold in our group.         Review of Systems   Constitutional: Negative for appetite change, chills, diaphoresis, fatigue, fever and unexpected weight change.   HENT: Negative for hearing loss, sore throat and trouble swallowing.    Respiratory: Negative for cough, chest tightness, shortness of breath and wheezing.    Cardiovascular: Negative for chest pain, palpitations and leg swelling.   Gastrointestinal: Negative for abdominal distention, abdominal pain, constipation, diarrhea, nausea and vomiting.   Genitourinary: Negative for dysuria, frequency, hematuria and  "urgency.   Musculoskeletal: Negative for joint swelling.        No muscle weakness.   Skin: Negative for rash and wound.   Neurological: Negative for seizures, syncope, speech difficulty, weakness, numbness and headaches.   Hematological: Negative for adenopathy. Does not bruise/bleed easily.   Psychiatric/Behavioral: Negative for behavioral problems, confusion and suicidal ideas.   All other systems reviewed and are negative.    I have reviewed and confirmed the accuracy of the ROS as documented by the MA/LPN/RN Ines Zavala MD        Objective      Vitals:    11/11/22 1026 11/11/22 1031   BP: 155/92  Comment: right arm 130/92  Comment: left arm manual   Pulse: 75    Resp: 18    Temp: 97.5 °F (36.4 °C)    TempSrc: Temporal    SpO2: 98%    Weight: 80.2 kg (176 lb 12.8 oz)    Height: 162.2 cm (63.86\")    PainSc: 0-No pain      Current Status 11/11/2022   ECOG score 0       Physical examination    This patient's ACP documentation is up to date, and there's nothing further left to document.      CONSTITUTIONAL:  Vital signs reviewed.  No distress, looks comfortable.  EYES:  Conjunctivae and lids unremarkable.  PERRLA  EARS,NOSE,MOUTH,THROAT:  Ears and nose appear unremarkable.  Lips, teeth, gums appear unremarkable.  RESPIRATORY:  Normal respiratory effort.  Lungs clear to auscultation bilaterally.  BREAST: Right breast: No skin changes, no evidence of breast mass, no nipple discharge, no evidence of any right axillary adenopathy or right supraclavicular adenopathy  Left breast: No evidence of any skin changes, no evidence of any left breast mass and no evidence of left nipple discharge as well as no left axillary adenopathy or left supraclavicular adenopathy.  CARDIOVASCULAR:  Normal S1, S2.  No murmurs rubs or gallops.  No significant lower extremity edema.  GASTROINTESTINAL: Abdomen appears unremarkable.  Nontender.  No hepatomegaly.  No splenomegaly.  LYMPHATIC:  No cervical, supraclavicular, axillary " lymphadenopathy.  SKIN:  Warm.  No rashes.  PSYCHIATRIC:  Normal judgment and insight.  Normal mood and affect.    I have reexamined the patient and the results are consistent with the previously documented exam. Ines Zavala MD     RECENT LABS:  Hematology WBC   Date Value Ref Range Status   11/11/2022 6.90 3.40 - 10.80 10*3/mm3 Final   08/10/2020 8.57 3.40 - 10.80 10*3/mm3 Final     RBC   Date Value Ref Range Status   11/11/2022 4.59 3.77 - 5.28 10*6/mm3 Final   08/10/2020 4.88 3.77 - 5.28 10*6/mm3 Final     Hemoglobin   Date Value Ref Range Status   11/11/2022 13.8 12.0 - 15.9 g/dL Final     Hematocrit   Date Value Ref Range Status   11/11/2022 41.8 34.0 - 46.6 % Final     Platelets   Date Value Ref Range Status   11/11/2022 326 140 - 450 10*3/mm3 Final        Assessment & Plan     1. This is a patient with history of ductal carcinoma in situ; she completed tamoxifen 5 years. She is here for followup. S he is on a yearly mammogram. She has a family history of breast cancer and she prefers to come here once a year; hence, we will keep her once a year over here.  Patient is 8 years out and followed up yearly.  · October 6, 2020 complaints of small nodule on the medial aspect of left breast.  · Will obtain left diagnostic mammogram and ultrasound  · Reviewed left diagnostic mammogram, 17 x 7 x 16 mm lesion likely lipoma.  · Patient had undergone left diagnostic mammogram November 10, 2020 which basically showed that the palpable area of concern in the medial left breast posteriorly there was no mammographic finding.  Left breast ultrasound showed a oval hyperechoic mass with thin margins 17 x 7 x 19 mm at 9 o'clock position in the left breast and consistent with a lipoma.  Patient was thought to have a benign mammogram  · Seen by Dr. Reid November 18 2020 and he did not feel that there was any treatment needed for the lipoma.  · Reviewed mammogram from January 2021 and it is normal  · Patient prefers to continue  follow-up once a year as she has strong family history of malignancy  · Last mammogram 2022 was negative    2.  Family history of breast cancer, her sister has metastatic breast cancer with bone metastases and a brother who has history of non-Hodgkin's lymphoma 25 years ago and then developed Hodgkin's lymphoma.  · Breast ca at 50 with mets   · Another sister with breast ca at 74  · Patient's 1 sister  recently from metastatic breast cancer after 19 years, was followed by Dr. Danielson's    PLAN    · Patient states she had genetic testing done, do not see it in the computer and we will check results and see if Aliyah can find  · Strong family history of breast cancer and hence wants to be followed here once a year  · Reviewed mammogram from 2022 which is negative  · We will schedule mammogram for 2023  · Follow-up with me in 1 year with labs    Addendum: Genetic test was done and was negative for 94 genes.  This was done in 2020  Ines Zavala MD

## 2022-11-22 ENCOUNTER — TELEPHONE (OUTPATIENT)
Dept: FAMILY MEDICINE CLINIC | Facility: CLINIC | Age: 77
End: 2022-11-22

## 2022-11-22 NOTE — TELEPHONE ENCOUNTER
Called an informed pt to continue with OTC symptom mgmt and if nothing resolves, urgent care d/t limited providers in office. Pt verbalized understanding.

## 2022-11-22 NOTE — TELEPHONE ENCOUNTER
Caller: Susanne Varma    Relationship: Self    Best call back number: [phone]     What medication are you requesting: OVER THE COUNTER     What are your current symptoms: SINUS DRAINAGE (BACK OF THROAT) COUGH     How long have you been experiencing symptoms:2 DAYS     Have you had these symptoms before:    [] Yes  [x] No    Have you been treated for these symptoms before:   [] Yes  [x] No    If a prescription is needed, what is your preferred pharmacy and phone number:      Additional notes:        SUSANNE MERINO SAYS SHE IS ON BLOOD PRESSURE MEDICATION

## 2022-12-01 ENCOUNTER — TELEPHONE (OUTPATIENT)
Dept: ORTHOPEDIC SURGERY | Facility: CLINIC | Age: 77
End: 2022-12-01

## 2023-03-07 ENCOUNTER — APPOINTMENT (OUTPATIENT)
Dept: WOMENS IMAGING | Facility: HOSPITAL | Age: 78
End: 2023-03-07
Payer: MEDICARE

## 2023-03-07 PROCEDURE — 77063 BREAST TOMOSYNTHESIS BI: CPT | Performed by: RADIOLOGY

## 2023-03-07 PROCEDURE — 77067 SCR MAMMO BI INCL CAD: CPT | Performed by: RADIOLOGY

## 2023-09-14 DIAGNOSIS — I10 ESSENTIAL HYPERTENSION: ICD-10-CM

## 2023-09-14 DIAGNOSIS — E04.2 NON-TOXIC MULTINODULAR GOITER: ICD-10-CM

## 2023-09-14 DIAGNOSIS — E89.0 POSTOPERATIVE HYPOTHYROIDISM: ICD-10-CM

## 2023-09-14 DIAGNOSIS — E78.2 MIXED HYPERLIPIDEMIA: ICD-10-CM

## 2023-09-14 RX ORDER — BENAZEPRIL HYDROCHLORIDE AND HYDROCHLOROTHIAZIDE 20; 12.5 MG/1; MG/1
TABLET ORAL
Qty: 90 TABLET | Refills: 3 | Status: SHIPPED | OUTPATIENT
Start: 2023-09-14

## 2023-09-14 RX ORDER — ATORVASTATIN CALCIUM 10 MG/1
TABLET, FILM COATED ORAL
Qty: 90 TABLET | Refills: 3 | Status: SHIPPED | OUTPATIENT
Start: 2023-09-14

## 2023-09-14 RX ORDER — LEVOTHYROXINE SODIUM 50 MCG
TABLET ORAL
Qty: 90 TABLET | Refills: 3 | Status: SHIPPED | OUTPATIENT
Start: 2023-09-14

## 2023-09-26 ENCOUNTER — OFFICE VISIT (OUTPATIENT)
Dept: FAMILY MEDICINE CLINIC | Facility: CLINIC | Age: 78
End: 2023-09-26
Payer: MEDICARE

## 2023-09-26 VITALS
BODY MASS INDEX: 32.07 KG/M2 | SYSTOLIC BLOOD PRESSURE: 132 MMHG | WEIGHT: 181 LBS | HEIGHT: 63 IN | HEART RATE: 76 BPM | DIASTOLIC BLOOD PRESSURE: 98 MMHG | OXYGEN SATURATION: 98 % | RESPIRATION RATE: 18 BRPM

## 2023-09-26 DIAGNOSIS — E78.2 MIXED HYPERLIPIDEMIA: ICD-10-CM

## 2023-09-26 DIAGNOSIS — E55.9 VITAMIN D DEFICIENCY: ICD-10-CM

## 2023-09-26 DIAGNOSIS — R73.02 IMPAIRED GLUCOSE TOLERANCE: ICD-10-CM

## 2023-09-26 DIAGNOSIS — I10 ESSENTIAL HYPERTENSION: ICD-10-CM

## 2023-09-26 DIAGNOSIS — E66.09 CLASS 1 OBESITY DUE TO EXCESS CALORIES WITHOUT SERIOUS COMORBIDITY WITH BODY MASS INDEX (BMI) OF 32.0 TO 32.9 IN ADULT: ICD-10-CM

## 2023-09-26 DIAGNOSIS — Z00.00 ROUTINE HEALTH MAINTENANCE: Primary | ICD-10-CM

## 2023-09-26 DIAGNOSIS — Z23 NEED FOR VACCINATION: ICD-10-CM

## 2023-09-26 PROBLEM — E66.811 CLASS 1 OBESITY DUE TO EXCESS CALORIES WITHOUT SERIOUS COMORBIDITY WITH BODY MASS INDEX (BMI) OF 32.0 TO 32.9 IN ADULT: Status: ACTIVE | Noted: 2023-09-26

## 2023-09-26 PROCEDURE — 1170F FXNL STATUS ASSESSED: CPT | Performed by: FAMILY MEDICINE

## 2023-09-26 PROCEDURE — G0439 PPPS, SUBSEQ VISIT: HCPCS | Performed by: FAMILY MEDICINE

## 2023-09-26 PROCEDURE — 1159F MED LIST DOCD IN RCRD: CPT | Performed by: FAMILY MEDICINE

## 2023-09-26 PROCEDURE — 3075F SYST BP GE 130 - 139MM HG: CPT | Performed by: FAMILY MEDICINE

## 2023-09-26 PROCEDURE — 3080F DIAST BP >= 90 MM HG: CPT | Performed by: FAMILY MEDICINE

## 2023-09-26 PROCEDURE — 1160F RVW MEDS BY RX/DR IN RCRD: CPT | Performed by: FAMILY MEDICINE

## 2023-09-26 NOTE — PROGRESS NOTES
The ABCs of the Annual Wellness Visit  Subsequent Medicare Wellness Visit    Subjective      Sagrario Varma is a 77 y.o. female who presents for a Subsequent Medicare Wellness Visit.    Doing well overall today. Due for some routine blood work. Has a history of hyperglycemia and vitamin D deficiency, needs follow-up on both of those. Otherwise doing well with her current regimen. No refills are needed at this time.    The following portions of the patient's history were reviewed and   updated as appropriate: allergies, current medications, past family history, past medical history, past social history, past surgical history, and problem list.    Compared to one year ago, the patient feels her physical   health is better.    Compared to one year ago, the patient feels her mental   health is the same.    Recent Hospitalizations:  She was not admitted to the hospital during the last year.       Current Medical Providers:  Patient Care Team:  Sumeet Delgado MD as PCP - General (Family Medicine)  Ines Zavala MD as Consulting Physician (Hematology and Oncology)  Simba Reid MD as Referring Physician (General Surgery)    Outpatient Medications Prior to Visit   Medication Sig Dispense Refill    Aflibercept 2 MG/0.05ML solution       Ascorbic Acid (VITAMIN C) 500 MG capsule Take 1 capsule by mouth.      aspirin 81 MG oral suspension       atorvastatin (LIPITOR) 10 MG tablet TAKE 1 TABLET DAILY 90 tablet 3    benazepril-hydrochlorthiazide (LOTENSIN HCT) 20-12.5 MG per tablet TAKE 1 TABLET DAILY 90 tablet 3    Calcium 500-125 MG-UNIT tablet Take 1 tablet by mouth.      cetirizine (zyrTEC) 10 MG tablet       Cholecalciferol (VITAMIN D) 1000 UNITS tablet Take 1,000 Units by mouth.      Denosumab (PROLIA SC) Inject  under the skin Every 6 (Six) Months.      Faricimab-svoa (Vabysmo) 6 MG/0.05ML solution       Multiple Vitamins-Minerals (MULTIVITAMIN ADULT PO) Take  by mouth.      sulfacetamide sodium-sulfur  (AVAR,PLEXION) 10-5 % topical emulsion       Synthroid 50 MCG tablet TAKE 1 TABLET DAILY 90 tablet 3    doxycycline (VIBRAMYCIN) 100 MG capsule Take 100 mg by mouth Daily.      metroNIDAZOLE (METROCREAM) 0.75 % cream Apply  topically to the appropriate area as directed 2 (Two) Times a Day.      Multiple Vitamins-Minerals (PRESERVISION AREDS 2 PO) Take  by mouth 2 (Two) Times a Day.       No facility-administered medications prior to visit.       No opioid medication identified on active medication list. I have reviewed chart for other potential  high risk medication/s and harmful drug interactions in the elderly.        Aspirin is on active medication list. Aspirin use is indicated based on review of current medical condition/s. Pros and cons of this therapy have been discussed today. Benefits of this medication outweigh potential harm.  Patient has been encouraged to continue taking this medication.  .      Patient Active Problem List   Diagnosis    Generalized osteoarthritis    Mixed hyperlipidemia    Essential hypertension    Non-toxic multinodular goiter    Impaired glucose tolerance    Vitamin D deficiency    Ductal carcinoma in situ (DCIS) of breast    Family history of non-Hodgkin's lymphoma    Family history of Hodgkin's lymphoma    Family history of breast cancer    Adhesive capsulitis of left shoulder    Macular degeneration of both eyes    Postoperative hypothyroidism    Age related osteoporosis    Screening mammogram, encounter for     Mass of axillary tail of left breast     Class 1 obesity due to excess calories without serious comorbidity with body mass index (BMI) of 32.0 to 32.9 in adult     Advance Care Planning   Advance Care Planning     Advance Directive is not on file.  ACP discussion was held with the patient during this visit. Patient does not have an advance directive, information provided.     Objective    Vitals:    09/26/23 1052   BP: 132/98   Pulse: 76   Resp: 18   SpO2: 98%   Weight:  "82.1 kg (181 lb)   Height: 160 cm (63\")     Estimated body mass index is 32.06 kg/m² as calculated from the following:    Height as of this encounter: 160 cm (63\").    Weight as of this encounter: 82.1 kg (181 lb).           Does the patient have evidence of cognitive impairment?   No            HEALTH RISK ASSESSMENT    Smoking Status:  Social History     Tobacco Use   Smoking Status Never   Smokeless Tobacco Never     Alcohol Consumption:  Social History     Substance and Sexual Activity   Alcohol Use No     Fall Risk Screen:    STEADI Fall Risk Assessment was completed, and patient is at LOW risk for falls.Assessment completed on:2023    Depression Screenin/26/2023    10:53 AM   PHQ-2/PHQ-9 Depression Screening   Little Interest or Pleasure in Doing Things 0-->not at all   Feeling Down, Depressed or Hopeless 0-->not at all   PHQ-9: Brief Depression Severity Measure Score 0       Health Habits and Functional and Cognitive Screenin/26/2023    10:53 AM   Functional & Cognitive Status   Do you have difficulty preparing food and eating? No   Do you have difficulty bathing yourself, getting dressed or grooming yourself? No   Do you have difficulty using the toilet? No   Do you have difficulty moving around from place to place? No   Do you have trouble with steps or getting out of a bed or a chair? No   Current Diet Well Balanced Diet   Dental Exam Up to date   Eye Exam Up to date   Exercise (times per week) 3 times per week   Current Exercises Include Walking   Do you need help using the phone?  No   Are you deaf or do you have serious difficulty hearing?  No   Do you need help to go to places out of walking distance? No   Do you need help shopping? No   Do you need help preparing meals?  No   Do you need help with housework?  No   Do you need help with laundry? No   Do you need help taking your medications? No   Do you need help managing money? No   Do you ever drive or ride in a car without " wearing a seat belt? No   Have you felt unusual stress, anger or loneliness in the last month? No   Who do you live with? Spouse   If you need help, do you have trouble finding someone available to you? No   Do you have difficulty concentrating, remembering or making decisions? No       Age-appropriate Screening Schedule:  Refer to the list below for future screening recommendations based on patient's age, sex and/or medical conditions. Orders for these recommended tests are listed in the plan section. The patient has been provided with a written plan.    Health Maintenance   Topic Date Due    HEMOGLOBIN A1C  03/14/2023    LIPID PANEL  09/14/2023    BMI FOLLOWUP  09/14/2023    INFLUENZA VACCINE  10/01/2023    MAMMOGRAM  03/07/2024    DIABETIC EYE EXAM  08/07/2024    DXA SCAN  08/16/2024    ANNUAL WELLNESS VISIT  09/26/2024    TDAP/TD VACCINES (3 - Td or Tdap) 01/04/2028    COLORECTAL CANCER SCREENING  02/01/2032    COVID-19 Vaccine  Completed    Pneumococcal Vaccine 65+  Completed    ZOSTER VACCINE  Completed    HEPATITIS C SCREENING  Addressed    URINE MICROALBUMIN  Discontinued                  CMS Preventative Services Quick Reference  Risk Factors Identified During Encounter:    Immunizations Discussed/Encouraged: Influenza and COVID19  Inactivity/Sedentary: Patient was advised to exercise at least 150 minutes a week per CDC recommendations.    The above risks/problems have been discussed with the patient.  Pertinent information has been shared with the patient in the After Visit Summary.    Diagnoses and all orders for this visit:    1. Routine health maintenance (Primary)    2. Essential hypertension  -     Comprehensive Metabolic Panel    3. Mixed hyperlipidemia  -     Lipid Panel    4. Impaired glucose tolerance  -     Hemoglobin A1c  -     Comprehensive Metabolic Panel    5. Vitamin D deficiency  -     Vitamin D,25-Hydroxy    6. Class 1 obesity due to excess calories without serious comorbidity with body  mass index (BMI) of 32.0 to 32.9 in adult    7. Need for vaccination    Orders as above. I will contact her with lab results as available. Encouraged to get COVID and flu vaccines in the near future.    Encouraged to continue working on healthy lifestyle habits. Recommended follow-up as below. Encouraged communication via [employer] in the meantime.    Follow Up:   Next Medicare Wellness visit to be scheduled in 1 year.      An After Visit Summary and PPPS were made available to the patient.    Prevention counseling performed as below: Mindfulness for stress management.

## 2023-09-27 LAB
25(OH)D3+25(OH)D2 SERPL-MCNC: 46.6 NG/ML (ref 30–100)
ALBUMIN SERPL-MCNC: 4.6 G/DL (ref 3.5–5.2)
ALBUMIN/GLOB SERPL: 1.7 G/DL
ALP SERPL-CCNC: 117 U/L (ref 39–117)
ALT SERPL-CCNC: 21 U/L (ref 1–33)
AST SERPL-CCNC: 24 U/L (ref 1–32)
BILIRUB SERPL-MCNC: 0.4 MG/DL (ref 0–1.2)
BUN SERPL-MCNC: 12 MG/DL (ref 8–23)
BUN/CREAT SERPL: 19 (ref 7–25)
CALCIUM SERPL-MCNC: 10.4 MG/DL (ref 8.6–10.5)
CHLORIDE SERPL-SCNC: 100 MMOL/L (ref 98–107)
CHOLEST SERPL-MCNC: 159 MG/DL (ref 0–200)
CO2 SERPL-SCNC: 29.8 MMOL/L (ref 22–29)
CREAT SERPL-MCNC: 0.63 MG/DL (ref 0.57–1)
EGFRCR SERPLBLD CKD-EPI 2021: 92.1 ML/MIN/1.73
GLOBULIN SER CALC-MCNC: 2.7 GM/DL
GLUCOSE SERPL-MCNC: 86 MG/DL (ref 65–99)
HBA1C MFR BLD: 5.7 % (ref 4.8–5.6)
HDLC SERPL-MCNC: 46 MG/DL (ref 40–60)
LDLC SERPL CALC-MCNC: 94 MG/DL (ref 0–100)
POTASSIUM SERPL-SCNC: 4.9 MMOL/L (ref 3.5–5.2)
PROT SERPL-MCNC: 7.3 G/DL (ref 6–8.5)
SODIUM SERPL-SCNC: 140 MMOL/L (ref 136–145)
TRIGL SERPL-MCNC: 102 MG/DL (ref 0–150)
VLDLC SERPL CALC-MCNC: 19 MG/DL (ref 5–40)

## 2023-11-07 ENCOUNTER — TELEPHONE (OUTPATIENT)
Dept: ONCOLOGY | Facility: CLINIC | Age: 78
End: 2023-11-07
Payer: MEDICARE

## 2023-11-07 NOTE — TELEPHONE ENCOUNTER
"Caller: Sagrario Varma \"Nilam\"    Relationship to patient: Self    Best call back number: [phone]    Chief complaint: PT CALLED TO RESCHEDULE    Type of visit: LAB AND FOLLOW UP    Requested date: NEEDS EARLY MORNING      If rescheduling, when is the original appointment: 12-26-23     "

## 2023-11-08 NOTE — TELEPHONE ENCOUNTER
Returned patients call and LM for pt to call back.Advised pt that we do keep early morning appts for patients receiving chemo early morning. Requested for pt to call back to adjust her appt time.

## 2023-11-28 ENCOUNTER — LAB (OUTPATIENT)
Dept: LAB | Facility: HOSPITAL | Age: 78
End: 2023-11-28
Payer: MEDICARE

## 2023-11-28 ENCOUNTER — OFFICE VISIT (OUTPATIENT)
Dept: ONCOLOGY | Facility: CLINIC | Age: 78
End: 2023-11-28
Payer: MEDICARE

## 2023-11-28 VITALS
RESPIRATION RATE: 18 BRPM | WEIGHT: 184.9 LBS | SYSTOLIC BLOOD PRESSURE: 130 MMHG | BODY MASS INDEX: 32.76 KG/M2 | OXYGEN SATURATION: 99 % | DIASTOLIC BLOOD PRESSURE: 90 MMHG | HEART RATE: 78 BPM | TEMPERATURE: 98 F | HEIGHT: 63 IN

## 2023-11-28 DIAGNOSIS — D05.10 DUCTAL CARCINOMA IN SITU (DCIS) OF BREAST, UNSPECIFIED LATERALITY: Primary | ICD-10-CM

## 2023-11-28 DIAGNOSIS — Z80.3 FAMILY HISTORY OF BREAST CANCER: ICD-10-CM

## 2023-11-28 DIAGNOSIS — D05.10 DUCTAL CARCINOMA IN SITU (DCIS) OF BREAST, UNSPECIFIED LATERALITY: ICD-10-CM

## 2023-11-28 LAB
ALBUMIN SERPL-MCNC: 4.1 G/DL (ref 3.5–5.2)
ALBUMIN/GLOB SERPL: 1.4 G/DL
ALP SERPL-CCNC: 102 U/L (ref 39–117)
ALT SERPL W P-5'-P-CCNC: 16 U/L (ref 1–33)
ANION GAP SERPL CALCULATED.3IONS-SCNC: 8.8 MMOL/L (ref 5–15)
AST SERPL-CCNC: 25 U/L (ref 1–32)
BASOPHILS # BLD AUTO: 0.06 10*3/MM3 (ref 0–0.2)
BASOPHILS NFR BLD AUTO: 0.9 % (ref 0–1.5)
BILIRUB SERPL-MCNC: 0.3 MG/DL (ref 0–1.2)
BUN SERPL-MCNC: 13 MG/DL (ref 8–23)
BUN/CREAT SERPL: 19.4 (ref 7–25)
CALCIUM SPEC-SCNC: 9.3 MG/DL (ref 8.6–10.5)
CHLORIDE SERPL-SCNC: 102 MMOL/L (ref 98–107)
CO2 SERPL-SCNC: 29.2 MMOL/L (ref 22–29)
CREAT SERPL-MCNC: 0.67 MG/DL (ref 0.6–1.1)
DEPRECATED RDW RBC AUTO: 44 FL (ref 37–54)
EGFRCR SERPLBLD CKD-EPI 2021: 90.2 ML/MIN/1.73
EOSINOPHIL # BLD AUTO: 0.3 10*3/MM3 (ref 0–0.4)
EOSINOPHIL NFR BLD AUTO: 4.3 % (ref 0.3–6.2)
ERYTHROCYTE [DISTWIDTH] IN BLOOD BY AUTOMATED COUNT: 13.2 % (ref 12.3–15.4)
GLOBULIN UR ELPH-MCNC: 2.9 GM/DL
GLUCOSE SERPL-MCNC: 92 MG/DL (ref 65–99)
HCT VFR BLD AUTO: 41.4 % (ref 34–46.6)
HGB BLD-MCNC: 13.8 G/DL (ref 12–15.9)
IMM GRANULOCYTES # BLD AUTO: 0.02 10*3/MM3 (ref 0–0.05)
IMM GRANULOCYTES NFR BLD AUTO: 0.3 % (ref 0–0.5)
LYMPHOCYTES # BLD AUTO: 2.1 10*3/MM3 (ref 0.7–3.1)
LYMPHOCYTES NFR BLD AUTO: 30.1 % (ref 19.6–45.3)
MCH RBC QN AUTO: 30.3 PG (ref 26.6–33)
MCHC RBC AUTO-ENTMCNC: 33.3 G/DL (ref 31.5–35.7)
MCV RBC AUTO: 90.8 FL (ref 79–97)
MONOCYTES # BLD AUTO: 0.75 10*3/MM3 (ref 0.1–0.9)
MONOCYTES NFR BLD AUTO: 10.7 % (ref 5–12)
NEUTROPHILS NFR BLD AUTO: 3.75 10*3/MM3 (ref 1.7–7)
NEUTROPHILS NFR BLD AUTO: 53.7 % (ref 42.7–76)
NRBC BLD AUTO-RTO: 0 /100 WBC (ref 0–0.2)
PLATELET # BLD AUTO: 304 10*3/MM3 (ref 140–450)
PMV BLD AUTO: 9.5 FL (ref 6–12)
POTASSIUM SERPL-SCNC: 4.9 MMOL/L (ref 3.5–5.2)
PROT SERPL-MCNC: 7 G/DL (ref 6–8.5)
RBC # BLD AUTO: 4.56 10*6/MM3 (ref 3.77–5.28)
SODIUM SERPL-SCNC: 140 MMOL/L (ref 136–145)
WBC NRBC COR # BLD AUTO: 6.98 10*3/MM3 (ref 3.4–10.8)

## 2023-11-28 PROCEDURE — 3075F SYST BP GE 130 - 139MM HG: CPT | Performed by: INTERNAL MEDICINE

## 2023-11-28 PROCEDURE — 3080F DIAST BP >= 90 MM HG: CPT | Performed by: INTERNAL MEDICINE

## 2023-11-28 PROCEDURE — 80053 COMPREHEN METABOLIC PANEL: CPT

## 2023-11-28 PROCEDURE — 1126F AMNT PAIN NOTED NONE PRSNT: CPT | Performed by: INTERNAL MEDICINE

## 2023-11-28 PROCEDURE — 85025 COMPLETE CBC W/AUTO DIFF WBC: CPT

## 2023-11-28 PROCEDURE — 99213 OFFICE O/P EST LOW 20 MIN: CPT | Performed by: INTERNAL MEDICINE

## 2023-11-28 PROCEDURE — 36415 COLL VENOUS BLD VENIPUNCTURE: CPT

## 2023-11-28 RX ORDER — DOXYCYCLINE HYCLATE 100 MG/1
100 CAPSULE ORAL
COMMUNITY
Start: 2023-11-02

## 2023-11-28 NOTE — PROGRESS NOTES
Subjective .     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   Status post partial thyroidectomy.   Status post knee surgery.     HISTORY OF PRESENT ILLNESS:  The patient is a 78 y.o. year old female who is here for follow-up with the above-mentioned history.    History of Present Illness     The patient is a 67-year-old female with the above history, currently here for followup. She has completed 5 years of tamoxifen as of 2015.  She was initially diagnosed in .  She comes here yearly now.  She has a family history of sister having had breast cancer and is followed here by Dr. Moya.  She states that her sister was tested for genetics counseling and was negative.  Currently patient is asymptomatic.      Even though patient is 10 years out she prefers to follow-up here as she has got a strong family history of malignancy.  She is currently followed with observation.  Her mammogram was 2021 which is negative.    Interval history:  Patient has been followed here for quite some time since .  She does not want to be released from here.  She has 2 sisters both of them had breast cancer and one of them was following up with Dr. Moya in our group and  after 19 years with breast cancer.  Her other sister also has breast cancer.  She has patient has been tested and undergone genetic testing which has been negative.  I do not see the report of the genetic testing in the computer.  We will see if we can get it from [facility].    2023: Currently patient is comfortable without any complaints.  Reviewed mammogram from 2023 and it is negative    PAST MEDICAL HISTORY:   Hypertension.   High cholesterol.   Multinodular goiter of the thyroid gland. She did undergo ultrasound of the thyroid gland and found multinodular goiter. She is followed by Dr. Restrepo for that. She has had  a biopsy in the past which was negative.   Dilatation and curettage x2 for excessive bleeding.     OB/GYN HISTORY: She started menstrual period at age 12. She obtained menopause at age 56. She is  4, para 3, one stillborn. She has 3 children, two daughters and one son, 37, 35 and 28 year old with normal deliveries.     ONCOLOGIC HISTORY:      The patient was following up with yearly mammogram. She had her yearly mammogram done 03/30/ 20 10 by Dr. Napoles her primary care physician which showed calcifications in the right breast with additional evaluation and as a result she repeated the mammogram on 2010 which showed calcifications in the right breast which were suspicious in the anterior one-third of the right breast at the 12 o' clock position 2 cm away from the nipple.    Subsequently the patient underwent biopsy on 0 4/16/20 10 done at [facility], #[id number]. Path is consistent with ductal carcinoma in situ low to intermediate grade, solid and cribriform types, at least 5 mm in maximum dimension. There was no invasive carcinoma identified and there was some fibrocystic changes identified. Hormonal assays were performed, ER positive at 99%, CT positive at 99%, HER/2 was 1+ and Ki-67 was 6.      She subsequently was referred to Dr. Reid and MRI of the breast was also obtained. MRI of the breast 0 4/24/20 10 had shown post biopsy cavity within the right breast at the 12 o'clock position with an internal metallic clip but no suspicio us surrounding or residual enhancement seen in the right breast. There was no evidence of any axillary adenopathy. There were no suspicious findings on the left breast.      The patient then was admitted to [facility] at which time she underwent surgery by Dr. Reid 0 5/19/20 10. Pathology #[id number]. She underwent right breast lumpectomy with needle localization. The right breast lumpectomy showed breast tissue with single focus of atypical ductal  hyperplasia adjacent to the biopsy site. Res idual carcinoma was not identified. There were some fibrocystic changes including ductal hyperplasia without atypia. The additional superior margin on the right breast showed duct hyperplasia without atypia and the deep margin also showed some duct hype rplasia without atypia.      As a result the patient was then referred to Dr. Sumeet Reddy for evaluation of brachytherapy. The patient then received brachytherapy with 10 total fractions given twice daily over a 5- treatment- day period and was prescribed 350 cGy per treatment fraction. The total dose she received was 3400 cGy in 10 fractions. Currently she is healed from surgery and is here for further recommendations.      Tamoxifen chemoprophylaxis started on 06/22/2010 to complete a total of 5 years.    Mammogram from 12/10/20 11 showed a new oval mass of 4.5 cm. She had a few round calcifications and postsurgical scar in the anterior one-third region of the right breast at the 12 o'clock positive. This area was thought to be consistent with seroma and pos tsurgical change, however, repeat mammogram was suggested in 6 months.        Dr. Reid aspirated 25 cc of clear fluid on 12/14/10 from the right side and the pathology, accession #[id number], was thought to have adipose stroma tissue, histiocytes and inflammat ory cells. No malignant cells were identified, consistent with fat necrosis. However, repeat mammogram and ultrasound are to be done because of the palpable mass, which is significantly large.      The patient underwent radiation 3400 cGy depth of 1 cm from the MammoSite balloon surface, given 10 fractions of 340 cGy each. Treatments were delivered for 5  treatment days, given twice daily. She completed her brachytherapy on 06/09/20 10.      The patient underwent mammogram on 05/17/2011. She was found to have a seroma in the right breast thought to be probably benign and she was to have bilateral yearly  mammograms and ultrasound was suggested in  6 months but she has followup with Dr. Reid. He had done drainage of the right breast. The accession # is [id number] at [facility].   So the right breast fine needle aspiration basically showed some inflammatory cells. No malignant cells identified and hence he thought it was just a seroma and not to worry about.    Mammogram 12/14/2011 shows seroma in the right breast, probably benign, however, followup mammogram suggested in  6 months and followup ultrasound of the right breast is suggested in six months.      Mammogram done in June 2012 showed that she had seroma on the right breast, which was 21 mm with calcifications and postsurgical scar in the anterior one-third region of the right breast. It was felt that this is consistent with the seroma, as it had decreased in size. A right breast ultrasound was also done and the ultrasound suggested an oval elongated seroma w ith thick margins, about 21 mm. No solid or suspicious lesions were seen, but a 6-month followup was suggested.      Mammogram on 12/13/2012 was negative.    Patient had mammogram on 12/16/2013 and it was negative.    Her mammogram done 12/29/201 4 is negative.    Mammogram January 2018 negative      February 5, 2019: Left breast biopsy at 12:30 position fine-needle aspiration no malignant cells seen.  Cytology features consistent with benign fibrocystic changes.      Current Outpatient Medications on File Prior to Visit   Medication Sig Dispense Refill    Aflibercept 2 MG/0.05ML solution       Ascorbic Acid (VITAMIN C) 500 MG capsule Take 1 capsule by mouth.      aspirin 81 MG oral suspension       atorvastatin (LIPITOR) 10 MG tablet TAKE 1 TABLET DAILY 90 tablet 3    benazepril-hydrochlorthiazide (LOTENSIN HCT) 20-12.5 MG per tablet TAKE 1 TABLET DAILY 90 tablet 3    Calcium 500-125 MG-UNIT tablet Take 1 tablet by mouth.      cetirizine (zyrTEC) 10 MG tablet       Cholecalciferol (VITAMIN D) 1000  UNITS tablet Take 1 tablet by mouth.      Denosumab (PROLIA SC) Inject  under the skin Every 6 (Six) Months.      doxycycline (VIBRAMYCIN) 100 MG capsule Take 1 capsule by mouth.      Faricimab-svoa (Vabysmo) 6 MG/0.05ML solution       Multiple Vitamins-Minerals (MULTIVITAMIN ADULT PO) Take  by mouth.      mupirocin (BACTROBAN) 2 % ointment Apply 1 application  topically to the appropriate area as directed.      sulfacetamide sodium-sulfur (AVAR,PLEXION) 10-5 % topical emulsion       Synthroid 50 MCG tablet TAKE 1 TABLET DAILY 90 tablet 3     No current facility-administered medications on file prior to visit.       ALLERGIES:     Allergies   Allergen Reactions    Penicillins Hives and Swelling    Morphine Nausea And Vomiting     SOCIAL HISTORY: She works as a . She does not smoke. She does not drink alcohol. She is  and lives with her .     FAMILY HISTORY: Father  at 57 with a blood clot. Mother  at 72 with a st roke. She has one brother who had melanoma as well as non-Hodgkin lymphoma at age 41; currently he is 65 years old and in good health. She had a sister with breast cancer at age 50. She is followed by our office. She is 57 now and in good health. Hakeem edwards's sister who had breast cancer many years ago now developed metastatic breast cancer, followed by Dr. Gold in our group.         Review of Systems   Constitutional:  Negative for appetite change, chills, diaphoresis, fatigue, fever and unexpected weight change.   HENT:  Negative for hearing loss, sore throat and trouble swallowing.    Respiratory:  Negative for cough, chest tightness, shortness of breath and wheezing.    Cardiovascular:  Negative for chest pain, palpitations and leg swelling.   Gastrointestinal:  Negative for abdominal distention, abdominal pain, constipation, diarrhea, nausea and vomiting.   Genitourinary:  Negative for dysuria, frequency, hematuria and urgency.   Musculoskeletal:  Negative for  "joint swelling.        No muscle weakness.   Skin:  Negative for rash and wound.   Neurological:  Negative for seizures, syncope, speech difficulty, weakness, numbness and headaches.   Hematological:  Negative for adenopathy. Does not bruise/bleed easily.   Psychiatric/Behavioral:  Negative for behavioral problems, confusion and suicidal ideas.    All other systems reviewed and are negative.    I have reviewed and confirmed the accuracy of the ROS as documented by the MA/LPN/RN Ines Zavala MD        Objective      Vitals:    11/28/23 1116   BP: 130/90   Pulse: 78   Resp: 18   Temp: 98 °F (36.7 °C)   TempSrc: Temporal   SpO2: 99%   Weight: 83.9 kg (184 lb 14.4 oz)   Height: 160 cm (62.99\")   PainSc: 0-No pain           11/28/2023    10:55 AM   Current Status   ECOG score 0       Physical examination    This patient's ACP documentation is up to date, and there's nothing further left to document.      CONSTITUTIONAL:  Vital signs reviewed.  No distress, looks comfortable.  EYES:  Conjunctivae and lids unremarkable.  PERRLA  EARS,NOSE,MOUTH,THROAT:  Ears and nose appear unremarkable.  Lips, teeth, gums appear unremarkable.  RESPIRATORY:  Normal respiratory effort.  Lungs clear to auscultation bilaterally.  BREAST: Right breast: No skin changes, no evidence of breast mass, no nipple discharge, no evidence of any right axillary adenopathy or right supraclavicular adenopathy  Left breast: No evidence of any skin changes, no evidence of any left breast mass and no evidence of left nipple discharge as well as no left axillary adenopathy or left supraclavicular adenopathy.  CARDIOVASCULAR:  Normal S1, S2.  No murmurs rubs or gallops.  No significant lower extremity edema.  GASTROINTESTINAL: Abdomen appears unremarkable.  Nontender.  No hepatomegaly.  No splenomegaly.  LYMPHATIC:  No cervical, supraclavicular, axillary lymphadenopathy.  SKIN:  Warm.  No rashes.  PSYCHIATRIC:  Normal judgment and insight.  Normal mood and " affect.    I have reexamined the patient and the results are consistent with the previously documented exam. Ines Zavala MD     RECENT LABS:  Hematology WBC   Date Value Ref Range Status   11/28/2023 6.98 3.40 - 10.80 10*3/mm3 Final   08/10/2020 8.57 3.40 - 10.80 10*3/mm3 Final     RBC   Date Value Ref Range Status   11/28/2023 4.56 3.77 - 5.28 10*6/mm3 Final   08/10/2020 4.88 3.77 - 5.28 10*6/mm3 Final     Hemoglobin   Date Value Ref Range Status   11/28/2023 13.8 12.0 - 15.9 g/dL Final     Hematocrit   Date Value Ref Range Status   11/28/2023 41.4 34.0 - 46.6 % Final     Platelets   Date Value Ref Range Status   11/28/2023 304 140 - 450 10*3/mm3 Final        Assessment & Plan     1. This is a patient with history of ductal carcinoma in situ; she completed tamoxifen 5 years. She is here for followup. S he is on a yearly mammogram. She has a family history of breast cancer and she prefers to come here once a year; hence, we will keep her once a year over here.  Patient is 8 years out and followed up yearly.  October 6, 2020 complaints of small nodule on the medial aspect of left breast.  Will obtain left diagnostic mammogram and ultrasound  Reviewed left diagnostic mammogram, 17 x 7 x 16 mm lesion likely lipoma.  Patient had undergone left diagnostic mammogram November 10, 2020 which basically showed that the palpable area of concern in the medial left breast posteriorly there was no mammographic finding.  Left breast ultrasound showed a oval hyperechoic mass with thin margins 17 x 7 x 19 mm at 9 o'clock position in the left breast and consistent with a lipoma.  Patient was thought to have a benign mammogram  Seen by Dr. Reid November 18 2020 and he did not feel that there was any treatment needed for the lipoma.  Reviewed mammogram from January 2021 and it is normal  Patient prefers to continue follow-up once a year as she has strong family history of malignancy  Last mammogram J March 2023 and it is  negative.    2.  Family history of breast cancer, her sister has metastatic breast cancer with bone metastases and a brother who has history of non-Hodgkin's lymphoma 25 years ago and then developed Hodgkin's lymphoma.  Breast ca at 50 with mets   Another sister with breast ca at 74  Patient's 1 sister  recently from metastatic breast cancer after 19 years, was followed by Dr. Danielson's  Genetic test done which was negative for 94 genes    PLAN  Currently patient is followed with observation  No evidence of recurrence  Reviewed mammogram from 2023 and is negative  Follow-up with us in 6 months with labs at which time she will be scheduled for mammogram screening    Ines Zavala MD

## 2024-03-08 ENCOUNTER — APPOINTMENT (OUTPATIENT)
Dept: WOMENS IMAGING | Facility: HOSPITAL | Age: 79
End: 2024-03-08
Payer: MEDICARE

## 2024-03-08 PROCEDURE — 77063 BREAST TOMOSYNTHESIS BI: CPT | Performed by: RADIOLOGY

## 2024-03-08 PROCEDURE — 77067 SCR MAMMO BI INCL CAD: CPT | Performed by: RADIOLOGY

## 2024-03-09 DIAGNOSIS — I65.23 BILATERAL CAROTID ARTERY OCCLUSION: Primary | ICD-10-CM

## 2024-03-25 ENCOUNTER — OFFICE VISIT (OUTPATIENT)
Dept: ORTHOPEDIC SURGERY | Facility: CLINIC | Age: 79
End: 2024-03-25
Payer: MEDICARE

## 2024-03-25 VITALS — HEIGHT: 64 IN | TEMPERATURE: 98.6 F | WEIGHT: 179.3 LBS | BODY MASS INDEX: 30.61 KG/M2

## 2024-03-25 DIAGNOSIS — M19.019 ARTHRITIS OF SHOULDER: Primary | ICD-10-CM

## 2024-03-25 PROCEDURE — 1160F RVW MEDS BY RX/DR IN RCRD: CPT | Performed by: ORTHOPAEDIC SURGERY

## 2024-03-25 PROCEDURE — 1159F MED LIST DOCD IN RCRD: CPT | Performed by: ORTHOPAEDIC SURGERY

## 2024-03-25 PROCEDURE — 99214 OFFICE O/P EST MOD 30 MIN: CPT | Performed by: ORTHOPAEDIC SURGERY

## 2024-03-25 PROCEDURE — 73030 X-RAY EXAM OF SHOULDER: CPT | Performed by: ORTHOPAEDIC SURGERY

## 2024-03-25 RX ORDER — PREGABALIN 150 MG/1
150 CAPSULE ORAL ONCE
OUTPATIENT
Start: 2024-03-25 | End: 2024-03-25

## 2024-03-25 RX ORDER — ACETAMINOPHEN 325 MG/1
1000 TABLET ORAL ONCE
OUTPATIENT
Start: 2024-03-25 | End: 2024-03-25

## 2024-03-25 RX ORDER — MELOXICAM 7.5 MG/1
15 TABLET ORAL ONCE
OUTPATIENT
Start: 2024-03-25 | End: 2024-03-25

## 2024-03-25 NOTE — PROGRESS NOTES
Patient: Sagrario Varma    YOB: 1945    Medical Record Number: [id number]    Chief Complaints: Follow-up regarding left shoulder pain, new complaint right shoulder pain    History of Present Illness:     78 y.o. female patient who presents for follow-up of her left shoulder.  I last saw her for this issue a couple of years ago.  She has had extensive conservative treatment over the years including activity modifications, therapy and multiple injections.  All of that failed.  She was scheduled for a replacement several years ago.  She ended up canceling that surgery due to multiple issues.  Over the past couple years, she says that her pain and dysfunction have steadily gotten worse.  Current pain is severe and constant.  She reports grinding whenever she tries to reach or lift.  She has very limited movement and significant dysfunction.  It is affecting her quality of life.  She has trouble with just routine daily activities, particularly anything that involves reaching or lifting to or above shoulder level.    She also has a new complaint of right shoulder pain.  She feels like she is probably overusing the right shoulder.  She has noticed some swelling in the right shoulder.  Pain is mild to moderate and aching.  Of the 2 shoulders, she says the left is by far the worst.    Allergies:   Allergies   Allergen Reactions    Penicillins Hives and Swelling    Morphine Nausea And Vomiting       Home Medications:    Current Outpatient Medications:     Aflibercept 2 MG/0.05ML solution, , Disp: , Rfl:     Ascorbic Acid (VITAMIN C) 500 MG capsule, Take 1 capsule by mouth., Disp: , Rfl:     aspirin 81 MG oral suspension, , Disp: , Rfl:     atorvastatin (LIPITOR) 10 MG tablet, TAKE 1 TABLET DAILY, Disp: 90 tablet, Rfl: 3    benazepril-hydrochlorthiazide (LOTENSIN HCT) 20-12.5 MG per tablet, TAKE 1 TABLET DAILY, Disp: 90 tablet, Rfl: 3    Calcium 500-125 MG-UNIT tablet, Take 1 tablet by mouth., Disp: , Rfl:      cetirizine (zyrTEC) 10 MG tablet, , Disp: , Rfl:     Cholecalciferol (VITAMIN D) 1000 UNITS tablet, Take 1 tablet by mouth., Disp: , Rfl:     Denosumab (PROLIA SC), Inject  under the skin Every 6 (Six) Months., Disp: , Rfl:     doxycycline (VIBRAMYCIN) 100 MG capsule, Take 1 capsule by mouth., Disp: , Rfl:     Multiple Vitamins-Minerals (MULTIVITAMIN ADULT PO), Take  by mouth., Disp: , Rfl:     mupirocin (BACTROBAN) 2 % ointment, Apply 1 Application topically to the appropriate area as directed., Disp: , Rfl:     sulfacetamide sodium-sulfur (AVAR,PLEXION) 10-5 % topical emulsion, , Disp: , Rfl:     Synthroid 50 MCG tablet, TAKE 1 TABLET DAILY, Disp: 90 tablet, Rfl: 3    Past Medical History:   Diagnosis Date    Age related osteoporosis 03/23/2021    Arthritis of back Years ago    Cancer     Cataract Recent    Cervical disc disorder Jul 2010    Narrowing of C5 & C7 noted    Class 1 obesity due to excess calories without serious comorbidity with body mass index (BMI) of 32.0 to 32.9 in adult 9/26/2023    Colon polyp     DCIS (ductal carcinoma in situ)     right    Fracture of wrist 1951    Left wrist    Fracture, clavicle Don't recall    Was told I had one    Fracture, humerus 1951    Left arm    Fracture, ulna 1951    Left arm    Fractures 1951    Left arm, 3 places    HBP (high blood pressure)     hypertension    Hypercholesterolemia     Multinodular goiter     of thyroid gland    Osteoarthritis     Periarthritis of shoulder     Saw Dr. Galeas for this previously.    Postoperative hypothyroidism 01/28/2020    Rotator cuff syndrome Years ago    Thyroid nodule 12/23/2013    Visual impairment 3/2019    Macular degeneration       Past Surgical History:   Procedure Laterality Date    APPENDECTOMY  01/1961    BREAST LUMPECTOMY Right 05/2010    COLONOSCOPY  Feb 2022    No problems found    DILATATION AND CURETTAGE      x2 for excessive bleeding    JOINT REPLACEMENT  Apr 2014    Right TKR    KNEE SURGERY      OVARIAN  CYST SURGERY  01/1961    THYROIDECTOMY      TRIGGER POINT INJECTION  Mar 2019    5 in left shoulder    WRIST SURGERY  1951       Social History     Occupational History    Occupation: [employer]     Employer: RETIRED   Tobacco Use    Smoking status: Never    Smokeless tobacco: Never   Vaping Use    Vaping status: Never Used   Substance and Sexual Activity    Alcohol use: No    Drug use: No    Sexual activity: Yes     Partners: Male     Birth control/protection: None      Social History     Social History Narrative    Retired, former retail and        Family History   Problem Relation Age of Onset    Hyperlipidemia Other     Hypertension Other     Stroke Other     Kidney failure Other     Diabetes Other     Heart disease Other     Melanoma Other     Stroke Mother     Hyperlipidemia Mother     Kidney disease Mother     Vision loss Mother     Diabetes Mother     Thrombosis Father     Hyperlipidemia Father     Breast cancer Sister 50        Metastatic    Cancer Sister         Metastatic breast cancer    Diabetes Sister         Likely due to cancer treatment    Melanoma Brother 41    Hodgkin's lymphoma Brother 41        non-Hodgkin lymphoma    Cancer Brother         NHL, Lymphoma, Leukemia    Hyperlipidemia Brother     Cancer Paternal Aunt     Cancer Sister         Breast cancer    Hyperlipidemia Sister     Cancer Maternal Aunt         Lung cancer       Review of Systems:      Constitutional: Denies fever, shaking or chills   Eyes: Denies change in visual acuity   HEENT: Denies nasal congestion or sore throat   Respiratory: Denies cough or shortness of breath   Cardiovascular: Denies chest pain or edema  Endocrine: Denies tremors, palpitations, intolerance of heat or cold, polyuria, polydipsia.  GI: Denies abdominal pain, nausea, vomiting, bloody stools or diarrhea  : Denies frequency, urgency, incontinence, retention, or nocturia.  Musculoskeletal: Denies numbness, tingling or loss of motor  "function except as above  Integument: Denies rash, lesion or ulceration   Neurologic: Denies headache or focal weakness, deficits  Heme: Denies spontaneous or excessive bleeding, epistaxis, hematuria, melena, fatigue, enlarged or tender lymph nodes.      All other pertinent positives and negatives as noted above in HPI.    Physical Exam:   78 y.o. female  Vitals:    03/25/24 0823   Temp: 98.6 °F (37 °C)   TempSrc: Temporal   Weight: 81.3 kg (179 lb 4.8 oz)   Height: 162.6 cm (64\")     General:  Patient is awake and alert.  Appears in no acute distress or discomfort.    Psych:  Affect and demeanor are appropriate.    Cardiovascular:  Regular rate and rhythm.    Lungs:  Good chest expansion.  Breathing unlabored.    Extremities:  Left shoulder is examined.  Skin is benign.  She has some atrophy of the deltoid but no other obvious abnormalities on inspection.  Moderate anterior tenderness but no effusion.  Active motion is limited and uncomfortable--forward elevation 100, external rotation 20, internal rotation to about L2.  She has weakness with shoulder abduction but her deltoid does fire.  Intact axillary nerve sensation. Normal motor and sensory function in the lower arm and hand.  Palpable radial pulse.  Brisk capillary refill.    Right shoulder is just briefly examined.  Skin is benign.  Moderate bursal effusion.  Mild anterior tenderness.  Full motion.  She has pain and weakness with forward elevation and abduction.    Imaging: AP, scapular Y and axial views left shoulder are ordered and reviewed to evaluate her complaint.  These are compared to previous x-rays.  She has end-stage glenohumeral osteoarthritis with bone-on-bone, subchondral sclerosis and severe glenoid erosion with retroversion    Assessment/Plan:  1.  End-stage left glenohumeral osteoarthritis 2.  Right shoulder pain and effusion, suspected osteoarthritis and rotator cuff tear    First of all, I offered to get x-rays of her right shoulder.  She " says that the left is the worst of the 2 and she would prefer to focus on that today.  We discussed the natural history of this condition and all available treatment options, both surgical and nonsurgical.  The risk, benefits and alternatives to a reverse total shoulder arthroplasty were carefully discussed.  All questions posed by her and her  were answered in detail. She wants to move forward with an arthroplasty.  We will look at getting this scheduled.  I thank you would be a good idea to inject her right shoulder at the time of the left shoulder surgery.  She agrees.  I will have her follow-up with either myself or Rylee for a preoperative visit.    Juan Galeas MD  03/25/2024

## 2024-03-26 ENCOUNTER — TELEPHONE (OUTPATIENT)
Dept: ORTHOPEDIC SURGERY | Facility: CLINIC | Age: 79
End: 2024-03-26
Payer: MEDICARE

## 2024-03-26 PROBLEM — M19.019 ARTHRITIS OF SHOULDER: Status: ACTIVE | Noted: 2024-03-25

## 2024-03-26 NOTE — TELEPHONE ENCOUNTER
Have left a message for the patient that Dr. Galeas is not aware of any contraindications to surgery with the eye injections but does want her to check with her ophthalmologist.  Have left a message for her to call me if she has any questions

## 2024-03-26 NOTE — TELEPHONE ENCOUNTER
Have contacted the patient regarding surgery scheduling for her left shoulder.  We are tentatively looking at scheduling her in May.  However she did have some concerns about the injection she gets in her eyes for her macular degeneration.  She is on Eylea and Vabysmo that she gets every 30 days for 3 months in a row.  Have advised her that I do not know of any contraindication for her having the injection and then a week later having her surgery.  Will check with [facility].  Have also advised her to contact her eye doctor to make sure that they have no contraindication for her proceeding with surgery

## 2024-04-03 ENCOUNTER — TELEPHONE (OUTPATIENT)
Dept: ORTHOPEDIC SURGERY | Facility: CLINIC | Age: 79
End: 2024-04-03

## 2024-04-03 NOTE — TELEPHONE ENCOUNTER
Provider: DR PORTER     Caller: PATIENT     Pharmacy: [facility]     Phone Number: [phone]    Reason for Call: PATIENT WAS SEEN ON 3-25-24 FOR HER LEFT SHOULDER AND DISCUSSED HER RIGHT SHOULDER AS WELL. SHE STARTED HAVING INCREASED PAIN IN THE RIGHT SHOULDER LAST NIGHT AROUND 11:30 AND IT HAS NOT SUBSIDED. SHE IS TAKING 2 EXTRA STRENGTH TYLENOL EVERY SIX HOURS (2 DOSES SO FAR) AND HAS USED BIOFREEZE WITH NO RELIEF. SHE WOULD LIKE TO KNOW IF THERE IS ANY RECOMMENDATIONS ON WHAT SHE CAN DO FOR SOME RELIEF FROM THIS PAIN. PLEASE CALL TO DISCUSS. OKAY TO LEAVE A VOICEMAIL.

## 2024-04-03 NOTE — TELEPHONE ENCOUNTER
Patient is scheduled to come to [facility] tomorrow at 2:10

## 2024-04-04 ENCOUNTER — OFFICE VISIT (OUTPATIENT)
Age: 79
End: 2024-04-04
Payer: MEDICARE

## 2024-04-04 VITALS — WEIGHT: 179 LBS | TEMPERATURE: 97.8 F | BODY MASS INDEX: 30.56 KG/M2 | HEIGHT: 64 IN

## 2024-04-04 DIAGNOSIS — M19.011 ARTHRITIS OF RIGHT SHOULDER REGION: Primary | ICD-10-CM

## 2024-04-04 PROCEDURE — 99213 OFFICE O/P EST LOW 20 MIN: CPT | Performed by: NURSE PRACTITIONER

## 2024-04-04 PROCEDURE — 1160F RVW MEDS BY RX/DR IN RCRD: CPT | Performed by: NURSE PRACTITIONER

## 2024-04-04 PROCEDURE — 20610 DRAIN/INJ JOINT/BURSA W/O US: CPT | Performed by: NURSE PRACTITIONER

## 2024-04-04 PROCEDURE — 73030 X-RAY EXAM OF SHOULDER: CPT | Performed by: NURSE PRACTITIONER

## 2024-04-04 PROCEDURE — 1159F MED LIST DOCD IN RCRD: CPT | Performed by: NURSE PRACTITIONER

## 2024-04-04 RX ADMIN — METHYLPREDNISOLONE ACETATE 80 MG: 80 INJECTION, SUSPENSION INTRA-ARTICULAR; INTRALESIONAL; INTRAMUSCULAR; SOFT TISSUE at 14:47

## 2024-04-04 RX ADMIN — LIDOCAINE HYDROCHLORIDE 2 ML: 10 INJECTION, SOLUTION EPIDURAL; INFILTRATION; INTRACAUDAL; PERINEURAL at 14:47

## 2024-04-04 NOTE — PROGRESS NOTES
Patient: Sagrario Varma    YOB: 1945    Medical Record Number: [id number]    Chief Complaints:  Right shoulder pain    History of Present Illness:     78 y.o. female patient who presents with a complaint of right shoulder pain. Reports her right shoulder pain increased significantly 2 days ago.  Denies injury or precipitating factors.  Pain is moderate to severe, constant and aching. She reports she typically has a high pain tolerance, but this pain has been very intense.  She has tried rest, activity modifications, and Biofreeze with mild temporary relief.  She has surgery scheduled for left shoulder replacement coming up in early May.  She is very concerned about how well she will do with rehab and recovery with her current right shoulder pain.  She denies any shooting pain down the arm, weakness, numbness or paresthesias.      Allergies:   Allergies   Allergen Reactions    Penicillins Hives and Swelling    Morphine Nausea And Vomiting       Home Medications:    Current Outpatient Medications:     Aflibercept 2 MG/0.05ML solution, , Disp: , Rfl:     Ascorbic Acid (VITAMIN C) 500 MG capsule, Take 1 capsule by mouth., Disp: , Rfl:     aspirin 81 MG oral suspension, , Disp: , Rfl:     atorvastatin (LIPITOR) 10 MG tablet, TAKE 1 TABLET DAILY, Disp: 90 tablet, Rfl: 3    benazepril-hydrochlorthiazide (LOTENSIN HCT) 20-12.5 MG per tablet, TAKE 1 TABLET DAILY, Disp: 90 tablet, Rfl: 3    Calcium 500-125 MG-UNIT tablet, Take 1 tablet by mouth., Disp: , Rfl:     cetirizine (zyrTEC) 10 MG tablet, , Disp: , Rfl:     Cholecalciferol (VITAMIN D) 1000 UNITS tablet, Take 1 tablet by mouth., Disp: , Rfl:     Denosumab (PROLIA SC), Inject  under the skin Every 6 (Six) Months., Disp: , Rfl:     doxycycline (VIBRAMYCIN) 100 MG capsule, Take 1 capsule by mouth., Disp: , Rfl:     Multiple Vitamins-Minerals (MULTIVITAMIN ADULT PO), Take  by mouth., Disp: , Rfl:     mupirocin (BACTROBAN) 2 % ointment, Apply 1 Application  topically to the appropriate area as directed., Disp: , Rfl:     sulfacetamide sodium-sulfur (AVAR,PLEXION) 10-5 % topical emulsion, , Disp: , Rfl:     Synthroid 50 MCG tablet, TAKE 1 TABLET DAILY, Disp: 90 tablet, Rfl: 3    Past Medical History:   Diagnosis Date    Age related osteoporosis 03/23/2021    Arthritis of back Years ago    Cancer     Cataract Recent    Cervical disc disorder Jul 2010    Narrowing of C5 & C7 noted    Class 1 obesity due to excess calories without serious comorbidity with body mass index (BMI) of 32.0 to 32.9 in adult 9/26/2023    Colon polyp     DCIS (ductal carcinoma in situ)     right    Fracture of wrist 1951    Left wrist    Fracture, clavicle Don't recall    Was told I had one    Fracture, humerus 1951    Left arm    Fracture, ulna 1951    Left arm    Fractures 1951    Left arm, 3 places    HBP (high blood pressure)     hypertension    Hypercholesterolemia     Multinodular goiter     of thyroid gland    Osteoarthritis     Periarthritis of shoulder     Saw Dr. Galeas for this previously.    Postoperative hypothyroidism 01/28/2020    Rotator cuff syndrome Years ago    Thyroid nodule 12/23/2013    Visual impairment 3/2019    Macular degeneration       Past Surgical History:   Procedure Laterality Date    APPENDECTOMY  01/1961    BREAST LUMPECTOMY Right 05/2010    COLONOSCOPY  Feb 2022    No problems found    DILATATION AND CURETTAGE      x2 for excessive bleeding    JOINT REPLACEMENT  Apr 2014    Right TKR    KNEE SURGERY      OVARIAN CYST SURGERY  01/1961    THYROIDECTOMY      TRIGGER POINT INJECTION  Mar 2019    5 in left shoulder    WRIST SURGERY  1951       Social History     Occupational History    Occupation: [employer]     Employer: RETIRED   Tobacco Use    Smoking status: Never     Passive exposure: Never    Smokeless tobacco: Never   Vaping Use    Vaping status: Never Used   Substance and Sexual Activity    Alcohol use: No    Drug use: No    Sexual activity: Yes      Partners: Male     Birth control/protection: None      Social History     Social History Narrative    Retired, former retail and        Family History   Problem Relation Age of Onset    Hyperlipidemia Other     Hypertension Other     Stroke Other     Kidney failure Other     Diabetes Other     Heart disease Other     Melanoma Other     Stroke Mother     Hyperlipidemia Mother     Kidney disease Mother     Vision loss Mother     Diabetes Mother     Thrombosis Father     Hyperlipidemia Father     Breast cancer Sister 50        Metastatic    Cancer Sister         Metastatic breast cancer    Diabetes Sister         Likely due to cancer treatment    Melanoma Brother 41    Hodgkin's lymphoma Brother 41        non-Hodgkin lymphoma    Cancer Brother         NHL, Lymphoma, Leukemia    Hyperlipidemia Brother     Cancer Paternal Aunt     Cancer Sister         Breast cancer    Hyperlipidemia Sister     Cancer Maternal Aunt         Lung cancer       Review of Systems:      Constitutional: Denies fever, shaking or chills   Eyes: Denies change in visual acuity   HEENT: Denies nasal congestion or sore throat   Respiratory: Denies cough or shortness of breath   Cardiovascular: Denies chest pain or edema  Endocrine: Denies tremors, palpitations, intolerance of heat or cold, polyuria, polydipsia.  GI: Denies abdominal pain, nausea, vomiting, bloody stools or diarrhea  : Denies frequency, urgency, incontinence, retention, or nocturia.  Musculoskeletal: Denies numbness, tingling or loss of motor function   Integument: Denies rash, lesion or ulceration   Neurologic: Denies headache or focal weakness, deficits  Heme: Denies spontaneous or excessive bleeding, epistaxis, hematuria, melena, fatigue, enlarged or tender lymph nodes.      All other pertinent positives and negatives as noted above in HPI.    Physical Exam:   78 y.o. female  Vitals:    04/04/24 1415   Temp: 97.8 °F (36.6 °C)   TempSrc: Temporal   Weight: 81.2 kg  "(179 lb)   Height: 162.6 cm (64.02\")       General:  Patient is awake and alert.  Appears in no acute distress or discomfort.    Psych:  Affect and demeanor are appropriate.    Eyes:  Conjunctiva and sclera appear grossly normal.  Eyes track well and EOM seem to be intact.    Ears:  No gross abnormalities.  Hearing adequate for the exam.    Cardiovascular:  Regular rate and rhythm.    Lungs:  Good chest expansion.  Breathing unlabored.    Spine:  Neck appears grossly normal.  No palpable masses or adenopathy.  Good motion.  Spurling's maneuver is negative for any shoulder or arm symptoms.    Extremities:  Right shoulder is examined.  Skin is benign.  No obvious gross abnormalities.  No palpable masses or adenopathy.  Mild edema.  Moderate tenderness noted over anterior glenohumeral joint and rotator interval.  Motion is to limited and uncomfortable:  150° FE, 45° ER, IR to back pocket.  Passive motion is good.  No evident instability.  4 out of 5 strength with resisted forward elevation and abduction.  Good strength with internal and external rotation.  Negative external rotation lag sign.  Good motor and sensory function in her lower arm and hand.  Palpable radial pulse.  Brisk capillary refill.         Radiology:  AP, scapular Y, and axillary views of the right shoulder are ordered by myself and reviewed to evaluate the patient's complaint.  No comparison films are immediately available.  The x-rays show advanced glenohumeral osteoarthritis with joint space narrowing, osteophyte formation, and subchondral sclerosis.  Moderate acromioclavicular joint arthritis as well.  The acromiohumeral interval measures normal.    Assessment/Plan:  Right shoulder osteoarthritis with suspected rotator cuff tear    We discussed treatment options in detail including conservative treatment versus surgical options.  Regarding conservative treatment, we discussed appropriate activity modifications, anti-inflammatories, injections, and " physical therapy.  We also discussed the option of an arthroplasty and all that would entail.  I have recommended that we start with a conservative approach and the patient agrees.    She has acknowledged understanding of the information and elected for an injection.  The risk, benefits and alternatives were discussed.  The patient consented and the injection was performed as described below.  Going forward, patient will follow-up with me on an as-needed basis.    MEGHAN [place]    04/04/2024    CC to Sumeet Delgado MD    Much of this encounter note is an electronic transcription/translation of spoken language to printed text. The electronic translation of spoken language may permit erroneous, or at times, nonsensical words or phrases to be inadvertently transcribed.  Although I have reviewed the note for such errors, some may still exist.    Large Joint Arthrocentesis: R subacromial bursa  Date/Time: 4/4/2024 2:47 PM  Consent given by: patient  Site marked: site marked  Timeout: Immediately prior to procedure a time out was called to verify the correct patient, procedure, equipment, support staff and site/side marked as required   Supporting Documentation  Indications: pain   Procedure Details  Location: shoulder - R subacromial bursa  Preparation: Patient was prepped and draped in the usual sterile fashion  Needle gauge: 21G.  Approach: posterior  Medications administered: 80 mg methylPREDNISolone acetate 80 MG/ML; 2 mL lidocaine PF 1% 1 %  Patient tolerance: patient tolerated the procedure well with no immediate complications

## 2024-04-08 RX ORDER — METHYLPREDNISOLONE ACETATE 80 MG/ML
80 INJECTION, SUSPENSION INTRA-ARTICULAR; INTRALESIONAL; INTRAMUSCULAR; SOFT TISSUE
Status: COMPLETED | OUTPATIENT
Start: 2024-04-04 | End: 2024-04-04

## 2024-04-08 RX ORDER — LIDOCAINE HYDROCHLORIDE 10 MG/ML
2 INJECTION, SOLUTION EPIDURAL; INFILTRATION; INTRACAUDAL; PERINEURAL
Status: COMPLETED | OUTPATIENT
Start: 2024-04-04 | End: 2024-04-04

## 2024-04-15 ENCOUNTER — OFFICE VISIT (OUTPATIENT)
Age: 79
End: 2024-04-15
Payer: MEDICARE

## 2024-04-15 ENCOUNTER — HOSPITAL ENCOUNTER (OUTPATIENT)
Facility: HOSPITAL | Age: 79
Discharge: HOME | End: 2024-04-15
Admitting: NURSE PRACTITIONER
Payer: MEDICARE

## 2024-04-15 VITALS
DIASTOLIC BLOOD PRESSURE: 78 MMHG | HEIGHT: 64 IN | WEIGHT: 179 LBS | HEART RATE: 76 BPM | BODY MASS INDEX: 30.56 KG/M2 | SYSTOLIC BLOOD PRESSURE: 128 MMHG

## 2024-04-15 DIAGNOSIS — I65.23 BILATERAL CAROTID ARTERY STENOSIS: Primary | ICD-10-CM

## 2024-04-15 DIAGNOSIS — I65.23 CAROTID STENOSIS, BILATERAL: ICD-10-CM

## 2024-04-15 DIAGNOSIS — I65.23 BILATERAL CAROTID ARTERY OCCLUSION: ICD-10-CM

## 2024-04-15 LAB
BH CV XLRA MEAS LEFT CAROTID BULB EDV: 33 CM/SEC
BH CV XLRA MEAS LEFT CAROTID BULB PSV: 88.8 CM/SEC
BH CV XLRA MEAS LEFT DIST CCA EDV: -36.9 CM/SEC
BH CV XLRA MEAS LEFT DIST CCA PSV: -108.4 CM/SEC
BH CV XLRA MEAS LEFT DIST ICA EDV: -38.5 CM/SEC
BH CV XLRA MEAS LEFT DIST ICA PSV: -109.2 CM/SEC
BH CV XLRA MEAS LEFT ICA/CCA RATIO: 1.32
BH CV XLRA MEAS LEFT MID CCA EDV: 25.1 CM/SEC
BH CV XLRA MEAS LEFT MID CCA PSV: 86.4 CM/SEC
BH CV XLRA MEAS LEFT MID ICA EDV: 44.8 CM/SEC
BH CV XLRA MEAS LEFT MID ICA PSV: 111.6 CM/SEC
BH CV XLRA MEAS LEFT PROX CCA EDV: 27.5 CM/SEC
BH CV XLRA MEAS LEFT PROX CCA PSV: 92.3 CM/SEC
BH CV XLRA MEAS LEFT PROX ECA EDV: -24.4 CM/SEC
BH CV XLRA MEAS LEFT PROX ECA PSV: -118.6 CM/SEC
BH CV XLRA MEAS LEFT PROX ICA EDV: -45.6 CM/SEC
BH CV XLRA MEAS LEFT PROX ICA PSV: -143 CM/SEC
BH CV XLRA MEAS LEFT PROX SCLA PSV: 177.8 CM/SEC
BH CV XLRA MEAS LEFT VERTEBRAL A EDV: 18.1 CM/SEC
BH CV XLRA MEAS LEFT VERTEBRAL A PSV: 68.4 CM/SEC
BH CV XLRA MEAS RIGHT CAROTID BULB EDV: 33 CM/SEC
BH CV XLRA MEAS RIGHT CAROTID BULB PSV: 121 CM/SEC
BH CV XLRA MEAS RIGHT DIST CCA EDV: -26.7 CM/SEC
BH CV XLRA MEAS RIGHT DIST CCA PSV: -89.6 CM/SEC
BH CV XLRA MEAS RIGHT DIST ICA EDV: -56 CM/SEC
BH CV XLRA MEAS RIGHT DIST ICA PSV: -162.1 CM/SEC
BH CV XLRA MEAS RIGHT ICA/CCA RATIO: 2.03
BH CV XLRA MEAS RIGHT MID CCA EDV: 34.6 CM/SEC
BH CV XLRA MEAS RIGHT MID CCA PSV: 135.9 CM/SEC
BH CV XLRA MEAS RIGHT MID ICA EDV: -44.2 CM/SEC
BH CV XLRA MEAS RIGHT MID ICA PSV: -139.5 CM/SEC
BH CV XLRA MEAS RIGHT PROX CCA EDV: 29.1 CM/SEC
BH CV XLRA MEAS RIGHT PROX CCA PSV: 106.9 CM/SEC
BH CV XLRA MEAS RIGHT PROX ECA EDV: -21.2 CM/SEC
BH CV XLRA MEAS RIGHT PROX ECA PSV: -129.6 CM/SEC
BH CV XLRA MEAS RIGHT PROX ICA EDV: 57.6 CM/SEC
BH CV XLRA MEAS RIGHT PROX ICA PSV: 182 CM/SEC
BH CV XLRA MEAS RIGHT PROX SCLA PSV: 166 CM/SEC
BH CV XLRA MEAS RIGHT VERTEBRAL A EDV: 21.1 CM/SEC
BH CV XLRA MEAS RIGHT VERTEBRAL A PSV: 51.6 CM/SEC
LEFT ARM BP: NORMAL MMHG
RIGHT ARM BP: NORMAL MMHG

## 2024-04-15 PROCEDURE — 99213 OFFICE O/P EST LOW 20 MIN: CPT | Performed by: NURSE PRACTITIONER

## 2024-04-15 PROCEDURE — 93880 EXTRACRANIAL BILAT STUDY: CPT

## 2024-04-15 PROCEDURE — 1160F RVW MEDS BY RX/DR IN RCRD: CPT | Performed by: NURSE PRACTITIONER

## 2024-04-15 PROCEDURE — 3074F SYST BP LT 130 MM HG: CPT | Performed by: NURSE PRACTITIONER

## 2024-04-15 PROCEDURE — 3078F DIAST BP <80 MM HG: CPT | Performed by: NURSE PRACTITIONER

## 2024-04-15 PROCEDURE — 93880 EXTRACRANIAL BILAT STUDY: CPT | Performed by: SURGERY

## 2024-04-15 PROCEDURE — G2211 COMPLEX E/M VISIT ADD ON: HCPCS | Performed by: NURSE PRACTITIONER

## 2024-04-15 PROCEDURE — 1159F MED LIST DOCD IN RCRD: CPT | Performed by: NURSE PRACTITIONER

## 2024-04-15 NOTE — PROGRESS NOTES
Chief Complaint  Carotid Artery Disease    Subjective        Sagrario Varma presents to [facility] VASCULAR SURGERY  HPI   Sagrario Varma is a 78 y.o. female that has been followed in our office for carotid artery stenosis. She returns today in follow up along with a carotid duplex. She  reports she has been doing well without hospitalizations or surgeries. She denies any symptoms consistent with CVA, TIA, or amaurosis fugax. She is having a left shoulder replacement next month.     Review of Systems   Constitutional:  Negative for fever.   Eyes:  Negative for visual disturbance.   Cardiovascular:  Negative for leg swelling.   Gastrointestinal:  Negative for abdominal pain.   Musculoskeletal:  Positive for joint swelling and myalgias. Negative for back pain.   Skin:  Negative for color change, pallor and wound.   Neurological:  Negative for dizziness, facial asymmetry, speech difficulty and weakness.        Sagraroi Varma  reports that she has never smoked. She has never been exposed to tobacco smoke. She has never used smokeless tobacco..        Objective   Vital Signs:  Vitals:    04/15/24 0826   BP: 128/78   Pulse: 76      Body mass index is 30.71 kg/m².       Physical Exam  Vitals reviewed.   Constitutional:       Appearance: Normal appearance.   HENT:      Head: Normocephalic.   Cardiovascular:      Rate and Rhythm: Normal rate and regular rhythm.      Pulses: Normal pulses.           Dorsalis pedis pulses are 3+ on the right side and 3+ on the left side.        Posterior tibial pulses are 3+ on the right side and 3+ on the left side.   Pulmonary:      Effort: Pulmonary effort is normal.   Skin:     General: Skin is warm.   Neurological:      General: No focal deficit present.      Mental Status: She is alert and oriented to person, place, and time.   Psychiatric:         Mood and Affect: Mood normal.        Result Review :      Previous carotid duplex: 50-69% stenosis on the right, 50%  stenosis on the left     Carotid duplex from today: 50-69% stenosis on the right and <50% stenosis on the left                   Assessment and Plan     Diagnoses and all orders for this visit:    1. Bilateral carotid artery stenosis (Primary)             Patient has stable carotid artery stenosis. She is to continue her antiplatelet agent which is aspirin. She is on a statin for cholesterol control.  We discussed adequate blood pressure control. She will return in 1 year along with a repeat carotid artery duplex.    Follow Up     No follow-ups on file.  Patient was given instructions and counseling regarding her condition or for health maintenance advice. Please see specific information pulled into the AVS if appropriate.     MEGHAN Costello

## 2024-04-22 ENCOUNTER — PRE-ADMISSION TESTING (OUTPATIENT)
Dept: PREADMISSION TESTING | Facility: HOSPITAL | Age: 79
End: 2024-04-22
Payer: MEDICARE

## 2024-04-22 VITALS
RESPIRATION RATE: 16 BRPM | DIASTOLIC BLOOD PRESSURE: 90 MMHG | HEIGHT: 64 IN | WEIGHT: 178.2 LBS | SYSTOLIC BLOOD PRESSURE: 148 MMHG | BODY MASS INDEX: 30.42 KG/M2 | HEART RATE: 89 BPM | OXYGEN SATURATION: 99 % | TEMPERATURE: 97.1 F

## 2024-04-22 LAB
ANION GAP SERPL CALCULATED.3IONS-SCNC: 10.7 MMOL/L (ref 5–15)
BUN SERPL-MCNC: 13 MG/DL (ref 8–23)
BUN/CREAT SERPL: 20.6 (ref 7–25)
CALCIUM SPEC-SCNC: 10.3 MG/DL (ref 8.6–10.5)
CHLORIDE SERPL-SCNC: 100 MMOL/L (ref 98–107)
CO2 SERPL-SCNC: 29.3 MMOL/L (ref 22–29)
CREAT SERPL-MCNC: 0.63 MG/DL (ref 0.57–1)
DEPRECATED RDW RBC AUTO: 38.7 FL (ref 37–54)
EGFRCR SERPLBLD CKD-EPI 2021: 91.5 ML/MIN/1.73
ERYTHROCYTE [DISTWIDTH] IN BLOOD BY AUTOMATED COUNT: 12.2 % (ref 12.3–15.4)
GLUCOSE SERPL-MCNC: 86 MG/DL (ref 65–99)
HCT VFR BLD AUTO: 39.2 % (ref 34–46.6)
HGB BLD-MCNC: 12.8 G/DL (ref 12–15.9)
MCH RBC QN AUTO: 28.5 PG (ref 26.6–33)
MCHC RBC AUTO-ENTMCNC: 32.7 G/DL (ref 31.5–35.7)
MCV RBC AUTO: 87.3 FL (ref 79–97)
PLATELET # BLD AUTO: 411 10*3/MM3 (ref 140–450)
PMV BLD AUTO: 9.2 FL (ref 6–12)
POTASSIUM SERPL-SCNC: 4.3 MMOL/L (ref 3.5–5.2)
QT INTERVAL: 380 MS
QTC INTERVAL: 410 MS
RBC # BLD AUTO: 4.49 10*6/MM3 (ref 3.77–5.28)
SODIUM SERPL-SCNC: 140 MMOL/L (ref 136–145)
WBC NRBC COR # BLD AUTO: 6.02 10*3/MM3 (ref 3.4–10.8)

## 2024-04-22 PROCEDURE — 93010 ELECTROCARDIOGRAM REPORT: CPT | Performed by: INTERNAL MEDICINE

## 2024-04-22 PROCEDURE — 36415 COLL VENOUS BLD VENIPUNCTURE: CPT

## 2024-04-22 PROCEDURE — 93005 ELECTROCARDIOGRAM TRACING: CPT

## 2024-04-22 PROCEDURE — 85027 COMPLETE CBC AUTOMATED: CPT

## 2024-04-22 PROCEDURE — 80048 BASIC METABOLIC PNL TOTAL CA: CPT

## 2024-04-22 RX ORDER — ACETAMINOPHEN 500 MG
500 TABLET ORAL EVERY 6 HOURS PRN
COMMUNITY

## 2024-04-22 RX ORDER — AFLIBERCEPT 8 MG/.07ML
INJECTION, SOLUTION INTRAVITREAL
COMMUNITY
Start: 2024-01-01

## 2024-04-22 RX ORDER — ASPIRIN 81 MG/1
81 TABLET ORAL DAILY
COMMUNITY

## 2024-04-22 NOTE — DISCHARGE INSTRUCTIONS
THE HOSPITAL WILL CALL YOU 1-2 DAYS PRIOR TO SURGERY      Take the following medications the morning of surgery:  LEVOTHYROXINE      If you are on prescription narcotic pain medication to control your pain you may also take that medication the morning of surgery.    General Instructions:  Do not eat solid food after midnight the night before surgery.  You may drink clear liquids day of surgery but must stop at least one hour before your hospital arrival time.  It is beneficial for you to have a clear drink that contains carbohydrates the day of surgery.  We suggest a 12 to 20 ounce bottle of Gatorade or Powerade for non-diabetic patients or a 12 to 20 ounce bottle of G2 or Powerade Zero for diabetic patients. (Pediatric patients, are not advised to drink a 12 to 20 ounce carbohydrate drink)    Clear liquids are liquids you can see through.  Nothing red in color.     Plain water                               Sports drinks  Sodas                                   Gelatin (Jell-O)  Fruit juices without pulp such as white grape juice and apple juice  Popsicles that contain no fruit or yogurt  Tea or coffee (no cream or milk added)  Gatorade / Powerade  G2 / Powerade Zero    Infants may have breast milk up to four hours before surgery.  Infants drinking formula may drink formula up to six hours before surgery.   Patients who avoid smoking, chewing tobacco and alcohol for 4 weeks prior to surgery have a reduced risk of post-operative complications.  Quit smoking as many days before surgery as you can.  Do not smoke, use chewing tobacco or drink alcohol the day of surgery.   If applicable bring your C-PAP/ BI-PAP machine in with you to preop day of surgery.  Bring any papers given to you in the doctor’s office.  Wear clean comfortable clothes.  Do not wear contact lenses, false eyelashes or make-up.  Bring a case for your glasses.   Bring crutches or walker if applicable.  Remove all piercings.  Leave jewelry and any  other valuables at home.  Hair extensions with metal clips must be removed prior to surgery.  The Pre-Admission Testing nurse will instruct you to bring medications if unable to obtain an accurate list in Pre-Admission Testing.        If you were given a blood bank ID arm band remember to bring it with you the day of surgery.    Day of surgery:  Your arrival time is approximately two hours before your scheduled surgery time.  Upon arrival, a Pre-op nurse and Anesthesiologist will review your health history, obtain vital signs, and answer questions you may have.  The only belongings needed at this time will be a list of your home medications and if applicable your C-PAP/BI-PAP machine.  A Pre-op nurse will start an IV and you may receive medication in preparation for surgery, including something to help you relax.     Please be aware that surgery does come with discomfort.  We want to make every effort to control your discomfort so please discuss any uncontrolled symptoms with your nurse.   Your doctor will most likely have prescribed pain medications.      If you are going home after surgery you will receive individualized written care instructions before being discharged.  A responsible adult must drive you to and from the hospital on the day of your surgery and ideally stay with you through the night.  Discharge prescriptions can be filled by the hospital pharmacy during regular pharmacy hours.  If you are having surgery late in the day/evening your prescription may be e-prescribed to your pharmacy.  Please verify your pharmacy hours or chose a 24 hour pharmacy to avoid not having access to your prescription because your pharmacy has closed for the day.    If you are staying overnight following surgery, you will be transported to your hospital room following the recovery period.  [facility] has all private rooms.    If you have any questions please call Pre-Admission Testing at  [phone].  Deductibles and co-payments are collected on the day of service. Please be prepared to pay the required co-pay, deductible or deposit on the day of service as defined by your plan.    Call your surgeon immediately if you experience any of the following symptoms:  Sore Throat  Shortness of Breath or difficulty breathing  Cough  Chills  Body soreness or muscle pain  Headache  Fever  New loss of taste or smell  Do not arrive for your surgery ill.  Your procedure will need to be rescheduled to another time.  You will need to call your physician before the day of surgery to avoid any unnecessary exposure to hospital staff as well as other patients.        PREVENTING INFECTION IN SHOULDER SURGICAL SITES     C. acnes is a bacteria that lives deep within follicles and pores of the skin. It is found in large numbers on the skin of the face, axilla (armpit), chest and back and is the primary bacteria to cause a surgical site infection after shoulder surgery.      Use of a Benzoyl Peroxide solution prior to shoulder surgery decreases C. acnes and reduces post-op infections.   Your surgeon has ordered 5% Benzoyl Peroxide wash to be used three times prior to your surgery.     Please read the following instructions carefully and bring this form with you the day of surgery.     General bathing instructions starting two days before your surgery:    Shower using a fresh bar of anti-bacterial soap (such as Dial) and clean washcloth.  Pay special attention to the neck, shoulder and armpit area.   Wash your hair as usual with your regular shampoo.   Rinse hair and body thoroughly with warm water (not hot water) to remove shampoo and residue.   Dry with a clean towel.              Sleep in a clean bed with clean clothing.  Do not allow pets to sleep with you.     For 2 days before surgery, avoid shaving with a razor because the razor can irritate skin and make it easier to develop an infection.    Any areas of open skin  can increase the risk of a post-operative wound infection by allowing bacteria to enter and travel throughout the body.  Notify your surgeon if you have any skin wounds / rashes even if it is not near the expected surgical site.  The area will need assessed to determine if surgery should be delayed until it is healed.      First application of 5% Benzoyl Peroxide Wash two nights before surgery:                                                                Wash neck, shoulder (front, back, side) and armpit   with warm water, rinse and dry - see picture.  Gently wash the same areas with the Benzoyl Peroxide   cleanser going away from the neck for 10-20 seconds.   Work into a full lather and leave on the skin for   2 minutes for greatest effect.  Rinse thoroughly with warm water, not hot water.                     Pat dry with a clean towel.                                                            Wash your hands thoroughly.  Do not apply lotion, powder, perfume or deodorant.   Put on clean clothes.    Second application the night before surgery:  Repeat the above steps.        Third application morning of surgery:  Repeat the above steps.    Due to shoulder pain or decreased range of motion of your shoulder and arm, you may need assistance washing under the arm or the back portion of your shoulder.     Avoid further washing the areas of the skin treated with Benzoyl Peroxide for at least 1 hour.    For your convenience, you may purchase Benzoyl Peroxide at [facility] retail pharmacy.     Warning:  Let your physician know if you are allergic to Benzoyl Peroxide or have very sensitive skin and cannot use it.   Stop using and contact your surgeon if you experience any excessive scaling, itching, swelling, skin irritation or other signs of a reaction.  Keep out of eyes, ears, nose and mouth.  Do not apply to sunburned, irritated or broken area on the skin.  Avoid unwanted problems with bleaching effect by following  these tips:  Wash hands after each use.  Avoid contact with hair, clothing, furnishings or carpeting.  Wear clean, old t-shirt or clothing to bed.   Use clean, old white pillow cases and sheets to avoid discoloring your bed linens.                                                                                                                                                                                                                                                                                   Please complete the checklist below, bring it with you to the hospital                               the day of surgery and give to the Pre-op Nurse     Preoperative Skin Prep Checklist        Patient Name Label             Enter dates and ?  boxes to indicate completed    Surgery Date: _______________ Regular Shower  Benzoyl Peroxide Wash   First Application:  2 days before_______________  (Stop shaving all body parts)           Morning or Evening                        Evening    Second Application:  1 day  before________________        Morning or Evening                          Evening   Third Application:  Day of Surgery______________                           Morning                   Morning

## 2024-04-26 ENCOUNTER — OFFICE VISIT (OUTPATIENT)
Dept: ORTHOPEDIC SURGERY | Facility: CLINIC | Age: 79
End: 2024-04-26
Payer: MEDICARE

## 2024-04-26 VITALS — WEIGHT: 178.3 LBS | BODY MASS INDEX: 28.66 KG/M2 | HEIGHT: 66 IN | TEMPERATURE: 98.6 F

## 2024-04-26 DIAGNOSIS — R52 PAIN: Primary | ICD-10-CM

## 2024-04-26 NOTE — PROGRESS NOTES
History & Physical         Patient: Sagrario Varma    YOB: 1945    Medical Record Number: [id number]    Chief Complaints:   Chief Complaint   Patient presents with    Left Shoulder - Pre-op Exam       History of Present Illness: 78 y.o. female comes in today for upcoming shoulder replacement surgery. Reports a long history of progressively worsening pain, motion loss, and dysfunction.  Describes current pain as severe.  Has failed prolonged conservative treatment.  Denies any new complaints or issues.    Allergies:   Allergies   Allergen Reactions    Penicillins Hives and Swelling    Morphine Nausea And Vomiting       Medications:   Home Medications:    Current Outpatient Medications:     Aflibercept (Eylea HD) 8 MG/0.07ML solution, EVERY 8-12- WEEKS, Disp: , Rfl:     Ascorbic Acid (VITAMIN C) 500 MG capsule, Take 1 capsule by mouth Daily. HOLDING FOR DOS, Disp: , Rfl:     aspirin (SB Low Dose ASA EC) 81 MG EC tablet, Take 1 tablet by mouth Daily. HOLDING FOR DOS, Disp: , Rfl:     atorvastatin (LIPITOR) 10 MG tablet, TAKE 1 TABLET DAILY (Patient taking differently: Take 1 tablet by mouth Every Night.), Disp: 90 tablet, Rfl: 3    benazepril-hydrochlorthiazide (LOTENSIN HCT) 20-12.5 MG per tablet, TAKE 1 TABLET DAILY (Patient taking differently: Take 1 tablet by mouth Daily.), Disp: 90 tablet, Rfl: 3    Calcium Carb-Cholecalciferol (CALCIUM 500 + D PO), Take 1 tablet by mouth Daily., Disp: , Rfl:     cetirizine (zyrTEC) 10 MG tablet, Take 1 tablet by mouth Daily., Disp: , Rfl:     Cholecalciferol (VITAMIN D) 1000 UNITS tablet, Take 1 tablet by mouth Daily. HOLDING FOR DOS, Disp: , Rfl:     Denosumab (PROLIA SC), Inject  under the skin Every 6 (Six) Months., Disp: , Rfl:     doxycycline (VIBRAMYCIN) 100 MG capsule, Take 1 capsule by mouth Daily., Disp: , Rfl:     metroNIDAZOLE (METROCREAM) 0.75 % cream, Apply 1 dose topically to the appropriate area as directed 2 (Two) Times a Day., Disp: , Rfl:      Multiple Vitamins-Minerals (MULTIVITAMIN ADULT PO), Take 1 tablet by mouth Daily. HOLDING FOR DOS, Disp: , Rfl:     multivitamins-minerals (PRESERVISION AREDS 2) capsule capsule, Take 1 capsule by mouth 2 (Two) Times a Day. HOLDING FOR DOS, Disp: , Rfl:     mupirocin (BACTROBAN) 2 % ointment, Apply 1 Application topically to the appropriate area as directed Every 12 (Twelve) Hours As Needed., Disp: , Rfl:     sulfacetamide sodium-sulfur (AVAR,PLEXION) 10-5 % topical emulsion, Apply  topically to the appropriate area as directed 2 (Two) Times a Day., Disp: , Rfl:     Synthroid 50 MCG tablet, TAKE 1 TABLET DAILY (Patient taking differently: Take 1 tablet by mouth Daily.), Disp: 90 tablet, Rfl: 3    acetaminophen (TYLENOL) 500 MG tablet, Take 1 tablet by mouth Every 6 (Six) Hours As Needed for Mild Pain. (Patient not taking: Reported on 4/26/2024), Disp: , Rfl:     Past Medical History:   Diagnosis Date    Age related osteoporosis 03/23/2021    Arthritis     Bilateral carotid artery stenosis     4-18-16    Breast cancer     Cancer     Cervical disc disorder Jul 2010    Narrowing of C5 & C7 noted    Class 1 obesity due to excess calories without serious comorbidity with body mass index (BMI) of 32.0 to 32.9 in adult 09/26/2023    Colon polyp     DCIS (ductal carcinoma in situ)     right    Fracture of wrist 1951    Left wrist    Fracture, clavicle Don't recall    Was told I had one    Fracture, humerus 1951    Left arm    Fracture, ulna 1951    Left arm    HBP (high blood pressure)     hypertension    Hypercholesterolemia     Hypertension     Hypothyroidism     Left shoulder pain     Macular degeneration     Multinodular goiter     of thyroid gland    Osteoarthritis     Periarthritis of shoulder     Saw Dr. Galeas for this previously.    PONV (postoperative nausea and vomiting)     Postoperative hypothyroidism 01/28/2020    Rosacea     Rotator cuff syndrome     Seasonal allergies     Thyroid nodule 12/23/2013     BENIGN          Past Surgical History:   Procedure Laterality Date    APPENDECTOMY  01/1961    BREAST LUMPECTOMY Right 05/2010    COLONOSCOPY  Feb 2022    No problems found    DILATATION AND CURETTAGE      x2 for excessive bleeding    ENDOSCOPY      EYE SURGERY Bilateral     CATARACTS    JOINT REPLACEMENT  Apr 2014    Right TKR    KNEE SURGERY      OVARIAN CYST SURGERY  01/1961    THYROIDECTOMY      WRIST SURGERY  1951          Social History     Occupational History    Occupation: [employer]     Employer: RETIRED   Tobacco Use    Smoking status: Never     Passive exposure: Never    Smokeless tobacco: Never   Vaping Use    Vaping status: Never Used   Substance and Sexual Activity    Alcohol use: Yes     Comment: SELDOM    Drug use: No    Sexual activity: Yes     Partners: Male     Birth control/protection: None      Social History     Social History Narrative    Retired, former retail and           Family History   Problem Relation Age of Onset    Stroke Mother 71    Hyperlipidemia Mother     Kidney disease Mother 71    Vision loss Mother     Diabetes Mother     Thrombosis Father     Hyperlipidemia Father     Clotting disorder Father 57    Breast cancer Sister 50        Metastatic    Cancer Sister         Metastatic breast cancer    Diabetes Sister         Likely due to cancer treatment    Cancer Sister         Breast cancer    Hyperlipidemia Sister     Melanoma Brother 41    Hodgkin's lymphoma Brother 41        non-Hodgkin lymphoma    Cancer Brother         NHL, Lymphoma, Leukemia    Hyperlipidemia Brother     Cancer Maternal Aunt         Lung cancer    Cancer Paternal Aunt     Hyperlipidemia Other     Hypertension Other     Stroke Other     Kidney failure Other     Diabetes Other     Heart disease Other     Melanoma Other     Malig Hyperthermia Neg Hx        Review of Systems:     Constitutional:  Denies fever, shaking or chills   Eyes:  Denies change in visual acuity   HEENT:  Denies nasal  "congestion or sore throat   Respiratory:  Denies cough or shortness of breath   Cardiovascular:  Denies chest pain or edema   GI:  Denies abdominal pain, nausea, vomiting, bloody stools or diarrhea   Musculoskeletal:  Denies numbness, tingling or loss of motor function except as outlined above in history of present illness.  Integument:  Denies rash, lesion or ulceration   Neurologic:  Denies headache or focal weakness, deficits      All other pertinent positives and negatives as noted above in HPI.    Physical Exam: 78 y.o. female    Vitals:    04/26/24 1355   Temp: 98.6 °F (37 °C)   Weight: 80.9 kg (178 lb 4.8 oz)   Height: 166.4 cm (65.5\")     General:  Patient is awake and alert.  Appears in no acute distress or discomfort.    Psych:  Affect and demeanor are appropriate.    Eyes:  Conjunctivae and sclerae appear grossly normal.  Eyes track well and EOM seem to be intact.    Ears:  No gross abnormalities.  Hearing adequate for the exam.    Cardiovascular:  Regular rate and rhythm.    Lungs:  Good chest expansion.  Breathing unlabored.    Lymph:  No palpable adenopathy about neck or axilla.    Neck:  Supple.  Normal ROM.  Negative Spurling's for shoulder or arm pain.    Left upper extremity:  Skin benign and intact without evidence for swelling, masses or atrophy.  No palpable masses.  Moderate anterior tenderness.  Shoulder ROM limited and uncomfortable--120° FE, 25° ER, IR L2.  Crepitus noted with passive range of motion.  No evident instability or apprehension.  She appears to have good strength with resisted forward elevation, but her exam is somewhat guarded.  Deltoid fires on exam.  Good strength in the lower arm and hand.  Intact sensation throughout the arm and hand palpable radial pulse with brisk cap refill.    Diagnostic Tests:  Lab Results   Component Value Date    GLUCOSE 86 04/22/2024    CALCIUM 10.3 04/22/2024     04/22/2024    K 4.3 04/22/2024    CO2 29.3 (H) 04/22/2024     04/22/2024 "    BUN 13 04/22/2024    CREATININE 0.63 04/22/2024    EGFRIFAFRI 112 09/09/2021    EGFRIFNONA 99 11/12/2021    BCR 20.6 04/22/2024    ANIONGAP 10.7 04/22/2024     Lab Results   Component Value Date    WBC 6.02 04/22/2024    HGB 12.8 04/22/2024    HCT 39.2 04/22/2024    MCV 87.3 04/22/2024     04/22/2024     Lab Results   Component Value Date    INR 1.1 04/30/2014    INR 1.0 04/29/2014    PROTIME 12.4 04/30/2014    PROTIME 11.3 04/29/2014       Imaging:  AP, scapular Y, and axillary views of the left shoulder are ordered by myself and reviewed to evaluate the patient's complaint.  These are compared to previous x-rays.  The x-rays show severe glenohumeral osteoarthritis with bone on bone degeneration, subluxation of the humeral head, osteophyte formation, and subchondral sclerosis.  The acromiohumeral interval measures normal.  She has severe glenoid erosion with retroversion.    Assessment:  Left shoulder end-stage glenohumeral osteoarthritis    Plan: We had a thorough discussion regarding the risks, benefits and alternatives to an arthroplasty and non-surgical management versus surgery.  I explained that surgical risks include infection, hematoma, hardware related complications including failure of fixation, loosening, fracture, persistent pain and/or loss of motion, iatrogenic nerve and/or blood vessel injury resulting in permanent weakness, numbness or dysfunction, DVT, PE, positioning related neuropraxia, and anesthesia related complications resulting in death.  We discussed the indication for a reverse as opposed to a standard total shoulder and the risks inherent to the reverse including notching, glenoid loosening, instability, and traction related neuropraxia, any of which could result in persistent pain or problems requiring further surgery.  Lastly, we discussed the rehab and all that will be expected of the patient post operatively to ensure an optimal outcome.  The patient voiced understanding of  the risks, benefits, and alternative forms of treatment that were discussed and the patient consents to proceed with the reverse arthroplasty.      Patient is planning for hospital dismissal following surgery.  Denies history of DVT or metal allergy.    Rylee Reyes, APRN  04/26/24

## 2024-04-26 NOTE — H&P (VIEW-ONLY)
History & Physical         Patient: Sagrario Varma    YOB: 1945    Medical Record Number: [id number]    Chief Complaints:   Chief Complaint   Patient presents with    Left Shoulder - Pre-op Exam       History of Present Illness: 78 y.o. female comes in today for upcoming shoulder replacement surgery. Reports a long history of progressively worsening pain, motion loss, and dysfunction.  Describes current pain as severe.  Has failed prolonged conservative treatment.  Denies any new complaints or issues.    Allergies:   Allergies   Allergen Reactions    Penicillins Hives and Swelling    Morphine Nausea And Vomiting       Medications:   Home Medications:    Current Outpatient Medications:     Aflibercept (Eylea HD) 8 MG/0.07ML solution, EVERY 8-12- WEEKS, Disp: , Rfl:     Ascorbic Acid (VITAMIN C) 500 MG capsule, Take 1 capsule by mouth Daily. HOLDING FOR DOS, Disp: , Rfl:     aspirin (SB Low Dose ASA EC) 81 MG EC tablet, Take 1 tablet by mouth Daily. HOLDING FOR DOS, Disp: , Rfl:     atorvastatin (LIPITOR) 10 MG tablet, TAKE 1 TABLET DAILY (Patient taking differently: Take 1 tablet by mouth Every Night.), Disp: 90 tablet, Rfl: 3    benazepril-hydrochlorthiazide (LOTENSIN HCT) 20-12.5 MG per tablet, TAKE 1 TABLET DAILY (Patient taking differently: Take 1 tablet by mouth Daily.), Disp: 90 tablet, Rfl: 3    Calcium Carb-Cholecalciferol (CALCIUM 500 + D PO), Take 1 tablet by mouth Daily., Disp: , Rfl:     cetirizine (zyrTEC) 10 MG tablet, Take 1 tablet by mouth Daily., Disp: , Rfl:     Cholecalciferol (VITAMIN D) 1000 UNITS tablet, Take 1 tablet by mouth Daily. HOLDING FOR DOS, Disp: , Rfl:     Denosumab (PROLIA SC), Inject  under the skin Every 6 (Six) Months., Disp: , Rfl:     doxycycline (VIBRAMYCIN) 100 MG capsule, Take 1 capsule by mouth Daily., Disp: , Rfl:     metroNIDAZOLE (METROCREAM) 0.75 % cream, Apply 1 dose topically to the appropriate area as directed 2 (Two) Times a Day., Disp: , Rfl:      Multiple Vitamins-Minerals (MULTIVITAMIN ADULT PO), Take 1 tablet by mouth Daily. HOLDING FOR DOS, Disp: , Rfl:     multivitamins-minerals (PRESERVISION AREDS 2) capsule capsule, Take 1 capsule by mouth 2 (Two) Times a Day. HOLDING FOR DOS, Disp: , Rfl:     mupirocin (BACTROBAN) 2 % ointment, Apply 1 Application topically to the appropriate area as directed Every 12 (Twelve) Hours As Needed., Disp: , Rfl:     sulfacetamide sodium-sulfur (AVAR,PLEXION) 10-5 % topical emulsion, Apply  topically to the appropriate area as directed 2 (Two) Times a Day., Disp: , Rfl:     Synthroid 50 MCG tablet, TAKE 1 TABLET DAILY (Patient taking differently: Take 1 tablet by mouth Daily.), Disp: 90 tablet, Rfl: 3    acetaminophen (TYLENOL) 500 MG tablet, Take 1 tablet by mouth Every 6 (Six) Hours As Needed for Mild Pain. (Patient not taking: Reported on 4/26/2024), Disp: , Rfl:     Past Medical History:   Diagnosis Date    Age related osteoporosis 03/23/2021    Arthritis     Bilateral carotid artery stenosis     4-18-16    Breast cancer     Cancer     Cervical disc disorder Jul 2010    Narrowing of C5 & C7 noted    Class 1 obesity due to excess calories without serious comorbidity with body mass index (BMI) of 32.0 to 32.9 in adult 09/26/2023    Colon polyp     DCIS (ductal carcinoma in situ)     right    Fracture of wrist 1951    Left wrist    Fracture, clavicle Don't recall    Was told I had one    Fracture, humerus 1951    Left arm    Fracture, ulna 1951    Left arm    HBP (high blood pressure)     hypertension    Hypercholesterolemia     Hypertension     Hypothyroidism     Left shoulder pain     Macular degeneration     Multinodular goiter     of thyroid gland    Osteoarthritis     Periarthritis of shoulder     Saw Dr. Galeas for this previously.    PONV (postoperative nausea and vomiting)     Postoperative hypothyroidism 01/28/2020    Rosacea     Rotator cuff syndrome     Seasonal allergies     Thyroid nodule 12/23/2013     BENIGN          Past Surgical History:   Procedure Laterality Date    APPENDECTOMY  01/1961    BREAST LUMPECTOMY Right 05/2010    COLONOSCOPY  Feb 2022    No problems found    DILATATION AND CURETTAGE      x2 for excessive bleeding    ENDOSCOPY      EYE SURGERY Bilateral     CATARACTS    JOINT REPLACEMENT  Apr 2014    Right TKR    KNEE SURGERY      OVARIAN CYST SURGERY  01/1961    THYROIDECTOMY      WRIST SURGERY  1951          Social History     Occupational History    Occupation: [employer]     Employer: RETIRED   Tobacco Use    Smoking status: Never     Passive exposure: Never    Smokeless tobacco: Never   Vaping Use    Vaping status: Never Used   Substance and Sexual Activity    Alcohol use: Yes     Comment: SELDOM    Drug use: No    Sexual activity: Yes     Partners: Male     Birth control/protection: None      Social History     Social History Narrative    Retired, former retail and           Family History   Problem Relation Age of Onset    Stroke Mother 71    Hyperlipidemia Mother     Kidney disease Mother 71    Vision loss Mother     Diabetes Mother     Thrombosis Father     Hyperlipidemia Father     Clotting disorder Father 57    Breast cancer Sister 50        Metastatic    Cancer Sister         Metastatic breast cancer    Diabetes Sister         Likely due to cancer treatment    Cancer Sister         Breast cancer    Hyperlipidemia Sister     Melanoma Brother 41    Hodgkin's lymphoma Brother 41        non-Hodgkin lymphoma    Cancer Brother         NHL, Lymphoma, Leukemia    Hyperlipidemia Brother     Cancer Maternal Aunt         Lung cancer    Cancer Paternal Aunt     Hyperlipidemia Other     Hypertension Other     Stroke Other     Kidney failure Other     Diabetes Other     Heart disease Other     Melanoma Other     Malig Hyperthermia Neg Hx        Review of Systems:     Constitutional:  Denies fever, shaking or chills   Eyes:  Denies change in visual acuity   HEENT:  Denies nasal  "congestion or sore throat   Respiratory:  Denies cough or shortness of breath   Cardiovascular:  Denies chest pain or edema   GI:  Denies abdominal pain, nausea, vomiting, bloody stools or diarrhea   Musculoskeletal:  Denies numbness, tingling or loss of motor function except as outlined above in history of present illness.  Integument:  Denies rash, lesion or ulceration   Neurologic:  Denies headache or focal weakness, deficits      All other pertinent positives and negatives as noted above in HPI.    Physical Exam: 78 y.o. female    Vitals:    04/26/24 1355   Temp: 98.6 °F (37 °C)   Weight: 80.9 kg (178 lb 4.8 oz)   Height: 166.4 cm (65.5\")     General:  Patient is awake and alert.  Appears in no acute distress or discomfort.    Psych:  Affect and demeanor are appropriate.    Eyes:  Conjunctivae and sclerae appear grossly normal.  Eyes track well and EOM seem to be intact.    Ears:  No gross abnormalities.  Hearing adequate for the exam.    Cardiovascular:  Regular rate and rhythm.    Lungs:  Good chest expansion.  Breathing unlabored.    Lymph:  No palpable adenopathy about neck or axilla.    Neck:  Supple.  Normal ROM.  Negative Spurling's for shoulder or arm pain.    Left upper extremity:  Skin benign and intact without evidence for swelling, masses or atrophy.  No palpable masses.  Moderate anterior tenderness.  Shoulder ROM limited and uncomfortable--120° FE, 25° ER, IR L2.  Crepitus noted with passive range of motion.  No evident instability or apprehension.  She appears to have good strength with resisted forward elevation, but her exam is somewhat guarded.  Deltoid fires on exam.  Good strength in the lower arm and hand.  Intact sensation throughout the arm and hand palpable radial pulse with brisk cap refill.    Diagnostic Tests:  Lab Results   Component Value Date    GLUCOSE 86 04/22/2024    CALCIUM 10.3 04/22/2024     04/22/2024    K 4.3 04/22/2024    CO2 29.3 (H) 04/22/2024     04/22/2024 "    BUN 13 04/22/2024    CREATININE 0.63 04/22/2024    EGFRIFAFRI 112 09/09/2021    EGFRIFNONA 99 11/12/2021    BCR 20.6 04/22/2024    ANIONGAP 10.7 04/22/2024     Lab Results   Component Value Date    WBC 6.02 04/22/2024    HGB 12.8 04/22/2024    HCT 39.2 04/22/2024    MCV 87.3 04/22/2024     04/22/2024     Lab Results   Component Value Date    INR 1.1 04/30/2014    INR 1.0 04/29/2014    PROTIME 12.4 04/30/2014    PROTIME 11.3 04/29/2014       Imaging:  AP, scapular Y, and axillary views of the left shoulder are ordered by myself and reviewed to evaluate the patient's complaint.  These are compared to previous x-rays.  The x-rays show severe glenohumeral osteoarthritis with bone on bone degeneration, subluxation of the humeral head, osteophyte formation, and subchondral sclerosis.  The acromiohumeral interval measures normal.  She has severe glenoid erosion with retroversion.    Assessment:  Left shoulder end-stage glenohumeral osteoarthritis    Plan: We had a thorough discussion regarding the risks, benefits and alternatives to an arthroplasty and non-surgical management versus surgery.  I explained that surgical risks include infection, hematoma, hardware related complications including failure of fixation, loosening, fracture, persistent pain and/or loss of motion, iatrogenic nerve and/or blood vessel injury resulting in permanent weakness, numbness or dysfunction, DVT, PE, positioning related neuropraxia, and anesthesia related complications resulting in death.  We discussed the indication for a reverse as opposed to a standard total shoulder and the risks inherent to the reverse including notching, glenoid loosening, instability, and traction related neuropraxia, any of which could result in persistent pain or problems requiring further surgery.  Lastly, we discussed the rehab and all that will be expected of the patient post operatively to ensure an optimal outcome.  The patient voiced understanding of  the risks, benefits, and alternative forms of treatment that were discussed and the patient consents to proceed with the reverse arthroplasty.      Patient is planning for hospital dismissal following surgery.  Denies history of DVT or metal allergy.    Rylee Reyes, APRN  04/26/24

## 2024-05-02 ENCOUNTER — TELEPHONE (OUTPATIENT)
Dept: ORTHOPEDIC SURGERY | Facility: HOSPITAL | Age: 79
End: 2024-05-02
Payer: MEDICARE

## 2024-05-02 NOTE — TELEPHONE ENCOUNTER
Risk Factor yes no   Age >75 X    BMI <20 >40  X   Patient History     Chronic Pain (2 or more meds/Pain Management)  X   ETOH (more than 3 drinks Daily)  X   Uncontrolled Depression/Bipolar/Schizoaffective Disorder  X   Arrhythmias--  X   Stent placement/MI  X   DVT/PE  X   Pacemaker  X   HTN (uncontrolled or requiring more than 2 medications)  X   CHF/Retained fluids/Edema  X   Stroke with Residual   X   COPD/Asthma  X   JAIRO--Non-compliant with CPAP  X   Diabetes (on insulin or more than 2 meds)         A1C:  X   BPH/Urinary retention (on medication)  X   CKD  X   Home environment and support     Current ambulation status (use of cane, walker, W/C, Multiple falls/weakness)  X   Stairs to enter and throughout home X    Lives Alone  X   Doesn't have support at home  X   Outpatient Screening Assessment    Home needs: (Walker/BSC):  Total Shoulder  ? Steps 1 steps  Caregiver 24-48hrs post-discharge:     Discharge Plan:   Total Shoulder     Prescriptions: Meds to bed    Home medications:   ? Blood thinner/anti-coag therapy--ASA   [] BPH or diuretic--  ? BP meds-- Lotensin   [] Pain/Anti-inflammatories--   Pre-op Education:  Educate patient on spinal anesthesia/pain control:  ? patient verbalize understanding    Educate patient on hospital course/timeline:  ?  patient verbalize understanding    Joint Care Class:  ?  yes [] no  Notes:

## 2024-05-09 ENCOUNTER — HOSPITAL ENCOUNTER (OUTPATIENT)
Facility: HOSPITAL | Age: 79
Discharge: HOME | End: 2024-05-09
Attending: ORTHOPAEDIC SURGERY | Admitting: ORTHOPAEDIC SURGERY
Payer: MEDICARE

## 2024-05-09 ENCOUNTER — ANESTHESIA (OUTPATIENT)
Dept: PERIOP | Facility: HOSPITAL | Age: 79
End: 2024-05-09
Payer: MEDICARE

## 2024-05-09 ENCOUNTER — APPOINTMENT (OUTPATIENT)
Dept: GENERAL RADIOLOGY | Facility: HOSPITAL | Age: 79
End: 2024-05-09
Payer: MEDICARE

## 2024-05-09 ENCOUNTER — ANESTHESIA EVENT (OUTPATIENT)
Dept: PERIOP | Facility: HOSPITAL | Age: 79
End: 2024-05-09
Payer: MEDICARE

## 2024-05-09 VITALS
OXYGEN SATURATION: 99 % | SYSTOLIC BLOOD PRESSURE: 130 MMHG | DIASTOLIC BLOOD PRESSURE: 77 MMHG | RESPIRATION RATE: 18 BRPM | TEMPERATURE: 97.4 F | HEART RATE: 70 BPM

## 2024-05-09 DIAGNOSIS — Z96.612 S/P REVERSE TOTAL SHOULDER ARTHROPLASTY, LEFT: Primary | ICD-10-CM

## 2024-05-09 DIAGNOSIS — M19.019 ARTHRITIS OF SHOULDER: ICD-10-CM

## 2024-05-09 PROCEDURE — 97166 OT EVAL MOD COMPLEX 45 MIN: CPT

## 2024-05-09 PROCEDURE — 25010000002 VANCOMYCIN HCL 1.25 G RECONSTITUTED SOLUTION 1 EACH VIAL: Performed by: ORTHOPAEDIC SURGERY

## 2024-05-09 PROCEDURE — 25010000002 SUGAMMADEX 200 MG/2ML SOLUTION

## 2024-05-09 PROCEDURE — 23472 RECONSTRUCT SHOULDER JOINT: CPT | Performed by: ORTHOPAEDIC SURGERY

## 2024-05-09 PROCEDURE — 63710000001 POVIDONE-IODINE 10 % SOLUTION 14.8 ML BOTTLE: Performed by: ORTHOPAEDIC SURGERY

## 2024-05-09 PROCEDURE — 73020 X-RAY EXAM OF SHOULDER: CPT

## 2024-05-09 PROCEDURE — 25010000002 ONDANSETRON PER 1 MG

## 2024-05-09 PROCEDURE — 25010000002 FENTANYL CITRATE (PF) 50 MCG/ML SOLUTION: Performed by: STUDENT IN AN ORGANIZED HEALTH CARE EDUCATION/TRAINING PROGRAM

## 2024-05-09 PROCEDURE — 25010000002 CEFAZOLIN PER 500 MG: Performed by: ORTHOPAEDIC SURGERY

## 2024-05-09 PROCEDURE — A9270 NON-COVERED ITEM OR SERVICE: HCPCS | Performed by: ORTHOPAEDIC SURGERY

## 2024-05-09 PROCEDURE — 63710000001 ACETAMINOPHEN EXTRA STRENGTH 500 MG TABLET: Performed by: ORTHOPAEDIC SURGERY

## 2024-05-09 PROCEDURE — 25010000002 DEXAMETHASONE SODIUM PHOSPHATE 20 MG/5ML SOLUTION

## 2024-05-09 PROCEDURE — 25010000002 BUPIVACAINE (PF) 0.5 % SOLUTION: Performed by: STUDENT IN AN ORGANIZED HEALTH CARE EDUCATION/TRAINING PROGRAM

## 2024-05-09 PROCEDURE — 25010000002 PROPOFOL 200 MG/20ML EMULSION

## 2024-05-09 PROCEDURE — 25810000003 SODIUM CHLORIDE 0.9 % SOLUTION 250 ML FLEX CONT: Performed by: ORTHOPAEDIC SURGERY

## 2024-05-09 PROCEDURE — 25010000002 MIDAZOLAM PER 1 MG: Performed by: STUDENT IN AN ORGANIZED HEALTH CARE EDUCATION/TRAINING PROGRAM

## 2024-05-09 PROCEDURE — 20610 DRAIN/INJ JOINT/BURSA W/O US: CPT | Performed by: ORTHOPAEDIC SURGERY

## 2024-05-09 PROCEDURE — 25010000002 BUPIVACAINE (PF) 0.5 % SOLUTION 10 ML VIAL: Performed by: ORTHOPAEDIC SURGERY

## 2024-05-09 PROCEDURE — 25010000002 PROPOFOL 10 MG/ML EMULSION

## 2024-05-09 PROCEDURE — C1776 JOINT DEVICE (IMPLANTABLE): HCPCS | Performed by: ORTHOPAEDIC SURGERY

## 2024-05-09 PROCEDURE — 63710000001 MELOXICAM 15 MG TABLET: Performed by: ORTHOPAEDIC SURGERY

## 2024-05-09 PROCEDURE — 25010000002 GLYCOPYRROLATE 0.2 MG/ML SOLUTION

## 2024-05-09 PROCEDURE — 0 BUPIVACAINE LIPOSOME 1.3 % SUSPENSION: Performed by: STUDENT IN AN ORGANIZED HEALTH CARE EDUCATION/TRAINING PROGRAM

## 2024-05-09 PROCEDURE — C9290 INJ, BUPIVACAINE LIPOSOME: HCPCS | Performed by: STUDENT IN AN ORGANIZED HEALTH CARE EDUCATION/TRAINING PROGRAM

## 2024-05-09 PROCEDURE — 25010000002 METHYLPREDNISOLONE PER 80 MG: Performed by: ORTHOPAEDIC SURGERY

## 2024-05-09 PROCEDURE — 97535 SELF CARE MNGMENT TRAINING: CPT

## 2024-05-09 DEVICE — SCRW COMPRNSV CNTRL 6.5X25MM REUS: Type: IMPLANTABLE DEVICE | Site: SHOULDER | Status: FUNCTIONAL

## 2024-05-09 DEVICE — TOTL SHLDER REV: Type: IMPLANTABLE DEVICE | Status: FUNCTIONAL

## 2024-05-09 DEVICE — GLENOSPHERE VERSA DIAL FIX STD 36MM: Type: IMPLANTABLE DEVICE | Site: SHOULDER | Status: FUNCTIONAL

## 2024-05-09 DEVICE — DEV CONTRL TISS STRATAFIX SPIRAL MNCRYL UD 3/0 PLS 30CM: Type: IMPLANTABLE DEVICE | Site: SHOULDER | Status: FUNCTIONAL

## 2024-05-09 DEVICE — SCRW FIX LK HEX 4.75X20MM: Type: IMPLANTABLE DEVICE | Site: SHOULDER | Status: FUNCTIONAL

## 2024-05-09 DEVICE — SUT NONABS MAXBRAID/PE NMBR2 C7 38IN BLU 900335: Type: IMPLANTABLE DEVICE | Site: SHOULDER | Status: FUNCTIONAL

## 2024-05-09 DEVICE — TRY HUM/SHLDR COMPREHENSIVE/REVERSE MINI COCR STD PLS6: Type: IMPLANTABLE DEVICE | Site: SHOULDER | Status: FUNCTIONAL

## 2024-05-09 DEVICE — BEAR HUM VIT/E STD 36MM: Type: IMPLANTABLE DEVICE | Site: SHOULDER | Status: FUNCTIONAL

## 2024-05-09 DEVICE — BASE PLT AUG W/ADAPT MD: Type: IMPLANTABLE DEVICE | Site: SHOULDER | Status: FUNCTIONAL

## 2024-05-09 DEVICE — SCRW FIX LK HEX 4.75X25MM: Type: IMPLANTABLE DEVICE | Site: SHOULDER | Status: FUNCTIONAL

## 2024-05-09 DEVICE — SCRW FIX LK HEX 4.75X15MM: Type: IMPLANTABLE DEVICE | Site: SHOULDER | Status: FUNCTIONAL

## 2024-05-09 DEVICE — STEM HUM/SHLDR COMPREHENSIVE MINI 8X83MM: Type: IMPLANTABLE DEVICE | Site: SHOULDER | Status: FUNCTIONAL

## 2024-05-09 RX ORDER — LIDOCAINE HYDROCHLORIDE 20 MG/ML
INJECTION, SOLUTION EPIDURAL; INFILTRATION; INTRACAUDAL; PERINEURAL AS NEEDED
Status: DISCONTINUED | OUTPATIENT
Start: 2024-05-09 | End: 2024-05-09 | Stop reason: SURG

## 2024-05-09 RX ORDER — FENTANYL CITRATE 50 UG/ML
50 INJECTION, SOLUTION INTRAMUSCULAR; INTRAVENOUS ONCE AS NEEDED
Status: COMPLETED | OUTPATIENT
Start: 2024-05-09 | End: 2024-05-09

## 2024-05-09 RX ORDER — FENTANYL CITRATE 50 UG/ML
25 INJECTION, SOLUTION INTRAMUSCULAR; INTRAVENOUS
Status: DISCONTINUED | OUTPATIENT
Start: 2024-05-09 | End: 2024-05-09 | Stop reason: HOSPADM

## 2024-05-09 RX ORDER — ONDANSETRON 4 MG/1
4 TABLET, FILM COATED ORAL EVERY 8 HOURS PRN
Qty: 12 TABLET | Refills: 0 | Status: SHIPPED | OUTPATIENT
Start: 2024-05-09

## 2024-05-09 RX ORDER — NALOXONE HCL 0.4 MG/ML
0.2 VIAL (ML) INJECTION AS NEEDED
Status: DISCONTINUED | OUTPATIENT
Start: 2024-05-09 | End: 2024-05-09 | Stop reason: HOSPADM

## 2024-05-09 RX ORDER — HYDROCODONE BITARTRATE AND ACETAMINOPHEN 7.5; 325 MG/1; MG/1
1 TABLET ORAL EVERY 4 HOURS PRN
Status: DISCONTINUED | OUTPATIENT
Start: 2024-05-09 | End: 2024-05-09 | Stop reason: HOSPADM

## 2024-05-09 RX ORDER — PHENYLEPHRINE HCL IN 0.9% NACL 1 MG/10 ML
SYRINGE (ML) INTRAVENOUS AS NEEDED
Status: DISCONTINUED | OUTPATIENT
Start: 2024-05-09 | End: 2024-05-09 | Stop reason: SURG

## 2024-05-09 RX ORDER — LIDOCAINE HYDROCHLORIDE 10 MG/ML
0.5 INJECTION, SOLUTION INFILTRATION; PERINEURAL ONCE AS NEEDED
Status: DISCONTINUED | OUTPATIENT
Start: 2024-05-09 | End: 2024-05-09 | Stop reason: HOSPADM

## 2024-05-09 RX ORDER — PROPOFOL 10 MG/ML
INJECTION, EMULSION INTRAVENOUS AS NEEDED
Status: DISCONTINUED | OUTPATIENT
Start: 2024-05-09 | End: 2024-05-09 | Stop reason: SURG

## 2024-05-09 RX ORDER — MELOXICAM 15 MG/1
15 TABLET ORAL ONCE
Status: COMPLETED | OUTPATIENT
Start: 2024-05-09 | End: 2024-05-09

## 2024-05-09 RX ORDER — LABETALOL HYDROCHLORIDE 5 MG/ML
5 INJECTION, SOLUTION INTRAVENOUS
Status: DISCONTINUED | OUTPATIENT
Start: 2024-05-09 | End: 2024-05-09 | Stop reason: HOSPADM

## 2024-05-09 RX ORDER — DROPERIDOL 2.5 MG/ML
0.62 INJECTION, SOLUTION INTRAMUSCULAR; INTRAVENOUS
Status: DISCONTINUED | OUTPATIENT
Start: 2024-05-09 | End: 2024-05-09 | Stop reason: HOSPADM

## 2024-05-09 RX ORDER — MAGNESIUM HYDROXIDE 1200 MG/15ML
LIQUID ORAL AS NEEDED
Status: DISCONTINUED | OUTPATIENT
Start: 2024-05-09 | End: 2024-05-09 | Stop reason: HOSPADM

## 2024-05-09 RX ORDER — MIDAZOLAM HYDROCHLORIDE 1 MG/ML
0.5 INJECTION INTRAMUSCULAR; INTRAVENOUS
Status: DISCONTINUED | OUTPATIENT
Start: 2024-05-09 | End: 2024-05-09 | Stop reason: HOSPADM

## 2024-05-09 RX ORDER — HYDROCODONE BITARTRATE AND ACETAMINOPHEN 7.5; 325 MG/1; MG/1
2 TABLET ORAL ONCE AS NEEDED
Status: CANCELLED | OUTPATIENT
Start: 2024-05-09 | End: 2024-05-16

## 2024-05-09 RX ORDER — PROMETHAZINE HYDROCHLORIDE 25 MG/1
25 SUPPOSITORY RECTAL ONCE AS NEEDED
Status: DISCONTINUED | OUTPATIENT
Start: 2024-05-09 | End: 2024-05-09 | Stop reason: HOSPADM

## 2024-05-09 RX ORDER — ONDANSETRON 2 MG/ML
4 INJECTION INTRAMUSCULAR; INTRAVENOUS ONCE AS NEEDED
Status: DISCONTINUED | OUTPATIENT
Start: 2024-05-09 | End: 2024-05-09 | Stop reason: HOSPADM

## 2024-05-09 RX ORDER — DIPHENHYDRAMINE HYDROCHLORIDE 50 MG/ML
12.5 INJECTION INTRAMUSCULAR; INTRAVENOUS
Status: DISCONTINUED | OUTPATIENT
Start: 2024-05-09 | End: 2024-05-09 | Stop reason: HOSPADM

## 2024-05-09 RX ORDER — SODIUM CHLORIDE 0.9 % (FLUSH) 0.9 %
3 SYRINGE (ML) INJECTION EVERY 12 HOURS SCHEDULED
Status: DISCONTINUED | OUTPATIENT
Start: 2024-05-09 | End: 2024-05-09 | Stop reason: HOSPADM

## 2024-05-09 RX ORDER — SODIUM CHLORIDE, SODIUM LACTATE, POTASSIUM CHLORIDE, CALCIUM CHLORIDE 600; 310; 30; 20 MG/100ML; MG/100ML; MG/100ML; MG/100ML
9 INJECTION, SOLUTION INTRAVENOUS CONTINUOUS
Status: DISCONTINUED | OUTPATIENT
Start: 2024-05-09 | End: 2024-05-09 | Stop reason: HOSPADM

## 2024-05-09 RX ORDER — PREGABALIN 75 MG/1
150 CAPSULE ORAL ONCE
Status: DISCONTINUED | OUTPATIENT
Start: 2024-05-09 | End: 2024-05-09

## 2024-05-09 RX ORDER — PROMETHAZINE HYDROCHLORIDE 25 MG/1
25 TABLET ORAL ONCE AS NEEDED
Status: DISCONTINUED | OUTPATIENT
Start: 2024-05-09 | End: 2024-05-09 | Stop reason: HOSPADM

## 2024-05-09 RX ORDER — ONDANSETRON 4 MG/1
4 TABLET, ORALLY DISINTEGRATING ORAL ONCE AS NEEDED
Status: CANCELLED | OUTPATIENT
Start: 2024-05-09

## 2024-05-09 RX ORDER — IPRATROPIUM BROMIDE AND ALBUTEROL SULFATE 2.5; .5 MG/3ML; MG/3ML
3 SOLUTION RESPIRATORY (INHALATION) ONCE AS NEEDED
Status: DISCONTINUED | OUTPATIENT
Start: 2024-05-09 | End: 2024-05-09 | Stop reason: HOSPADM

## 2024-05-09 RX ORDER — ACETAMINOPHEN 325 MG/1
650 TABLET ORAL 2 TIMES DAILY PRN
Qty: 60 TABLET | Refills: 0 | Status: SHIPPED | OUTPATIENT
Start: 2024-05-09

## 2024-05-09 RX ORDER — GLYCOPYRROLATE 0.2 MG/ML
INJECTION INTRAMUSCULAR; INTRAVENOUS AS NEEDED
Status: DISCONTINUED | OUTPATIENT
Start: 2024-05-09 | End: 2024-05-09 | Stop reason: SURG

## 2024-05-09 RX ORDER — ONDANSETRON 2 MG/ML
4 INJECTION INTRAMUSCULAR; INTRAVENOUS ONCE AS NEEDED
Status: CANCELLED | OUTPATIENT
Start: 2024-05-09

## 2024-05-09 RX ORDER — DEXAMETHASONE SODIUM PHOSPHATE 4 MG/ML
INJECTION, SOLUTION INTRA-ARTICULAR; INTRALESIONAL; INTRAMUSCULAR; INTRAVENOUS; SOFT TISSUE AS NEEDED
Status: DISCONTINUED | OUTPATIENT
Start: 2024-05-09 | End: 2024-05-09 | Stop reason: SURG

## 2024-05-09 RX ORDER — EPHEDRINE SULFATE 50 MG/ML
5 INJECTION, SOLUTION INTRAVENOUS ONCE AS NEEDED
Status: DISCONTINUED | OUTPATIENT
Start: 2024-05-09 | End: 2024-05-09 | Stop reason: HOSPADM

## 2024-05-09 RX ORDER — HYDROCODONE BITARTRATE AND ACETAMINOPHEN 5; 325 MG/1; MG/1
1 TABLET ORAL ONCE AS NEEDED
Status: DISCONTINUED | OUTPATIENT
Start: 2024-05-09 | End: 2024-05-09 | Stop reason: HOSPADM

## 2024-05-09 RX ORDER — SODIUM CHLORIDE 0.9 % (FLUSH) 0.9 %
3-10 SYRINGE (ML) INJECTION AS NEEDED
Status: DISCONTINUED | OUTPATIENT
Start: 2024-05-09 | End: 2024-05-09 | Stop reason: HOSPADM

## 2024-05-09 RX ORDER — BUPIVACAINE HYDROCHLORIDE 5 MG/ML
INJECTION, SOLUTION EPIDURAL; INTRACAUDAL
Status: COMPLETED | OUTPATIENT
Start: 2024-05-09 | End: 2024-05-09

## 2024-05-09 RX ORDER — HYDROMORPHONE HYDROCHLORIDE 1 MG/ML
0.25 INJECTION, SOLUTION INTRAMUSCULAR; INTRAVENOUS; SUBCUTANEOUS
Status: DISCONTINUED | OUTPATIENT
Start: 2024-05-09 | End: 2024-05-09 | Stop reason: HOSPADM

## 2024-05-09 RX ORDER — TRANEXAMIC ACID 100 MG/ML
INJECTION, SOLUTION INTRAVENOUS AS NEEDED
Status: DISCONTINUED | OUTPATIENT
Start: 2024-05-09 | End: 2024-05-09 | Stop reason: SURG

## 2024-05-09 RX ORDER — HYDRALAZINE HYDROCHLORIDE 20 MG/ML
5 INJECTION INTRAMUSCULAR; INTRAVENOUS
Status: DISCONTINUED | OUTPATIENT
Start: 2024-05-09 | End: 2024-05-09 | Stop reason: HOSPADM

## 2024-05-09 RX ORDER — ACETAMINOPHEN 325 MG/1
1000 TABLET ORAL ONCE
Status: COMPLETED | OUTPATIENT
Start: 2024-05-09 | End: 2024-05-09

## 2024-05-09 RX ORDER — ONDANSETRON 2 MG/ML
INJECTION INTRAMUSCULAR; INTRAVENOUS AS NEEDED
Status: DISCONTINUED | OUTPATIENT
Start: 2024-05-09 | End: 2024-05-09 | Stop reason: SURG

## 2024-05-09 RX ORDER — ROCURONIUM BROMIDE 10 MG/ML
INJECTION, SOLUTION INTRAVENOUS AS NEEDED
Status: DISCONTINUED | OUTPATIENT
Start: 2024-05-09 | End: 2024-05-09 | Stop reason: SURG

## 2024-05-09 RX ORDER — FLUMAZENIL 0.1 MG/ML
0.2 INJECTION INTRAVENOUS AS NEEDED
Status: DISCONTINUED | OUTPATIENT
Start: 2024-05-09 | End: 2024-05-09 | Stop reason: HOSPADM

## 2024-05-09 RX ORDER — HYDROCODONE BITARTRATE AND ACETAMINOPHEN 7.5; 325 MG/1; MG/1
1 TABLET ORAL EVERY 4 HOURS PRN
Qty: 30 TABLET | Refills: 0 | Status: SHIPPED | OUTPATIENT
Start: 2024-05-09

## 2024-05-09 RX ORDER — DOCUSATE SODIUM 100 MG/1
100 CAPSULE, LIQUID FILLED ORAL 2 TIMES DAILY
Qty: 60 CAPSULE | Refills: 0 | Status: SHIPPED | OUTPATIENT
Start: 2024-05-09

## 2024-05-09 RX ADMIN — LIDOCAINE HYDROCHLORIDE 80 MG: 20 INJECTION, SOLUTION EPIDURAL; INFILTRATION; INTRACAUDAL; PERINEURAL at 07:13

## 2024-05-09 RX ADMIN — Medication 100 MCG: at 07:32

## 2024-05-09 RX ADMIN — FENTANYL CITRATE 50 MCG: 50 INJECTION, SOLUTION INTRAMUSCULAR; INTRAVENOUS at 06:26

## 2024-05-09 RX ADMIN — Medication 100 MCG: at 08:12

## 2024-05-09 RX ADMIN — ONDANSETRON 4 MG: 2 INJECTION INTRAMUSCULAR; INTRAVENOUS at 07:18

## 2024-05-09 RX ADMIN — MELOXICAM 15 MG: 15 TABLET ORAL at 05:46

## 2024-05-09 RX ADMIN — VANCOMYCIN HYDROCHLORIDE 1250 MG: 1.25 INJECTION, POWDER, LYOPHILIZED, FOR SOLUTION INTRAVENOUS at 05:51

## 2024-05-09 RX ADMIN — Medication 200 MCG: at 08:02

## 2024-05-09 RX ADMIN — ACETAMINOPHEN 975 MG: 500 TABLET ORAL at 05:47

## 2024-05-09 RX ADMIN — ROCURONIUM BROMIDE 50 MG: 10 INJECTION, SOLUTION INTRAVENOUS at 07:13

## 2024-05-09 RX ADMIN — Medication 100 MCG: at 07:52

## 2024-05-09 RX ADMIN — SODIUM CHLORIDE 2 G: 900 INJECTION INTRAVENOUS at 06:51

## 2024-05-09 RX ADMIN — TRANEXAMIC ACID 1000 MG: 100 INJECTION, SOLUTION INTRAVENOUS at 07:37

## 2024-05-09 RX ADMIN — GLYCOPYRROLATE 0.2 MG: 0.2 INJECTION INTRAMUSCULAR; INTRAVENOUS at 08:03

## 2024-05-09 RX ADMIN — SUGAMMADEX 200 MG: 100 INJECTION, SOLUTION INTRAVENOUS at 08:41

## 2024-05-09 RX ADMIN — MIDAZOLAM 2 MG: 1 INJECTION INTRAMUSCULAR; INTRAVENOUS at 06:27

## 2024-05-09 RX ADMIN — Medication 100 MCG: at 08:19

## 2024-05-09 RX ADMIN — BUPIVACAINE HYDROCHLORIDE 15 ML: 5 INJECTION, SOLUTION EPIDURAL; INTRACAUDAL; PERINEURAL at 06:30

## 2024-05-09 RX ADMIN — PROPOFOL 150 MCG/KG/MIN: 10 INJECTION, EMULSION INTRAVENOUS at 07:15

## 2024-05-09 RX ADMIN — PROPOFOL 150 MG: 10 INJECTION, EMULSION INTRAVENOUS at 07:13

## 2024-05-09 RX ADMIN — DEXAMETHASONE SODIUM PHOSPHATE 8 MG: 4 INJECTION, SOLUTION INTRAMUSCULAR; INTRAVENOUS at 07:18

## 2024-05-09 RX ADMIN — Medication 200 MCG: at 07:35

## 2024-05-09 RX ADMIN — BUPIVACAINE 10 ML: 13.3 INJECTION, SUSPENSION, LIPOSOMAL INFILTRATION at 06:30

## 2024-05-09 NOTE — BRIEF OP NOTE
TOTAL SHOULDER REVERSE ARTHROPLASTY  Progress Note    Sagrario Varma  5/9/2024    Pre-op Diagnosis:   Arthritis of shoulder [M19.019]       Post-Op Diagnosis Codes:     * Arthritis of shoulder [M19.019]    Procedure/CPT® Codes:        Procedure(s):  1.  LEFT TOTAL SHOULDER REVERSE ARTHROPLASTY  2.  Right shoulder injection        SURGICAL APPROACH: Deltopectoral    SURGICAL TECHNIQUE: Tenotomy      Surgeon(s):  Juan Galeas MD    Anesthesia: General with Block    Staff:   Circulator: Glenys Smith RN  Scrub Person: Riri Gerardo Stephanie A  Vendor Representative: Bimal Gregg  Assistant: Rose Javier CSA  Assistant: Rose Javier CSA      Estimated Blood Loss: 100 mL    Urine Voided: * No values recorded between 5/9/2024  6:58 AM and 5/9/2024  8:22 AM *    Specimens:                None          Drains: * No LDAs found *    Findings: see dictation        Complications: none    Assistant: Rose Javier CSA  was responsible for performing the following activities: Retraction and their skilled assistance was necessary for the success of this case.    Juan Galeas MD     Date: 5/9/2024  Time: 08:22 EDT

## 2024-05-09 NOTE — PLAN OF CARE
Goal Outcome Evaluation:  Plan of Care Reviewed With: patient, family           Outcome Evaluation: Pt seen for OT eval this AM. S/p L reverse total shoulder arthroplasty and R shoulder intra-articular cortisone injection. Pt demo understanding of sling management, UB and LB dressing one handed technique and shoulder precautions. She lives with her  who is able to assist her as needed. Pt donned shirt that had snaps easily allowing her to don shirt with affected extremity and  assisted with snaps. She ambulated to the bathroom with supervision and voided and was able to perform nilsa care with RUE. Required min A to pull up pants on L side. Pt plans to dc home with  assist as needed and outpatient therapy.      Anticipated Discharge Disposition (OT): home, home with assist, home with outpatient therapy services

## 2024-05-09 NOTE — THERAPY EVALUATION
Patient Name: Sagrario Varma  : 1945    MRN: [id number]                              Today's Date: 2024       Admit Date: 2024    Visit Dx:     ICD-10-CM ICD-9-CM   1. S/P reverse total shoulder arthroplasty, left  Z96.612 V43.61   2. Arthritis of shoulder  M19.019 716.91     Patient Active Problem List   Diagnosis    Generalized osteoarthritis    Mixed hyperlipidemia    Essential hypertension    Non-toxic multinodular goiter    Impaired glucose tolerance    Vitamin D deficiency    Ductal carcinoma in situ (DCIS) of breast    Family history of non-Hodgkin's lymphoma    Family history of Hodgkin's lymphoma    Family history of breast cancer    Adhesive capsulitis of left shoulder    Macular degeneration of both eyes    Postoperative hypothyroidism    Age related osteoporosis    Screening mammogram, encounter for     Mass of axillary tail of left breast     Class 1 obesity due to excess calories without serious comorbidity with body mass index (BMI) of 32.0 to 32.9 in adult    Shoulder arthritis    Arthritis of shoulder     Past Medical History:   Diagnosis Date    Age related osteoporosis 2021    Arthritis     Bilateral carotid artery stenosis     17    Breast cancer     Cancer     Cervical disc disorder 2010    Narrowing of C5 & C7 noted    Class 1 obesity due to excess calories without serious comorbidity with body mass index (BMI) of 32.0 to 32.9 in adult 2023    Colon polyp     DCIS (ductal carcinoma in situ)     right    Fracture of wrist     Left wrist    Fracture, clavicle Don't recall    Was told I had one    Fracture, humerus     Left arm    Fracture, ulna     Left arm    HBP (high blood pressure)     hypertension    Hypercholesterolemia     Hypertension     Hypothyroidism     Left shoulder pain     Macular degeneration     Multinodular goiter     of thyroid gland    Osteoarthritis     Periarthritis of shoulder     Saw Dr. Galeas for this previously.     PONV (postoperative nausea and vomiting)     Postoperative hypothyroidism 01/28/2020    Rosacea     Rotator cuff syndrome     Seasonal allergies     Thyroid nodule 12/23/2013    BENIGN     Past Surgical History:   Procedure Laterality Date    APPENDECTOMY  01/1961    BREAST LUMPECTOMY Right 05/2010    COLONOSCOPY  Feb 2022    No problems found    DILATATION AND CURETTAGE      x2 for excessive bleeding    ENDOSCOPY      EYE SURGERY Bilateral     CATARACTS    JOINT REPLACEMENT  Apr 2014    Right TKR    KNEE SURGERY      OVARIAN CYST SURGERY  01/1961    THYROIDECTOMY      WRIST SURGERY  1951      General Information       Row Name 05/09/24 1024          OT Time and Intention    Document Type evaluation  -     Mode of Treatment individual therapy;occupational therapy  -[facility] Name 05/09/24 1024          General Information    Patient Profile Reviewed yes  -CRISS     Prior Level of Function independent:  -     Existing Precautions/Restrictions shoulder;weight bearing  NWB L shoulder  -[facility] Name 05/09/24 1024          Living Environment    People in Home spouse  -[facility] Name 05/09/24 1024          Home Main Entrance    Number of Stairs, Main Entrance --  1 step up  -[facility] Name 05/09/24 1024          Stairs Within Home, Primary    Number of Stairs, Within Home, Primary none  -[facility] Name 05/09/24 1024          Cognition    Orientation Status (Cognition) oriented x 4  -[facility] Name 05/09/24 1024          Safety Issues, Functional Mobility    Impairments Affecting Function (Mobility) endurance/activity tolerance;strength;range of motion (ROM)  -               User Key  (r) = Recorded By, (t) = Taken By, (c) = Cosigned By      Initials Name Provider Type    Diana Ibarra OT Occupational Therapist                     Mobility/ADL's       Row Name 05/09/24 1026          Bed Mobility    Bed Mobility bed mobility (all) activities  -     All Activities, [place] (Bed Mobility)  supervision  -KA       Row Name 05/09/24 1026          Transfers    Transfers sit-stand transfer;toilet transfer  -KA       Row Name 05/09/24 1026          Sit-Stand Transfer    Sit-Stand [place] (Transfers) supervision  -     Comment, (Sit-Stand Transfer) no AD  -KA       Row Name 05/09/24 1026          Toilet Transfer    Type (Toilet Transfer) sit-stand;stand-sit  -     [place] Level (Toilet Transfer) supervision  -KA       Row Name 05/09/24 1026          Functional Mobility    Functional Mobility- Ind. Level supervision required  -     Functional Mobility- Device --  no AD  -     Functional Mobility-Distance (Feet) --  to and from bathroom in room  -[facility] 05/09/24 1026          Activities of Daily Living    BADL Assessment/Intervention upper body dressing;lower body dressing;toileting  -KA       Row Name 05/09/24 1026          Mobility    Extremity Weight-bearing Status left upper extremity  -     Left Upper Extremity (Weight-bearing Status) non weight-bearing (NWB)  -KA       Row Name 05/09/24 1026          Upper Body Dressing Assessment/Training    [place] Level (Upper Body Dressing) upper body dressing skills;doff;don;minimum assist (75% patient effort)  -     Position (Upper Body Dressing) supported sitting  -     Comment, (Upper Body Dressing) educated on cali dressing techniques. pt had shirt with snaps that made it easy for affected UE and  assisted with snaps  -KA       Row Name 05/09/24 1026          Lower Body Dressing Assessment/Training    [place] Level (Lower Body Dressing) lower body dressing skills;doff;don;minimum assist (75% patient effort)  -     Comment, (Lower Body Dressing) min A to pull up pants on L side  -KA       Row Name 05/09/24 1026          Toileting Assessment/Training    [place] Level (Toileting) toileting skills;adjust/manage clothing;change pad/brief;perform perineal hygiene;minimum assist (75% patient effort)  -      Comment, (Toileting) min A to pull up pants in standing  -               User Key  (r) = Recorded By, (t) = Taken By, (c) = Cosigned By      Initials Name Provider Type    Diana Ibarra OT Occupational Therapist                   Obj/Interventions       Row Name 05/09/24 1027          Sensory Assessment (Somatosensory)    Sensory Assessment Block still intact on LUE  -       Row Name 05/09/24 1027          Range of Motion Comprehensive    Comment, General Range of Motion Block still intact on RUE- did not perform ther ex or ROM  -       Row Name 05/09/24 1027          Strength Comprehensive (MMT)    Comment, General Manual Muscle Testing (MMT) Assessment NT on LUE, RUE WFL  -       Row Name 05/09/24 1027          Motor Skills    Therapeutic Exercise --  Educated pt on shoulder protocal exercises. DId not perform on LUE due to block still being intacted. Pt demo understanding on nonaffected UE and provided pt with handout. Spouse also present and understood  -       Row Name 05/09/24 1027          Balance    Balance Assessment sitting static balance;sitting dynamic balance;standing static balance;standing dynamic balance  -KA     Static Sitting Balance modified independence  -KA     Dynamic Sitting Balance modified independence  -     Position, Sitting Balance sitting edge of bed;unsupported  -     Static Standing Balance supervision  -     Dynamic Standing Balance supervision  -KA     Balance Interventions sitting;standing  -               User Key  (r) = Recorded By, (t) = Taken By, (c) = Cosigned By      Initials Name Provider Type    Diana Ibarra OT Occupational Therapist                   Goals/Plan       Row Name 05/09/24 1033          Transfer Goal 1 (OT)    Activity/Assistive Device (Transfer Goal 1, OT) transfers, all  -     [place] Level/Cues Needed (Transfer Goal 1, OT) supervision required  -     Progress/Outcome (Transfer Goal 1, OT) goal met  -       Row Name  05/09/24 1033          Dressing Goal 1 (OT)    Strategies/Barriers (Dressing Goal 1, OT) Pt to demo understanding of UB/LB one handed dressing techniques while following shoulder precautions.  -     Progress/Outcome (Dressing Goal 1, OT) goal met  -CRISS       Row Name 05/09/24 1033          ROM Goal 1 (OT)    ROM Goal 1 (OT) Pt to demo understanding of shoulder HEP to promote joint mobility.  -     Progress/Outcome (ROM Goal 1, OT) goal met  -               User Key  (r) = Recorded By, (t) = Taken By, (c) = Cosigned By      Initials Name Provider Type    Diana Ibarra, TIA Occupational Therapist                   Clinical Impression       Row Name 05/09/24 1031          Pain Assessment    Pretreatment Pain Rating 0/10 - no pain  -KA     Posttreatment Pain Rating 0/10 - no pain  -KA     Pre/Posttreatment Pain Comment no pain block still intact  -CRISS       Row Name 05/09/24 1031          Plan of Care Review    Plan of Care Reviewed With patient;family  -     Outcome Evaluation Pt seen for OT eval this AM. S/p L reverse total shoulder arthroplasty and R shoulder intra-articular cortisone injection. Pt demo understanding of sling management, UB and LB dressing one handed technique and shoulder precautions. She lives with her  who is able to assist her as needed. Pt donned shirt that had snaps easily allowing her to don shirt with affected extremity and  assisted with snaps. She ambulated to the bathroom with supervision and voided and was able to perform nilsa care with RUE. Required min A to pull up pants on L side. Pt plans to dc home with  assist as needed and outpatient therapy.  -CRISS       Row Name 05/09/24 1031          Therapy Assessment/Plan (OT)    Therapy Frequency (OT) evaluation only  dc home today  -       Row Name 05/09/24 1031          Therapy Plan Review/Discharge Plan (OT)    Anticipated Discharge Disposition (OT) home;home with assist;home with outpatient therapy services   -       Row Name 05/09/24 1031          Vital Signs    Pre Patient Position Supine  -KA     Intra Patient Position Standing  -KA     Post Patient Position Supine  -KA       Row Name 05/09/24 1031          Positioning and Restraints    Pre-Treatment Position in bed  -KA     Post Treatment Position bed  -KA     In Bed notified nsg;supine;call light within reach;encouraged to call for assist;with family/caregiver  -CRISS               User Key  (r) = Recorded By, (t) = Taken By, (c) = Cosigned By      Initials Name Provider Type    Diana Ibarra OT Occupational Therapist                   Outcome Measures       Row Name 05/09/24 1035          How much help from another is currently needed...    Putting on and taking off regular lower body clothing? 3  -KA     Bathing (including washing, rinsing, and drying) 3  -KA     Toileting (which includes using toilet bed pan or urinal) 3  -KA     Putting on and taking off regular upper body clothing 3  -KA     Taking care of personal grooming (such as brushing teeth) 3  -KA     Eating meals 3  -KA     AM-PAC 6 Clicks Score (OT) 18  -KA       Row Name 05/09/24 1035          Functional Assessment    Outcome Measure Options AM-PAC 6 Clicks Daily Activity (OT)  -               User Key  (r) = Recorded By, (t) = Taken By, (c) = Cosigned By      Initials Name Provider Type    Diana Ibarra OT Occupational Therapist                    Occupational Therapy Education       Title: PT OT SLP Therapies (Done)       Topic: Occupational Therapy (Done)       Point: ADL training (Done)       Description:   Instruct learner(s) on proper safety adaptation and remediation techniques during self care or transfers.   Instruct in proper use of assistive devices.                  Learning Progress Summary             Patient Acceptance, E,H,D, VU by CRISS at 5/9/2024 1035    Comment: Educated on shoulder precautions, one handed dressing techniques, safety with LB and sling management for a  safe dc home                         Point: Home exercise program (Done)       Description:   Instruct learner(s) on appropriate technique for monitoring, assisting and/or progressing therapeutic exercises/activities.                  Learning Progress Summary             Patient Acceptance, E,H,D, VU by CRISS at 5/9/2024 1035    Comment: Educated on shoulder precautions, one handed dressing techniques, safety with LB and sling management for a safe dc home                         Point: Precautions (Done)       Description:   Instruct learner(s) on prescribed precautions during self-care and functional transfers.                  Learning Progress Summary             Patient Acceptance, E,H,D, VU by CRISS at 5/9/2024 1035    Comment: Educated on shoulder precautions, one handed dressing techniques, safety with LB and sling management for a safe dc home                                         User Key       Initials Effective Dates Name Provider Type Discipline     09/22/22 -  Diana Moore OT Occupational Therapist OT                  OT Recommendation and Plan  Therapy Frequency (OT): evaluation only (dc home today)  Plan of Care Review  Plan of Care Reviewed With: patient, family  Outcome Evaluation: Pt seen for OT andreal this AM. S/p L reverse total shoulder arthroplasty and R shoulder intra-articular cortisone injection. Pt demo understanding of sling management, UB and LB dressing one handed technique and shoulder precautions. She lives with her  who is able to assist her as needed. Pt donned shirt that had snaps easily allowing her to don shirt with affected extremity and  assisted with snaps. She ambulated to the bathroom with supervision and voided and was able to perform nilsa care with RUE. Required min A to pull up pants on L side. Pt plans to dc home with  assist as needed and outpatient therapy.     Time Calculation:         Time Calculation- OT       Row Name 05/09/24 1036              Time Calculation- OT    OT Start Time 0950  -KA      OT Stop Time 1014  -KA      OT Time Calculation (min) 24 min  -KA      OT Received On 05/09/24  -KA         Timed Charges    17082 - OT Self Care/Mgmt Minutes 14  -KA         Untimed Charges    OT Eval/Re-eval Minutes 10  -KA         Total Minutes    Timed Charges Total Minutes 14  -KA      Untimed Charges Total Minutes 10  -KA       Total Minutes 24  -KA                User Key  (r) = Recorded By, (t) = Taken By, (c) = Cosigned By      Initials Name Provider Type    Diana Ibarra OT Occupational Therapist                  Therapy Charges for Today       Code Description Service Date Service Provider Modifiers Qty    73182865536 HC OT SELF CARE/MGMT/TRAIN EA 15 MIN 5/9/2024 Diana Moore OT GO 1    49488886702 HC OT EVAL MOD COMPLEXITY 2 5/9/2024 Diana Moore OT GO 1                 Diana Moore OT  5/9/2024

## 2024-05-09 NOTE — OP NOTE
Orthopaedic Operative Note    Facility: [facility]    Patient: Sagrario Varma    Medical Record Number: [id number]    YOB: 1945    Dictating Surgeon: Juan Galeas M.D.*    Primary Care Physician: Sumeet Delgado MD    Date of Operation: 5/9/2024    Pre-Operative Diagnosis:    1.  Left shoulder end-stage osteoarthritis  2.  Right shoulder osteoarthritis    Post-Operative Diagnosis:    Left shoulder end-stage osteoarthritis  Right shoulder osteoarthritis    Procedure Performed:    1.  Left reverse total shoulder arthroplasty    2.  Right shoulder intra-articular cortisone injection     Surgeon: Juan Galeas MD     Assistant: Rose Javier whose assistance was critical for help with patient positioning, suctioning and irrigation, retraction, manipulation of the extremity for insertion of the implants, wound closure and application of the bandages.  Her assistance was critical to the success of this case.      Anesthesia: Regional followed by Gen.    Complications: None.     Estimated Blood Loss: Less than 50 mL.     Implants: 1.  Biomet size 8 mini comprehensive stem  2.  Medium augmented mini glenoid baseplate with one 6.5 mm central compression screw and 4 peripheral 4.75 mm locking screws  3.  Size 36 standard glenosphere  4.  +3 offset, standard thickness humeral tray with standard thickness E1 humeral bearing    Specimens: * No orders in the log *    Brief Operative Indication:  Ms. Varma had a history of worsening left shoulder pain and dysfunction which had been nonresponsive to conservative treatment.  We had a thorough discussion regarding the risks, benefits and alternatives to an arthroplasty and nonsurgical management versus surgery.  I explained that surgical risks include infection, hematoma, hardware related complications including failure of fixation, loosening, fracture, persistent pain and/or loss of motion, iatrogenic nerve and/or blood vessel  injury resulting in permanent weakness, numbness or dysfunction, DVT, PE, positioning related neuropraxia, and anesthesia related complications resulting in death.  We discussed the indication for a reverse as opposed to a standard total shoulder and the risks inherent to the reverse including notching, glenoid loosening, instability, and traction related neuropraxia, any of which could result in persistent pain or problems requiring further surgery.  Last, we discussed the rehab and all that will be expected of the patient postoperatively to ensure an optimal outcome.  She voiced understanding of the risks, benefits, and alternative forms of treatment that were discussed and consented to proceed.  She also wanted to get her right shoulder injected during the anesthetic.  The risks of this procedure were discussed as well and again she consented.    Description of the procedure in detail:  The patient and operative site were identified in the preoperative holding area.  The surgical site was marked.  Adequate regional anesthesia was administered. She was then taken to the operating room.  The patient was placed on the operating table where adequate general anesthesia was administered. She was then repositioned into the modified beachchair position with the head and neck in neutral alignment.  All bony prominences were carefully padded and protected.    The left upper extremity was then prepped and draped in the standard, sterile fashion.  The extremity was cleaned with an alcohol solution.  A Hibiclens scrub was performed and then the extremity was prepped with 2 ChloraPrep preps.  I allowed this to dry for 3 minutes before the draping procedure was carried out.    A timeout was taken and preoperative antibiotics administered.  Tranexamic acid was administered at this time as well.  Following this, I began by fashioning an approximately 6 cm incision over the anterior aspect of the shoulder.  This was carried down  through the skin and subcutaneous tissues.  Full-thickness medial and lateral skin flaps were developed.  The interval between the deltoid and pectoralis was carefully identified and developed.  The underlying cephalic vein was dissected out and retracted laterally.  This structure was kept protected throughout the case.    The clavipectoral fascia was divided.  The strap muscles were retracted medially.  The biceps groove was identified.  The biceps tendon was carefully dissected out.  The biceps was released from its attachment to the supraglenoid tubercle using curved Weston scissors.  The diseased, intra-articular portion of the biceps was removed.  The remaining intact tendon was tenodesed to the pectoralis tendon using multiple MaxBraid sutures which were tied using 6 throw surgeon's knots.    I then directed my attention to the shoulder.  Her subscapularis was carefully tagged and released.  I left a small cuff of tissue on the lesser tuberosity for a later anatomic repair of this structure.   The humeral head was then completely exposed.  The periarticular osteophytes were carefully removed with a rongeur.    The cutting guide for the head cut was inserted.  This was set to 20° of retroversion which I judged off of the forearm.  With the guide in position, I demarcated the cut and then carried out the cut using an oscillating saw.  The cut portion of the bone was removed and taken to the back table for possible bone grafting later in the case, if needed.    Having completed the humeral sided preparations, I then directed my attention to the glenoid.  The axillary nerve was carefully dissected out and identified.  This structure was protected.  Retractors were carefully positioned along the anterior, posterior and inferior glenoid rim.  I carefully exposed the caudal rim of the glenoid using the electrocautery.  The anterior, inferior and posterior glenoid labrum were then carefully debrided and removed along  with the remaining biceps stump superiorly.  The centering guide for the baseplate was inserted.      She had significant retroversion and I determined that an augment would likely be necessary.  The center pin for the baseplate was drilled in the center of the glenoid vault.  This was offset to allow for corrective reaming of her deformity.  I then carefully reamed and prepared the glenoid in typical fashion.  I preferentially reamed anteriorly but there was still some much posterior and superior bone loss that an augment was necessary.  I trialed with multiple augments.  The medium augment fit best.    Once I had completed the reaming process and made sure that the reaming was adequate, the augmented baseplate was impacted into position.  This was secured with a single screw central compression screw which got great purchase.  The 4 peripheral locking screws were then placed without complication.  All 4 screws were confirmed to lock into the baseplate.  With the baseplate secured, I examined the remaining glenoid.  I did determine that a 36 glenosphere would fit best in this case.  The appropriate size implant was selected for use and then impacted.  I took care to make sure that the Lucas taper was fully engaged and that the implant was well-seated.  I used a right angle clamp to pass this beneath the implant and pull up.  When doing so, the entire scapula moved.  Once I was satisfied that this was well seated, I then directed my attention back to the humerus.    The humeral sided preparations were carried out in the typical fashion.  I reamed and broached the humerus up to a 8 mini broach which seemed to fit well.  The appropriate size implant was impacted into position, taking care to maintain appropriate retroversion as judged off of the forearm.  The implants seated well.  I then trialed off of the stem.  The +3 offset, standard thickness trial tray with a 36 mm standard thickness trial bearing seemed to fit  best.  This allowed for excellent motion and stability.  The trial component was removed and then the final component impacted.  Again, I took care to make sure that the Lucas taper was fully engaged before reducing it and carrying the shoulder through range of motion.    Again, the shoulder demonstrated excellent motion and stability.  I could fully elevate, abduct and externally rotate the shoulder without any impingement or limitation.  The shoulder demonstrated excellent motion and absolutely no instability.  There was good tension in the periarticular soft tissue structures.  At this point, I directed my attention to the subscapularis repair.  The arm was placed in approximately 30 degrees of external rotation.  The subscapularis was then anatomically repaired using multiple #2 MaxBraid sutures.  I then irrigated the wound with 500 mL of a Betadine-containing saline solution.  I then irrigated with 3 L of sterile saline via pulsatile lavage.  I made sure that we had good hemostasis.  The wound was sequentially closed in a layered fashion.  Vicryl was used to repair the subcutaneous tissues.  A running subcuticular Monocryl stitch was used to close the skin followed by Steri-strips.  Sterile dressings were applied.  The drapes were withdrawn. Her arm was placed in a sling.     I then documented to the right shoulder.  The anterior aspect the shoulder was prepped using a ChloraPrep.  Under sterile conditions and without complication I injected the right glenohumeral joint with 80 cc Depo-Medrol and 3 cc of half percent bupivacaine with epinephrine.  A sterile bandage was applied.  She was awakened and taken to the recovery room in good condition.    Juan Galeas MD  05/09/24

## 2024-05-13 ENCOUNTER — TELEPHONE (OUTPATIENT)
Dept: ORTHOPEDIC SURGERY | Facility: HOSPITAL | Age: 79
End: 2024-05-13
Payer: MEDICARE

## 2024-05-13 NOTE — TELEPHONE ENCOUNTER
Post op day 4  Discharge Instructions:  Ask patient about his or her discharge instructions  ?  Patient confirmed understanding   []  Further instruction needed   What, if any, recommendations, teaching, or interventions did you provide? Click or tap here to enter text.  Health status:  Pain controlled Yes   She said she hasn't needed the pain medication.   Recommended interventions:  Yes  incision/dressing status   ?  Clean without redness, drainage, odor  []  Redness    []  Drainage - color Click or tap here to enter text.  []  Odor  SAHIL - Green light blinking Choose an item.  Difficulties urination No  Last BM 5/13/2024 (if no BM by day 3-recommend OTC suppository or fleets enema)  No issues.   Medications:  ?Medications reviewed with patient/family/caregiver  Patient taking medications as prescribed?   Yes  If not taking medications as prescribed, note specific medicine(s) and reason for each:  Click or tap here to enter text.  Hospital Follow Up Plan:  Follow up Appointment with Orthopedic surgeon:  ?Has f/u appointment                []Scheduled f/u appointment  Home Care ordered at discharge?    No        Home Care started, or contact made?    No   If no, action taken: Total Shoulder   DME obtained/used in home?         Yes   Using IS  Choose an item.   Other information: Ms. Varma said she is doing ok. The block has worn off and she doesn't really have any pain. She has taken 2 Tylenol since the surgery. She hasn't required any pain medication. She remains in the sling. She has been able to take a shower. Dressing looks good. She is doing the exercises and icing. Sleeping ok for the most part. She starts with OP PT tomorrow. She said she has had a couple episodes where she feels really hot and flushed but no fever. The Tylenol helped. Ms. Varma doesn't have any questions for me at this time She has my contact information should she need anything.

## 2024-05-24 ENCOUNTER — OFFICE VISIT (OUTPATIENT)
Dept: ORTHOPEDIC SURGERY | Facility: CLINIC | Age: 79
End: 2024-05-24
Payer: MEDICARE

## 2024-05-24 VITALS — TEMPERATURE: 97.7 F | BODY MASS INDEX: 30.42 KG/M2 | WEIGHT: 178.2 LBS | HEIGHT: 64 IN

## 2024-05-24 DIAGNOSIS — R52 PAIN: Primary | ICD-10-CM

## 2024-05-24 DIAGNOSIS — Z09 SURGERY FOLLOW-UP: ICD-10-CM

## 2024-05-24 NOTE — PROGRESS NOTES
"Sagrario Varma : 1945 MRN: [id number] DATE: 2024    DIAGNOSIS:  2 week follow up left shoulder arthroplasty      SUBJECTIVE:  Ms. Varma returns today for 2 week follow up of left shoulder replacement. Patient reports doing well with no unusual complaints.      OBJECTIVE:    Temp 97.7 °F (36.5 °C) (Temporal)   Ht 162.6 cm (64\")   Wt 80.8 kg (178 lb 3.2 oz)   BMI 30.59 kg/m²     Exam:  The incision is well approximated.  No erythema or drainage. Shoulder moves fluidly with pendulums.  The arm is soft and nontender.  Intact motor and sensory function in the lower arm and hand.  Palpable radial pulse.    DIAGNOSTIC STUDIES    Xrays: AP and scapular Y views of the left shoulder are ordered and reviewed for evaluation of the recent shoulder replacement.  The x-rays demonstrate a well positioned, well aligned replacement without complicating factors noted.  In comparison with previous films, there has been no change.    ASSESSMENT: 2 week follow up left shoulder replacement.    PLAN:   1.  PT per protocol  2.  Continue sling until next visit.  3.  Counseled patient about appropriate activity modifications and restrictions, including no driving at this point.    Juan Galeas MD   "

## 2024-06-06 ENCOUNTER — TELEPHONE (OUTPATIENT)
Dept: ORTHOPEDIC SURGERY | Facility: HOSPITAL | Age: 79
End: 2024-06-06
Payer: MEDICARE

## 2024-06-06 NOTE — TELEPHONE ENCOUNTER
Attempted to reach Ms. Varma to see how she has been doing sine her LTSRA 5/9. Message left at this time.

## 2024-06-21 ENCOUNTER — OFFICE VISIT (OUTPATIENT)
Dept: ORTHOPEDIC SURGERY | Facility: CLINIC | Age: 79
End: 2024-06-21
Payer: MEDICARE

## 2024-06-21 VITALS — WEIGHT: 176.4 LBS | TEMPERATURE: 97.8 F | BODY MASS INDEX: 30.11 KG/M2 | HEIGHT: 64 IN

## 2024-06-21 DIAGNOSIS — Z09 SURGERY FOLLOW-UP: Primary | ICD-10-CM

## 2024-06-21 NOTE — PROGRESS NOTES
"Sagrario Varma : 1945 MRN: [id number] DATE: 2024    DIAGNOSIS: 6 week follow up left shoulder arthroplasty      SUBJECTIVE:  Patient returns today for 6 week follow up of left shoulder replacement. Patient reports doing well with no unusual complaints.     OBJECTIVE:    Temp 97.8 °F (36.6 °C) (Temporal)   Ht 162.6 cm (64\")   Wt 80 kg (176 lb 6.4 oz)   BMI 30.28 kg/m²     Exam: The incision is well healed. No erythema or drainage. Shoulder moves fluidly with pendulums.  Motion is on track per protocol.  The arm is soft and nontender.  Good motor and sensory function.  Palpable distal pulses.     DIAGNOSTIC STUDIES    Xrays: AP and scapular Y views of the left shoulder are ordered and reviewed for evaluation of shoulder replacement.  They demonstrate a well positioned, well aligned replacement without complicating factors noted.  In comparison with previous films there has been no change.    ASSESSMENT: 6 week follow up left shoulder replacement    PLAN:   1.  Continue PT per protocol.  2.  Discontinue sling and begin working on progressing ROM as tolerated.  3.  Counseled patient about appropriate activity modifications and restrictions.  Released to drive at this point.  4.  We discussed the need for prophylactic antibiotics prior to dental appointments for 2 years following replacement surgery.  5.  Follow up in 6 weeks with Dr. Galeas.    Rylee Reyes, MEGHAN    2024     Answers submitted by the patient for this visit:  Other (Submitted on 2024)  Please describe your symptoms.: Surgery follow up.  Have you had these symptoms before?: No  How long have you been having these symptoms?: 1-4 days  Please list any medications you are currently taking for this condition.: None  Please describe any probable cause for these symptoms. : Surgery  Primary Reason for Visit (Submitted on 2024)  What is the primary reason for your visit?: Other    "

## 2024-08-09 ENCOUNTER — OFFICE VISIT (OUTPATIENT)
Dept: ORTHOPEDIC SURGERY | Facility: CLINIC | Age: 79
End: 2024-08-09
Payer: MEDICARE

## 2024-08-09 VITALS — WEIGHT: 178.4 LBS | TEMPERATURE: 98.8 F | BODY MASS INDEX: 30.46 KG/M2 | HEIGHT: 64 IN

## 2024-08-09 DIAGNOSIS — R52 PAIN: Primary | ICD-10-CM

## 2024-08-09 NOTE — PROGRESS NOTES
"Sagrario Varma : 1945 MRN: [id number] DATE: 2024    DIAGNOSIS: 3 month follow up left shoulder arthroplasty      SUBJECTIVE:  Ms. Varma returns today for 3 month follow up of left shoulder replacement.  Patient reports doing well with no unusual complaints. Still in PT and reports making good progress.    OBJECTIVE:    Temp 98.8 °F (37.1 °C)   Ht 163.2 cm (64.25\")   Wt 80.9 kg (178 lb 6.4 oz)   BMI 30.38 kg/m²     Review of Systems negative except as above    Exam:  The incision is well healed. No effusion.  Range of motion:  .  ER 45.  IR to her back pocket. The arm is soft and nontender.  5-/5 strength with elevation and abduction.  Sensation intact distally.  Brisk cap refill.    DIAGNOSTIC STUDIES    Xrays: AP, scapular Y and axillary views of the left shoulder are ordered and reviewed for evaluation of shoulder replacement. They demonstrate a well positioned, well aligned replacement without complicating factors noted.  In comparison with previous films there has been no change.    ASSESSMENT:  3 month follow up left shoulder replacement    PLAN:   1.  Continue appropriate activity modifications and restrictions as discussed  2.  Continue PT per protocol.  3.  I explained that one can expect continued improvement in motion, function, and any residual discomfort for up to 1 year after surgery.  4.  We discussed the need for prophylactic antibiotics prior to dental appointments for 2 years following replacement surgery.  5.  Follow up at 1 year unless experiencing any new or concerning symptoms.      Juan Galeas MD    2024    Answers submitted by the patient for this visit:  Other (Submitted on 2024)  Please describe your symptoms.: You are again asking a non-relevant questions. This is a scheduled post-surgery appointment. Ignore answers below.  Have you had these symptoms before?: Yes  How long have you been having these symptoms?: Greater than 2 weeks  Please list any " medications you are currently taking for this condition.: Not applicable.  Please describe any probable cause for these symptoms. : Not applicable.  Primary Reason for Visit (Submitted on 8/4/2024)  What is the primary reason for your visit?: Other

## 2024-08-19 DIAGNOSIS — E89.0 POSTOPERATIVE HYPOTHYROIDISM: ICD-10-CM

## 2024-08-19 DIAGNOSIS — I10 ESSENTIAL HYPERTENSION: ICD-10-CM

## 2024-08-19 DIAGNOSIS — E78.2 MIXED HYPERLIPIDEMIA: ICD-10-CM

## 2024-08-19 DIAGNOSIS — E04.2 NON-TOXIC MULTINODULAR GOITER: ICD-10-CM

## 2024-08-19 RX ORDER — BENAZEPRIL HYDROCHLORIDE AND HYDROCHLOROTHIAZIDE 20; 12.5 MG/1; MG/1
TABLET ORAL
Qty: 90 TABLET | Refills: 3 | Status: SHIPPED | OUTPATIENT
Start: 2024-08-19

## 2024-08-19 RX ORDER — LEVOTHYROXINE SODIUM 50 MCG
TABLET ORAL
Qty: 90 TABLET | Refills: 3 | Status: SHIPPED | OUTPATIENT
Start: 2024-08-19

## 2024-08-19 RX ORDER — ATORVASTATIN CALCIUM 10 MG/1
TABLET, FILM COATED ORAL
Qty: 90 TABLET | Refills: 3 | Status: SHIPPED | OUTPATIENT
Start: 2024-08-19

## 2024-09-30 ENCOUNTER — OFFICE VISIT (OUTPATIENT)
Dept: FAMILY MEDICINE CLINIC | Facility: CLINIC | Age: 79
End: 2024-09-30
Payer: MEDICARE

## 2024-09-30 VITALS
WEIGHT: 179.6 LBS | OXYGEN SATURATION: 100 % | SYSTOLIC BLOOD PRESSURE: 142 MMHG | HEIGHT: 64 IN | RESPIRATION RATE: 16 BRPM | HEART RATE: 81 BPM | BODY MASS INDEX: 30.66 KG/M2 | DIASTOLIC BLOOD PRESSURE: 82 MMHG

## 2024-09-30 DIAGNOSIS — I10 ESSENTIAL HYPERTENSION: ICD-10-CM

## 2024-09-30 DIAGNOSIS — R73.02 IMPAIRED GLUCOSE TOLERANCE: ICD-10-CM

## 2024-09-30 DIAGNOSIS — Z00.00 ROUTINE HEALTH MAINTENANCE: Primary | ICD-10-CM

## 2024-09-30 DIAGNOSIS — E78.2 MIXED HYPERLIPIDEMIA: ICD-10-CM

## 2024-09-30 PROCEDURE — G0439 PPPS, SUBSEQ VISIT: HCPCS | Performed by: FAMILY MEDICINE

## 2024-09-30 PROCEDURE — 3077F SYST BP >= 140 MM HG: CPT | Performed by: FAMILY MEDICINE

## 2024-09-30 PROCEDURE — 1160F RVW MEDS BY RX/DR IN RCRD: CPT | Performed by: FAMILY MEDICINE

## 2024-09-30 PROCEDURE — 1159F MED LIST DOCD IN RCRD: CPT | Performed by: FAMILY MEDICINE

## 2024-09-30 PROCEDURE — 1126F AMNT PAIN NOTED NONE PRSNT: CPT | Performed by: FAMILY MEDICINE

## 2024-09-30 PROCEDURE — 3079F DIAST BP 80-89 MM HG: CPT | Performed by: FAMILY MEDICINE

## 2024-09-30 NOTE — PATIENT INSTRUCTIONS
Mindfulness apps: Headspace, Smiling Mind, [place]a!    Mindfulness-Based Stress Reduction  Mindfulness-based stress reduction (MBSR) is a program that helps people learn to practice mindfulness. Mindfulness is the practice of intentionally paying attention to the present moment. It can be learned and practiced through techniques such as education, breathing exercises, meditation, and yoga. MBSR includes several mindfulness techniques in one program.  MBSR works best when you understand the treatment, are willing to try new things, and can commit to spending time practicing what you learn. MBSR training may include learning about:  How your emotions, thoughts, and reactions affect your body.  New ways to respond to things that cause negative thoughts to start (triggers).  How to notice your thoughts and let go of them.  Practicing awareness of everyday things that you normally do without thinking.  The techniques and goals of different types of meditation.  What are the benefits of MBSR?  MBSR can have many benefits, which include helping you to:  Develop self-awareness. This refers to knowing and understanding yourself.  Learn skills and attitudes that help you to participate in your own health care.  Learn new ways to care for yourself.  Be more accepting about how things are, and let things go.  Be less judgmental and approach things with an open mind.  Be patient with yourself and trust yourself more.  MBSR has also been shown to:  Reduce negative emotions, such as depression and anxiety.  Improve memory and focus.  Change how you sense and approach pain.  Boost your body's ability to fight infections.  Help you connect better with other people.  Improve your sense of well-being.  Follow these instructions at home:    Find a local in-person or online MBSR program.  Set aside some time regularly for mindfulness practice.  Find a mindfulness practice that works best for you. This may include one or more of the  following:  Meditation. Meditation involves focusing your mind on a certain thought or activity.  Breathing awareness exercises. These help you to stay present by focusing on your breath.  Body scan. For this practice, you lie down and pay attention to each part of your body from head to toe. You can identify tension and soreness and intentionally relax parts of your body.  Yoga. Yoga involves stretching and breathing, and it can improve your ability to move and be flexible. It can also provide an experience of testing your body's limits, which can help you release stress.  Mindful eating. This way of eating involves focusing on the taste, texture, color, and smell of each bite of food. Because this slows down eating and helps you feel full sooner, it can be an important part of a weight-loss plan.  Find a podcast or recording that provides guidance for breathing awareness, body scan, or meditation exercises. You can listen to these any time when you have a free moment to rest without distractions.  Follow your treatment plan as told by your health care provider. This may include taking regular medicines and making changes to your diet or lifestyle as recommended.  How to practice mindfulness  To do a basic awareness exercise:  Find a comfortable place to sit.  Pay attention to the present moment. Observe your thoughts, feelings, and surroundings just as they are.  Avoid placing judgment on yourself, your feelings, or your surroundings. Make note of any judgment that comes up, and let it go.  Your mind may wander, and that is okay. Make note of when your thoughts drift, and return your attention to the present moment.  To do basic mindfulness meditation:  Find a comfortable place to sit. This may include a stable chair or a firm floor cushion.  Sit upright with your back straight. Let your arms fall next to your side with your hands resting on your legs.  If sitting in a chair, rest your feet flat on the floor.  If  sitting on a cushion, cross your legs in front of you.  Keep your head in a neutral position with your chin dropped slightly. Relax your jaw and rest the tip of your tongue on the roof of your mouth. Drop your gaze to the floor. You can close your eyes if you like.  Breathe normally and pay attention to your breath. Feel the air moving in and out of your nose. Feel your belly expanding and relaxing with each breath.  Your mind may wander, and that is okay. Make note of when your thoughts drift, and return your attention to your breath.  Avoid placing judgment on yourself, your feelings, or your surroundings. Make note of any judgment or feelings that come up, let them go, and bring your attention back to your breath.  When you are ready, lift your gaze or open your eyes. Pay attention to how your body feels after the meditation.  Where to find more information  You can find more information about MBSR from:  Your health care provider.  Community-based meditation centers or programs.  Programs offered near you.  Summary  Mindfulness-based stress reduction (MBSR) is a program that teaches you how to intentionally pay attention to the present moment. It is used with other treatments to help you cope better with daily stress, emotions, and pain.  MBSR focuses on developing self-awareness, which allows you to respond to life stress without judgment or negative emotions.  MBSR programs may involve learning different mindfulness practices, such as breathing exercises, meditation, yoga, body scan, or mindful eating. Find a mindfulness practice that works best for you, and set aside time for it on a regular basis.  This information is not intended to replace advice given to you by your health care provider. Make sure you discuss any questions you have with your health care provider.  Document Released: 04/26/2018 Document Revised: 11/30/2018 Document Reviewed: 04/26/2018  Elsevier Patient Education © 2020 [employer]

## 2024-09-30 NOTE — PROGRESS NOTES
Subjective   The ABCs of the Annual Wellness Visit  Medicare Wellness Visit      Sagrario Varma is a 78 y.o. patient who presents for a Medicare Wellness Visit.    Here as above. Doing well overall, no real concerns at this time. Due for labs, otherwise caught up on preventive health recommendations. Has plans to get flu and covid vaccines on the 10th of next month.     The following portions of the patient's history were reviewed and   updated as appropriate: allergies, current medications, past family history, past medical history, past social history, past surgical history, and problem list.    Compared to one year ago, the patient's physical   health is the same.  Compared to one year ago, the patient's mental   health is the same.    Recent Hospitalizations:  She was not admitted to the hospital during the last year.     Current Medical Providers:  Patient Care Team:  Sumeet Delgado MD as PCP - General (Family Medicine)  Ines Zavala MD as Consulting Physician (Hematology and Oncology)  Simba Reid MD as Referring Physician (General Surgery)    Outpatient Medications Prior to Visit   Medication Sig Dispense Refill    acetaminophen (TYLENOL) 325 MG tablet Take 2 tablets by mouth 2 (Two) Times a Day As Needed for Mild Pain. 60 tablet 0    Aflibercept (Eylea HD) 8 MG/0.07ML solution EVERY 8-12- WEEKS      Ascorbic Acid (VITAMIN C) 500 MG capsule Take 1 capsule by mouth Daily. HOLDING FOR DOS      aspirin (SB Low Dose ASA EC) 81 MG EC tablet Take 1 tablet by mouth Daily. HOLDING FOR DOS      atorvastatin (LIPITOR) 10 MG tablet TAKE 1 TABLET DAILY 90 tablet 3    benazepril-hydrochlorthiazide (LOTENSIN HCT) 20-12.5 MG per tablet TAKE 1 TABLET DAILY 90 tablet 3    Calcium Carb-Cholecalciferol (CALCIUM 500 + D PO) Take 1 tablet by mouth Daily.      cetirizine (zyrTEC) 10 MG tablet Take 1 tablet by mouth Daily.      Cholecalciferol (VITAMIN D) 1000 UNITS tablet Take 1 tablet by mouth Daily. HOLDING  FOR DOS      Denosumab (PROLIA SC) Inject  under the skin Every 6 (Six) Months.      doxycycline (VIBRAMYCIN) 100 MG capsule Take 1 capsule by mouth Daily.      metroNIDAZOLE (METROCREAM) 0.75 % cream Apply 1 dose topically to the appropriate area as directed 2 (Two) Times a Day.      Multiple Vitamins-Minerals (MULTIVITAMIN ADULT PO) Take 1 tablet by mouth Daily. HOLDING FOR DOS      multivitamins-minerals (PRESERVISION AREDS 2) capsule capsule Take 1 capsule by mouth 2 (Two) Times a Day. HOLDING FOR DOS      mupirocin (BACTROBAN) 2 % ointment Apply 1 Application topically to the appropriate area as directed Every 12 (Twelve) Hours As Needed.      sulfacetamide sodium-sulfur (AVAR,PLEXION) 10-5 % topical emulsion Apply  topically to the appropriate area as directed 2 (Two) Times a Day.      Synthroid 50 MCG tablet TAKE 1 TABLET DAILY 90 tablet 3    docusate sodium (COLACE) 100 MG capsule Take 1 capsule by mouth 2 (Two) Times a Day. 60 capsule 0    HYDROcodone-acetaminophen (NORCO) 7.5-325 MG per tablet Take 1 tablet by mouth Every 4 (Four) Hours As Needed. 30 tablet 0    ondansetron (Zofran) 4 MG tablet Take 1 tablet by mouth Every 8 (Eight) Hours As Needed for Nausea or Vomiting. 12 tablet 0     No facility-administered medications prior to visit.     No opioid medication identified on active medication list. I have reviewed chart for other potential  high risk medication/s and harmful drug interactions in the elderly.      Aspirin is on active medication list. Aspirin use is indicated based on review of current medical condition/s. Pros and cons of this therapy have been discussed today. Benefits of this medication outweigh potential harm.  Patient has been encouraged to continue taking this medication.  .      Patient Active Problem List   Diagnosis    Generalized osteoarthritis    Mixed hyperlipidemia    Essential hypertension    Non-toxic multinodular goiter    Impaired glucose tolerance    Vitamin D  "deficiency    Ductal carcinoma in situ (DCIS) of breast    Family history of non-Hodgkin's lymphoma    Family history of Hodgkin's lymphoma    Family history of breast cancer    Adhesive capsulitis of left shoulder    Macular degeneration of both eyes    Postoperative hypothyroidism    Age related osteoporosis    Screening mammogram, encounter for     Mass of axillary tail of left breast     Class 1 obesity due to excess calories without serious comorbidity with body mass index (BMI) of 32.0 to 32.9 in adult    Shoulder arthritis    Arthritis of shoulder     Advance Care Planning Advance Directive is not on file.  ACP discussion was held with the patient during this visit. Patient does not have an advance directive, information provided.      Objective   Vitals:    24 0756   BP: 142/82   Pulse: 81   Resp: 16   SpO2: 100%   Weight: 81.5 kg (179 lb 9.6 oz)   Height: 163.2 cm (64.25\")   PainSc: 0-No pain     Estimated body mass index is 30.59 kg/m² as calculated from the following:    Height as of this encounter: 163.2 cm (64.25\").    Weight as of this encounter: 81.5 kg (179 lb 9.6 oz).       Does the patient have evidence of cognitive impairment? No                                                                             Health  Risk Assessment    Smoking Status:  Social History     Tobacco Use   Smoking Status Never    Passive exposure: Never   Smokeless Tobacco Never     Alcohol Consumption:  Social History     Substance and Sexual Activity   Alcohol Use Yes    Comment: SELDOM       Fall Risk Screen  STEADI Fall Risk Assessment was completed, and patient is at LOW risk for falls.Assessment completed on:2024    Depression Screenin/30/2024     8:00 AM   PHQ-2/PHQ-9 Depression Screening   Little Interest or Pleasure in Doing Things 0-->not at all   Feeling Down, Depressed or Hopeless 0-->not at all   PHQ-9: Brief Depression Severity Measure Score 0     Health Habits and Functional and Cognitive " Screenin/26/2024    11:44 AM   Functional & Cognitive Status   Do you have difficulty preparing food and eating? No   Do you have difficulty bathing yourself, getting dressed or grooming yourself? No   Do you have difficulty using the toilet? No   Do you have difficulty moving around from place to place? No   Do you have trouble with steps or getting out of a bed or a chair? No   Current Diet Well Balanced Diet   Dental Exam Up to date   Eye Exam Up to date   Exercise (times per week) 10 times per week   Current Exercises Include House Cleaning;Other   Do you need help using the phone?  No   Are you deaf or do you have serious difficulty hearing?  No   Do you need help to go to places out of walking distance? No   Do you need help shopping? No   Do you need help preparing meals?  No   Do you need help with housework?  No   Do you need help with laundry? No   Do you need help taking your medications? No   Do you need help managing money? No   Do you ever drive or ride in a car without wearing a seat belt? No   Have you felt unusual stress, anger or loneliness in the last month? No   Who do you live with? Spouse   If you need help, do you have trouble finding someone available to you? No   Have you been bothered in the last four weeks by sexual problems? No   Do you have difficulty concentrating, remembering or making decisions? No           Age-appropriate Screening Schedule:  Refer to the list below for future screening recommendations based on patient's age, sex and/or medical conditions. Orders for these recommended tests are listed in the plan section. The patient has been provided with a written plan.    Health Maintenance List  Health Maintenance   Topic Date Due    DXA SCAN  2024    COVID-19 Vaccine ( season) 2024    LIPID PANEL  2024    INFLUENZA VACCINE  2024    MAMMOGRAM  2025    BMI FOLLOWUP  2025    ANNUAL WELLNESS VISIT  2025    TDAP/TD  VACCINES (3 - Td or Tdap) 01/04/2028    COLORECTAL CANCER SCREENING  02/01/2032    RSV Vaccine - Adults  Completed    Pneumococcal Vaccine 65+  Completed    ZOSTER VACCINE  Completed    HEPATITIS C SCREENING  Addressed                                                                                                                                                CMS Preventative Services Quick Reference  Risk Factors Identified During Encounter  Immunizations Discussed/Encouraged: Influenza and COVID19    The above risks/problems have been discussed with the patient.  Pertinent information has been shared with the patient in the After Visit Summary.  An After Visit Summary and PPPS were made available to the patient.    Follow Up:   Next Medicare Wellness visit to be scheduled in 1 year.     Assessment & Plan  Routine health maintenance    Mixed hyperlipidemia     Essential hypertension    Impaired glucose tolerance      Orders Placed This Encounter   Procedures    Comprehensive Metabolic Panel    Lipid Panel    Hemoglobin A1c     Orders as above. I will contact her with results as available. Vaccines as scheduled.    Encouraged to continue working on healthy lifestyle habits. Recommended follow-up as below. Encouraged communication via [employer] in the meantime.    Follow Up:   Return in about 1 year (around 9/30/2025), or if symptoms worsen or fail to improve, for Medicare Wellness.    Prevention counseling performed as below: Mindfulness for stress management.

## 2024-10-01 LAB
ALBUMIN SERPL-MCNC: 4.2 G/DL (ref 3.5–5.2)
ALBUMIN/GLOB SERPL: 1.9 G/DL
ALP SERPL-CCNC: 116 U/L (ref 39–117)
ALT SERPL-CCNC: 24 U/L (ref 1–33)
AST SERPL-CCNC: 25 U/L (ref 1–32)
BILIRUB SERPL-MCNC: 0.3 MG/DL (ref 0–1.2)
BUN SERPL-MCNC: 9 MG/DL (ref 8–23)
BUN/CREAT SERPL: 13.6 (ref 7–25)
CALCIUM SERPL-MCNC: 9.1 MG/DL (ref 8.6–10.5)
CHLORIDE SERPL-SCNC: 101 MMOL/L (ref 98–107)
CHOLEST SERPL-MCNC: 157 MG/DL (ref 0–200)
CO2 SERPL-SCNC: 28.9 MMOL/L (ref 22–29)
CREAT SERPL-MCNC: 0.66 MG/DL (ref 0.57–1)
EGFRCR SERPLBLD CKD-EPI 2021: 90.5 ML/MIN/1.73
GLOBULIN SER CALC-MCNC: 2.2 GM/DL
GLUCOSE SERPL-MCNC: 85 MG/DL (ref 65–99)
HBA1C MFR BLD: 5.7 % (ref 4.8–5.6)
HDLC SERPL-MCNC: 52 MG/DL (ref 40–60)
LDLC SERPL CALC-MCNC: 85 MG/DL (ref 0–100)
POTASSIUM SERPL-SCNC: 4.3 MMOL/L (ref 3.5–5.2)
PROT SERPL-MCNC: 6.4 G/DL (ref 6–8.5)
SODIUM SERPL-SCNC: 141 MMOL/L (ref 136–145)
TRIGL SERPL-MCNC: 111 MG/DL (ref 0–150)
VLDLC SERPL CALC-MCNC: 20 MG/DL (ref 5–40)

## 2024-10-30 DIAGNOSIS — Z96.612 STATUS POST REVERSE TOTAL REPLACEMENT OF LEFT SHOULDER: Primary | ICD-10-CM

## 2024-10-30 RX ORDER — CLINDAMYCIN HYDROCHLORIDE 300 MG/1
CAPSULE ORAL
Qty: 2 CAPSULE | Refills: 0 | Status: SHIPPED | OUTPATIENT
Start: 2024-10-30

## 2024-10-30 NOTE — TELEPHONE ENCOUNTER
"    Caller: Sagrario Varma \"Nilam\"    Relationship: Self    Best call back number: [phone]    Requested Prescriptions: ANTIBIOTIC FOR DENTAL APPT     Pharmacy where request should be sent:  [phone]    Last office visit with prescribing clinician: 8/9/2024     Next office visit with prescribing clinician: 2/14/2025     "

## 2024-12-04 DIAGNOSIS — I65.23 BILATERAL CAROTID ARTERY OCCLUSION: Primary | ICD-10-CM

## 2024-12-17 DIAGNOSIS — D05.10 DUCTAL CARCINOMA IN SITU (DCIS) OF BREAST, UNSPECIFIED LATERALITY: Primary | ICD-10-CM

## 2025-01-24 ENCOUNTER — LAB (OUTPATIENT)
Dept: LAB | Facility: HOSPITAL | Age: 80
End: 2025-01-24
Payer: MEDICARE

## 2025-01-24 ENCOUNTER — OFFICE VISIT (OUTPATIENT)
Dept: ONCOLOGY | Facility: CLINIC | Age: 80
End: 2025-01-24
Payer: MEDICARE

## 2025-01-24 VITALS
HEART RATE: 80 BPM | SYSTOLIC BLOOD PRESSURE: 132 MMHG | HEIGHT: 64 IN | RESPIRATION RATE: 16 BRPM | WEIGHT: 179.2 LBS | TEMPERATURE: 97.2 F | DIASTOLIC BLOOD PRESSURE: 80 MMHG | BODY MASS INDEX: 30.59 KG/M2 | OXYGEN SATURATION: 96 %

## 2025-01-24 DIAGNOSIS — Z12.31 ENCOUNTER FOR SCREENING MAMMOGRAM FOR MALIGNANT NEOPLASM OF BREAST: ICD-10-CM

## 2025-01-24 DIAGNOSIS — D05.10 DUCTAL CARCINOMA IN SITU (DCIS) OF BREAST, UNSPECIFIED LATERALITY: Primary | ICD-10-CM

## 2025-01-24 DIAGNOSIS — D05.10 DUCTAL CARCINOMA IN SITU (DCIS) OF BREAST, UNSPECIFIED LATERALITY: ICD-10-CM

## 2025-01-24 LAB
ALBUMIN SERPL-MCNC: 3.9 G/DL (ref 3.5–5.2)
ALBUMIN/GLOB SERPL: 1.3 G/DL
ALP SERPL-CCNC: 121 U/L (ref 39–117)
ALT SERPL W P-5'-P-CCNC: 20 U/L (ref 1–33)
ANION GAP SERPL CALCULATED.3IONS-SCNC: 11.1 MMOL/L (ref 5–15)
AST SERPL-CCNC: 24 U/L (ref 1–32)
BASOPHILS # BLD AUTO: 0.05 10*3/MM3 (ref 0–0.2)
BASOPHILS NFR BLD AUTO: 0.6 % (ref 0–1.5)
BILIRUB SERPL-MCNC: 0.2 MG/DL (ref 0–1.2)
BUN SERPL-MCNC: 13 MG/DL (ref 8–23)
BUN/CREAT SERPL: 23.2 (ref 7–25)
CALCIUM SPEC-SCNC: 9.2 MG/DL (ref 8.6–10.5)
CHLORIDE SERPL-SCNC: 100 MMOL/L (ref 98–107)
CO2 SERPL-SCNC: 27.9 MMOL/L (ref 22–29)
CREAT SERPL-MCNC: 0.56 MG/DL (ref 0.57–1)
DEPRECATED RDW RBC AUTO: 45.1 FL (ref 37–54)
EGFRCR SERPLBLD CKD-EPI 2021: 93.5 ML/MIN/1.73
EOSINOPHIL # BLD AUTO: 0.2 10*3/MM3 (ref 0–0.4)
EOSINOPHIL NFR BLD AUTO: 2.6 % (ref 0.3–6.2)
ERYTHROCYTE [DISTWIDTH] IN BLOOD BY AUTOMATED COUNT: 13.2 % (ref 12.3–15.4)
GLOBULIN UR ELPH-MCNC: 3.1 GM/DL
GLUCOSE SERPL-MCNC: 77 MG/DL (ref 65–99)
HCT VFR BLD AUTO: 42.7 % (ref 34–46.6)
HGB BLD-MCNC: 13.6 G/DL (ref 12–15.9)
IMM GRANULOCYTES # BLD AUTO: 0.02 10*3/MM3 (ref 0–0.05)
IMM GRANULOCYTES NFR BLD AUTO: 0.3 % (ref 0–0.5)
LYMPHOCYTES # BLD AUTO: 2.1 10*3/MM3 (ref 0.7–3.1)
LYMPHOCYTES NFR BLD AUTO: 26.9 % (ref 19.6–45.3)
MCH RBC QN AUTO: 29.4 PG (ref 26.6–33)
MCHC RBC AUTO-ENTMCNC: 31.9 G/DL (ref 31.5–35.7)
MCV RBC AUTO: 92.4 FL (ref 79–97)
MONOCYTES # BLD AUTO: 0.73 10*3/MM3 (ref 0.1–0.9)
MONOCYTES NFR BLD AUTO: 9.3 % (ref 5–12)
NEUTROPHILS NFR BLD AUTO: 4.72 10*3/MM3 (ref 1.7–7)
NEUTROPHILS NFR BLD AUTO: 60.3 % (ref 42.7–76)
NRBC BLD AUTO-RTO: 0 /100 WBC (ref 0–0.2)
PLATELET # BLD AUTO: 350 10*3/MM3 (ref 140–450)
PMV BLD AUTO: 9.3 FL (ref 6–12)
POTASSIUM SERPL-SCNC: 4.5 MMOL/L (ref 3.5–5.2)
PROT SERPL-MCNC: 7 G/DL (ref 6–8.5)
RBC # BLD AUTO: 4.62 10*6/MM3 (ref 3.77–5.28)
SODIUM SERPL-SCNC: 139 MMOL/L (ref 136–145)
WBC NRBC COR # BLD AUTO: 7.82 10*3/MM3 (ref 3.4–10.8)

## 2025-01-24 PROCEDURE — 85025 COMPLETE CBC W/AUTO DIFF WBC: CPT

## 2025-01-24 PROCEDURE — 36415 COLL VENOUS BLD VENIPUNCTURE: CPT

## 2025-01-24 PROCEDURE — 80053 COMPREHEN METABOLIC PANEL: CPT

## 2025-01-24 NOTE — PROGRESS NOTES
Subjective .     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   Status post partial thyroidectomy.   Status post knee surgery.     HISTORY OF PRESENT ILLNESS:  The patient is a 79 y.o. year old female who is here for follow-up with the above-mentioned history.    History of Present Illness     The patient is a 67-year-old female with the above history, currently here for followup. She completed 5 years of tamoxifen as of 2015.  She was initially diagnosed in .  Currently follow-up.  She has a family history of sister having had breast cancer and thereafter passed away.  She states that her sister was tested for genetics counseling and was negative.  She denies new pain.     Even though patient is 10 years out she prefers to follow-up here as she has  a strong family history of malignancy.  She is currently followed with observation.  Her mammogram was 2024 which is negative.    Oncologic history:  Patient has been followed here for quite some time since .  She does not want to be released from here.  She has 2 sisters both of them had breast cancer and one of them was following up with Dr. Moya in our group and  after 19 years with breast cancer.  Her other sister also has breast cancer.  She has patient has been tested and undergone genetic testing which has been negative.  I do not see the report of the genetic testing in the computer.  We will see if we can get it from [facility].    2023: Currently patient is comfortable without any complaints.  Reviewed mammogram from 2023 and it is negative    PAST MEDICAL HISTORY:   Hypertension.   High cholesterol.   Multinodular goiter of the thyroid gland. She did undergo ultrasound of the thyroid gland and found multinodular goiter. She is followed by Dr. Restrepo for that. She has had a biopsy in the past which was negative.    Dilatation and curettage x2 for excessive bleeding.     OB/GYN HISTORY: She started menstrual period at age 12. She obtained menopause at age 56. She is  4, para 3, one stillborn. She has 3 children, two daughters and one son, 37, 35 and 28 year old with normal deliveries.     ONCOLOGIC HISTORY:      The patient was following up with yearly mammogram. She had her yearly mammogram done 03/30/ 20 10 by Dr. Napoles her primary care physician which showed calcifications in the right breast with additional evaluation and as a result she repeated the mammogram on 2010 which showed calcifications in the right breast which were suspicious in the anterior one-third of the right breast at the 12 o' clock position 2 cm away from the nipple.    Subsequently the patient underwent biopsy on 0 4/16/20 10 done at [facility], #[id number]. Path is consistent with ductal carcinoma in situ low to intermediate grade, solid and cribriform types, at least 5 mm in maximum dimension. There was no invasive carcinoma identified and there was some fibrocystic changes identified. Hormonal assays were performed, ER positive at 99%, OK positive at 99%, HER/2 was 1+ and Ki-67 was 6.      She subsequently was referred to Dr. Reid and MRI of the breast was also obtained. MRI of the breast 0 4/24/20 10 had shown post biopsy cavity within the right breast at the 12 o'clock position with an internal metallic clip but no suspicio us surrounding or residual enhancement seen in the right breast. There was no evidence of any axillary adenopathy. There were no suspicious findings on the left breast.      The patient then was admitted to [facility] at which time she underwent surgery by Dr. Reid 0 5/19/20 10. Pathology #[id number]. She underwent right breast lumpectomy with needle localization. The right breast lumpectomy showed breast tissue with single focus of atypical ductal hyperplasia adjacent to the biopsy site. Res  idual carcinoma was not identified. There were some fibrocystic changes including ductal hyperplasia without atypia. The additional superior margin on the right breast showed duct hyperplasia without atypia and the deep margin also showed some duct hype rplasia without atypia.      As a result the patient was then referred to Dr. Sumeet Reddy for evaluation of brachytherapy. The patient then received brachytherapy with 10 total fractions given twice daily over a 5- treatment- day period and was prescribed 350 cGy per treatment fraction. The total dose she received was 3400 cGy in 10 fractions. Currently she is healed from surgery and is here for further recommendations.      Tamoxifen chemoprophylaxis started on 06/22/2010 to complete a total of 5 years.    Mammogram from 12/10/20 11 showed a new oval mass of 4.5 cm. She had a few round calcifications and postsurgical scar in the anterior one-third region of the right breast at the 12 o'clock positive. This area was thought to be consistent with seroma and pos tsurgical change, however, repeat mammogram was suggested in 6 months.        Dr. Reid aspirated 25 cc of clear fluid on 12/14/10 from the right side and the pathology, accession #[id number], was thought to have adipose stroma tissue, histiocytes and inflammat ory cells. No malignant cells were identified, consistent with fat necrosis. However, repeat mammogram and ultrasound are to be done because of the palpable mass, which is significantly large.      The patient underwent radiation 3400 cGy depth of 1 cm from the MammoSite balloon surface, given 10 fractions of 340 cGy each. Treatments were delivered for 5  treatment days, given twice daily. She completed her brachytherapy on 06/09/20 10.      The patient underwent mammogram on 05/17/2011. She was found to have a seroma in the right breast thought to be probably benign and she was to have bilateral yearly mammograms and ultrasound was suggested in  6  months but she has followup with Dr. Reid. He had done drainage of the right breast. The accession # is [id number] at [facility].   So the right breast fine needle aspiration basically showed some inflammatory cells. No malignant cells identified and hence he thought it was just a seroma and not to worry about.    Mammogram 12/14/2011 shows seroma in the right breast, probably benign, however, followup mammogram suggested in  6 months and followup ultrasound of the right breast is suggested in six months.      Mammogram done in June 2012 showed that she had seroma on the right breast, which was 21 mm with calcifications and postsurgical scar in the anterior one-third region of the right breast. It was felt that this is consistent with the seroma, as it had decreased in size. A right breast ultrasound was also done and the ultrasound suggested an oval elongated seroma w ith thick margins, about 21 mm. No solid or suspicious lesions were seen, but a 6-month followup was suggested.      Mammogram on 12/13/2012 was negative.    Patient had mammogram on 12/16/2013 and it was negative.    Her mammogram done 12/29/201 4 is negative.    Mammogram January 2018 negative      February 5, 2019: Left breast biopsy at 12:30 position fine-needle aspiration no malignant cells seen.  Cytology features consistent with benign fibrocystic changes.      Current Outpatient Medications on File Prior to Visit   Medication Sig Dispense Refill    acetaminophen (TYLENOL) 325 MG tablet Take 2 tablets by mouth 2 (Two) Times a Day As Needed for Mild Pain. 60 tablet 0    Aflibercept (Eylea HD) 8 MG/0.07ML solution EVERY 8-12- WEEKS      Ascorbic Acid (VITAMIN C) 500 MG capsule Take 1 capsule by mouth Daily. HOLDING FOR DOS      aspirin (SB Low Dose ASA EC) 81 MG EC tablet Take 1 tablet by mouth Daily. HOLDING FOR DOS      atorvastatin (LIPITOR) 10 MG tablet TAKE 1 TABLET DAILY 90 tablet 3    benazepril-hydrochlorthiazide (LOTENSIN HCT) 20-12.5  MG per tablet TAKE 1 TABLET DAILY 90 tablet 3    Calcium Carb-Cholecalciferol (CALCIUM 500 + D PO) Take 1 tablet by mouth Daily.      cetirizine (zyrTEC) 10 MG tablet Take 1 tablet by mouth Daily.      Cholecalciferol (VITAMIN D) 1000 UNITS tablet Take 1 tablet by mouth Daily. HOLDING FOR DOS      clindamycin (Cleocin) 300 MG capsule Take both caps 1 hour prior to dental procedure 2 capsule 0    Denosumab (PROLIA SC) Inject  under the skin Every 6 (Six) Months.      doxycycline (VIBRAMYCIN) 100 MG capsule Take 1 capsule by mouth Daily.      metroNIDAZOLE (METROCREAM) 0.75 % cream Apply 1 dose topically to the appropriate area as directed 2 (Two) Times a Day.      Multiple Vitamins-Minerals (MULTIVITAMIN ADULT PO) Take 1 tablet by mouth Daily. HOLDING FOR DOS      multivitamins-minerals (PRESERVISION AREDS 2) capsule capsule Take 1 capsule by mouth 2 (Two) Times a Day. HOLDING FOR DOS      mupirocin (BACTROBAN) 2 % ointment Apply 1 Application topically to the appropriate area as directed Every 12 (Twelve) Hours As Needed.      sulfacetamide sodium-sulfur (AVAR,PLEXION) 10-5 % topical emulsion Apply  topically to the appropriate area as directed 2 (Two) Times a Day.      Synthroid 50 MCG tablet TAKE 1 TABLET DAILY 90 tablet 3     No current facility-administered medications on file prior to visit.       ALLERGIES:     Allergies   Allergen Reactions    Penicillins Hives and Swelling    Morphine Nausea And Vomiting     SOCIAL HISTORY: She works as a . She does not smoke. She does not drink alcohol. She is  and lives with her .     FAMILY HISTORY: Father  at 57 with a blood clot. Mother  at 72 with a st roke. She has one brother who had melanoma as well as non-Hodgkin lymphoma at age 41; currently he is 65 years old and in good health. She had a sister with breast cancer at age 50. She is followed by our office. She is 57 now and in good health. Hakeem edwards's sister who had breast  "cancer many years ago now developed metastatic breast cancer, followed by Dr. Gold in our group.         Review of Systems   Constitutional:  Negative for appetite change, chills, diaphoresis, fatigue, fever and unexpected weight change.   HENT:  Negative for hearing loss, sore throat and trouble swallowing.    Respiratory:  Negative for cough, chest tightness, shortness of breath and wheezing.    Cardiovascular:  Negative for chest pain, palpitations and leg swelling.   Gastrointestinal:  Negative for abdominal distention, abdominal pain, constipation, diarrhea, nausea and vomiting.   Genitourinary:  Negative for dysuria, frequency, hematuria and urgency.   Musculoskeletal:  Negative for joint swelling.        No muscle weakness.   Skin:  Negative for rash and wound.   Neurological:  Negative for seizures, syncope, speech difficulty, weakness, numbness and headaches.   Hematological:  Negative for adenopathy. Does not bruise/bleed easily.   Psychiatric/Behavioral:  Negative for behavioral problems, confusion and suicidal ideas.    All other systems reviewed and are negative.    I have reviewed and confirmed the accuracy of the ROS as documented by the MA/LPN/RN MEGHAN Gonzales        Objective      Vitals:    01/24/25 1321   BP: 132/80   Pulse: 80   Resp: 16   Temp: 97.2 °F (36.2 °C)   TempSrc: Oral   SpO2: 96%   Weight: 81.3 kg (179 lb 3.2 oz)   Height: 163.2 cm (64.25\")   PainSc: 0-No pain             11/28/2023    10:55 AM   Current Status   ECOG score 0       Physical examination    This patient's ACP documentation is up to date, and there's nothing further left to document.      CONSTITUTIONAL:  Vital signs reviewed.  No distress, looks comfortable.  EYES:  Conjunctivae and lids unremarkable.  PERRLA  EARS,NOSE,MOUTH,THROAT:  Ears and nose appear unremarkable.  Lips, teeth, gums appear unremarkable.  RESPIRATORY:  Normal respiratory effort.  Lungs clear to auscultation bilaterally.  BREAST: Right breast: " No skin changes, no evidence of breast mass, no nipple discharge, no evidence of any right axillary adenopathy or right supraclavicular adenopathy  Left breast: No evidence of any skin changes, no evidence of any left breast mass and no evidence of left nipple discharge as well as no left axillary adenopathy or left supraclavicular adenopathy.  CARDIOVASCULAR:  Normal S1, S2.  No murmurs rubs or gallops.  No significant lower extremity edema.  GASTROINTESTINAL: Abdomen appears unremarkable.  Nontender.  No hepatomegaly.  No splenomegaly.  LYMPHATIC:  No cervical, supraclavicular, axillary lymphadenopathy.  SKIN:  Warm.  No rashes.  PSYCHIATRIC:  Normal judgment and insight.  Normal mood and affect.    I have reexamined the patient and the results are consistent with the previously documented exam. MEGHAN Gonzales     RECENT LABS:  Hematology WBC   Date Value Ref Range Status   01/24/2025 7.82 3.40 - 10.80 10*3/mm3 Final   08/10/2020 8.57 3.40 - 10.80 10*3/mm3 Final     RBC   Date Value Ref Range Status   01/24/2025 4.62 3.77 - 5.28 10*6/mm3 Final   08/10/2020 4.88 3.77 - 5.28 10*6/mm3 Final     Hemoglobin   Date Value Ref Range Status   01/24/2025 13.6 12.0 - 15.9 g/dL Final     Hematocrit   Date Value Ref Range Status   01/24/2025 42.7 34.0 - 46.6 % Final     Platelets   Date Value Ref Range Status   01/24/2025 350 140 - 450 10*3/mm3 Final        Assessment & Plan     1. This is a patient with history of ductal carcinoma in situ; she completed tamoxifen 5 years. She is here for followup. S he is on a yearly mammogram. She has a family history of breast cancer and she prefers to come here once a year; hence, we will keep her once a year over here.  Patient is 8 years out and followed up yearly.  October 6, 2020 complaints of small nodule on the medial aspect of left breast.  Will obtain left diagnostic mammogram and ultrasound  Reviewed left diagnostic mammogram, 17 x 7 x 16 mm lesion likely lipoma.  Patient had  undergone left diagnostic mammogram November 10, 2020 which basically showed that the palpable area of concern in the medial left breast posteriorly there was no mammographic finding.  Left breast ultrasound showed a oval hyperechoic mass with thin margins 17 x 7 x 19 mm at 9 o'clock position in the left breast and consistent with a lipoma.  Patient was thought to have a benign mammogram  Seen by Dr. Reid 2020 and he did not feel that there was any treatment needed for the lipoma.  Reviewed mammogram from 2021 and it is normal  Patient prefers to continue follow-up once a year as she has strong family history of malignancy  Mammogram 2024 and was negative  2025 patient returns today in follow-up and denies any new pain or concerns on self breast exam.  No concerns on clinical exam.  Will arrange mammogram in March.    2.  Family history of breast cancer, her sister has metastatic breast cancer with bone metastases and a brother who has history of non-Hodgkin's lymphoma 25 years ago and then developed Hodgkin's lymphoma.  Breast ca at 50 with mets   Another sister with breast ca at 74  Patient's 1 sister  recently from metastatic breast cancer after 19 years, was followed by Dr. Danielson's  Genetic test done which was negative for 94 genes    PLAN  Follow-up with observation  Patient possibility releasing to primary care, she prefers to continue to be followed by our office  Mammogram due in March, ordered today  Follow-up in 6 months with Dr. Johns, previously scheduled with him however appointment was adjusted.

## 2025-02-14 ENCOUNTER — OFFICE VISIT (OUTPATIENT)
Dept: ORTHOPEDIC SURGERY | Facility: CLINIC | Age: 80
End: 2025-02-14
Payer: MEDICARE

## 2025-02-14 VITALS — WEIGHT: 179.4 LBS | HEIGHT: 64 IN | BODY MASS INDEX: 30.63 KG/M2 | TEMPERATURE: 98.2 F

## 2025-02-14 DIAGNOSIS — Z09 SURGERY FOLLOW-UP: ICD-10-CM

## 2025-02-14 DIAGNOSIS — R52 PAIN: Primary | ICD-10-CM

## 2025-02-14 PROCEDURE — 99212 OFFICE O/P EST SF 10 MIN: CPT | Performed by: ORTHOPAEDIC SURGERY

## 2025-02-14 NOTE — PROGRESS NOTES
"Sagrario Varma : 1945 MRN: [id number] DATE: 2025    DIAGNOSIS:  Follow up for left shoulder arthroplasty      SUBJECTIVE:  Patient returns today for 9 month follow up of a left shoulder replacement. Patient reports doing well with no unusual complaints. Denies any limitations due to the shoulder.    OBJECTIVE:    Temp 98.2 °F (36.8 °C)   Ht 163.2 cm (64.25\")   Wt 81.4 kg (179 lb 6.4 oz)   BMI 30.55 kg/m²   Family History   Problem Relation Age of Onset    Stroke Mother 71    Hyperlipidemia Mother     Kidney disease Mother 71    Vision loss Mother     Diabetes Mother     Thrombosis Father     Hyperlipidemia Father     Clotting disorder Father 57    Breast cancer Sister 50        Metastatic    Cancer Sister         Metastatic breast cancer    Diabetes Sister         Likely due to cancer treatment    Cancer Sister         Breast cancer    Hyperlipidemia Sister     Melanoma Brother 41    Hodgkin's lymphoma Brother 41        non-Hodgkin lymphoma    Cancer Brother         NHL, Lymphoma, Leukemia    Hyperlipidemia Brother     Cancer Maternal Aunt         Lung cancer    Cancer Paternal Aunt     Hyperlipidemia Other     Hypertension Other     Stroke Other     Kidney failure Other     Diabetes Other     Heart disease Other     Melanoma Other     Malig Hyperthermia Neg Hx      Past Medical History:   Diagnosis Date    Age related osteoporosis 2021    Arthritis     Arthritis of back Years ago    Bilateral carotid artery stenosis     17    Breast cancer     Cancer     Cataract     Cervical disc disorder 2010    Narrowing of C5 & C7 noted    Class 1 obesity due to excess calories without serious comorbidity with body mass index (BMI) of 32.0 to 32.9 in adult 2023    Colon polyp     DCIS (ductal carcinoma in situ)     right    Fracture of wrist     Left wrist    Fracture, clavicle Don't recall    Was told I had one    Fracture, humerus     Left arm    Fracture, ulna     Left arm "    HBP (high blood pressure)     hypertension    Hypercholesterolemia     Hypertension     Hypothyroidism     Left shoulder pain     Macular degeneration     Multinodular goiter     of thyroid gland    Osteoarthritis     Periarthritis of shoulder     Saw Dr. Galeas for this previously.    PONV (postoperative nausea and vomiting)     Postoperative hypothyroidism 01/28/2020    Rosacea     Rotator cuff syndrome     Seasonal allergies     Thyroid nodule 12/23/2013    BENIGN     Past Surgical History:   Procedure Laterality Date    APPENDECTOMY  01/1961    BREAST LUMPECTOMY Right 05/2010    COLONOSCOPY  Feb 2022    No problems found    DILATATION AND CURETTAGE      x2 for excessive bleeding    ENDOSCOPY      EYE SURGERY Bilateral     CATARACTS    JOINT REPLACEMENT  Apr 2014    Right TKR    KNEE SURGERY      OVARIAN CYST SURGERY  01/1961    SHOULDER SURGERY  5/9/2024    THYROIDECTOMY      TOTAL SHOULDER ARTHROPLASTY W/ DISTAL CLAVICLE EXCISION Left 05/09/2024    Procedure: 1.  LEFT TOTAL SHOULDER REVERSE ARTHROPLASTY  2.  Right shoulder injection;  Surgeon: Juan Galeas MD;  [facility] OR [facility];  Service: Orthopedics;  Laterality: Left;    TRIGGER POINT INJECTION  Mar 2019    5 in left shoulder    WRIST SURGERY  1951     Social History     Socioeconomic History    Marital status:      Spouse name: Bulmaro    Number of children: 3   Tobacco Use    Smoking status: Never     Passive exposure: Never    Smokeless tobacco: Never   Vaping Use    Vaping status: Never Used   Substance and Sexual Activity    Alcohol use: Not Currently     Comment: SELDOM    Drug use: No    Sexual activity: Yes     Partners: Male     Birth control/protection: None       14 point review of systems is reviewed with the patient.  Pertinent positives are listed above.  All others are negative.    Exam: The incision is well healed.  No effusion.  FE: 160.  ER 50.  IR T10 . The arm is soft and nontender.  Good strength with elevation and  abduction. Intact sensation to light touch.  Palpable radial pulse    DIAGNOSTIC STUDIES    Xrays: AP, scapular Y and axillary views of the left shoulder are ordered and reviewed for evaluation of shoulder replacement. The x-rays demonstrate a well positioned, well aligned replacement without complicating factors noted. In comparison with previous films there has been no change.    ASSESSMENT:  9 month follow up for left shoulder replacement.    PLAN:  Appropriate activity modifications and restrictions discussed.  Antibiotic prophylaxis recommendations discussed.  Continue annual follow-up.    Juan Galeas MD    02/14/2025

## 2025-03-10 ENCOUNTER — APPOINTMENT (OUTPATIENT)
Dept: WOMENS IMAGING | Facility: HOSPITAL | Age: 80
End: 2025-03-10
Payer: MEDICARE

## 2025-03-10 PROCEDURE — 77067 SCR MAMMO BI INCL CAD: CPT | Performed by: RADIOLOGY

## 2025-03-10 PROCEDURE — 77063 BREAST TOMOSYNTHESIS BI: CPT | Performed by: RADIOLOGY

## 2025-03-17 ENCOUNTER — TELEPHONE (OUTPATIENT)
Dept: ONCOLOGY | Facility: CLINIC | Age: 80
End: 2025-03-17
Payer: MEDICARE

## 2025-03-17 ENCOUNTER — RESULTS FOLLOW-UP (OUTPATIENT)
Dept: ONCOLOGY | Facility: CLINIC | Age: 80
End: 2025-03-17
Payer: MEDICARE

## 2025-04-29 DIAGNOSIS — Z96.612 STATUS POST REVERSE TOTAL REPLACEMENT OF LEFT SHOULDER: ICD-10-CM

## 2025-04-29 RX ORDER — CEPHALEXIN 500 MG/1
CAPSULE ORAL
Qty: 4 CAPSULE | Refills: 1 | Status: SHIPPED | OUTPATIENT
Start: 2025-04-29

## 2025-04-29 RX ORDER — CEPHALEXIN 500 MG/1
CAPSULE ORAL
Qty: 4 CAPSULE | Refills: 1 | OUTPATIENT
Start: 2025-04-29

## 2025-05-13 ENCOUNTER — HOSPITAL ENCOUNTER (OUTPATIENT)
Facility: HOSPITAL | Age: 80
Discharge: HOME | End: 2025-05-13
Admitting: NURSE PRACTITIONER
Payer: MEDICARE

## 2025-05-13 ENCOUNTER — OFFICE VISIT (OUTPATIENT)
Age: 80
End: 2025-05-13
Payer: MEDICARE

## 2025-05-13 VITALS
SYSTOLIC BLOOD PRESSURE: 160 MMHG | BODY MASS INDEX: 30.05 KG/M2 | HEIGHT: 64 IN | DIASTOLIC BLOOD PRESSURE: 88 MMHG | WEIGHT: 176 LBS

## 2025-05-13 DIAGNOSIS — I65.23 BILATERAL CAROTID ARTERY OCCLUSION: ICD-10-CM

## 2025-05-13 DIAGNOSIS — I65.23 CAROTID STENOSIS, BILATERAL: Primary | ICD-10-CM

## 2025-05-13 LAB
BH CV XLRA MEAS LEFT CAROTID BULB EDV: -23.5 CM/SEC
BH CV XLRA MEAS LEFT CAROTID BULB PSV: -72.1 CM/SEC
BH CV XLRA MEAS LEFT DIST CCA EDV: -32.2 CM/SEC
BH CV XLRA MEAS LEFT DIST CCA PSV: -116.7 CM/SEC
BH CV XLRA MEAS LEFT DIST ICA EDV: -39.3 CM/SEC
BH CV XLRA MEAS LEFT DIST ICA PSV: -113.9 CM/SEC
BH CV XLRA MEAS LEFT ICA/CCA RATIO: 1.12
BH CV XLRA MEAS LEFT MID CCA EDV: -32.2 CM/SEC
BH CV XLRA MEAS LEFT MID CCA PSV: -102.1 CM/SEC
BH CV XLRA MEAS LEFT MID ICA EDV: -40.1 CM/SEC
BH CV XLRA MEAS LEFT MID ICA PSV: -131.2 CM/SEC
BH CV XLRA MEAS LEFT PROX CCA EDV: 30.6 CM/SEC
BH CV XLRA MEAS LEFT PROX CCA PSV: 134.4 CM/SEC
BH CV XLRA MEAS LEFT PROX ECA EDV: -27.5 CM/SEC
BH CV XLRA MEAS LEFT PROX ECA PSV: -139.1 CM/SEC
BH CV XLRA MEAS LEFT PROX ICA EDV: -40.1 CM/SEC
BH CV XLRA MEAS LEFT PROX ICA PSV: -120.2 CM/SEC
BH CV XLRA MEAS LEFT PROX SCLA PSV: 180.7 CM/SEC
BH CV XLRA MEAS LEFT VERTEBRAL A EDV: 16.5 CM/SEC
BH CV XLRA MEAS LEFT VERTEBRAL A PSV: 77.8 CM/SEC
BH CV XLRA MEAS RIGHT CAROTID BULB EDV: -26.7 CM/SEC
BH CV XLRA MEAS RIGHT CAROTID BULB PSV: -134.4 CM/SEC
BH CV XLRA MEAS RIGHT DIST CCA EDV: -25.2 CM/SEC
BH CV XLRA MEAS RIGHT DIST CCA PSV: -123.1 CM/SEC
BH CV XLRA MEAS RIGHT DIST ICA EDV: -41.6 CM/SEC
BH CV XLRA MEAS RIGHT DIST ICA PSV: -146.1 CM/SEC
BH CV XLRA MEAS RIGHT ICA/CCA RATIO: 1.19
BH CV XLRA MEAS RIGHT MID CCA EDV: 36.9 CM/SEC
BH CV XLRA MEAS RIGHT MID CCA PSV: 120.2 CM/SEC
BH CV XLRA MEAS RIGHT MID ICA EDV: 40.1 CM/SEC
BH CV XLRA MEAS RIGHT MID ICA PSV: 124.1 CM/SEC
BH CV XLRA MEAS RIGHT PROX CCA EDV: 36.1 CM/SEC
BH CV XLRA MEAS RIGHT PROX CCA PSV: 141.4 CM/SEC
BH CV XLRA MEAS RIGHT PROX ECA EDV: -27.5 CM/SEC
BH CV XLRA MEAS RIGHT PROX ECA PSV: -133.6 CM/SEC
BH CV XLRA MEAS RIGHT PROX ICA EDV: -27.5 CM/SEC
BH CV XLRA MEAS RIGHT PROX ICA PSV: -126.5 CM/SEC
BH CV XLRA MEAS RIGHT PROX SCLA PSV: 150.6 CM/SEC
BH CV XLRA MEAS RIGHT VERTEBRAL A EDV: 13.8 CM/SEC
BH CV XLRA MEAS RIGHT VERTEBRAL A PSV: 48.3 CM/SEC
LEFT ARM BP: NORMAL MMHG
RIGHT ARM BP: NORMAL MMHG

## 2025-05-13 PROCEDURE — 93880 EXTRACRANIAL BILAT STUDY: CPT

## 2025-05-13 NOTE — PROGRESS NOTES
Chief Complaint  Carotid Artery Disease    Subjective        Sagrario Varma presents to [facility] VASCULAR [facility]GERY  HPI   Sagrario Varma is a 79 y.o. female that has been followed in our office for carotid artery stenosis. She returns today in follow up along with a carotid duplex. She  reports she has been doing well without hospitalizations or surgeries. She denies any symptoms consistent with CVA, TIA, or amaurosis fugax.     Review of Systems   Constitutional:  Negative for fever.   Eyes:  Negative for visual disturbance.   Cardiovascular:  Negative for leg swelling.   Gastrointestinal:  Negative for abdominal pain.   Musculoskeletal:  Positive for joint swelling and myalgias. Negative for back pain.   Skin:  Negative for color change, pallor and wound.   Neurological:  Negative for dizziness, facial asymmetry, speech difficulty and weakness.        Sagrario Varma  reports that she has never smoked. She has never been exposed to tobacco smoke. She has never used smokeless tobacco..        Objective   Vital Signs:  Vitals:    05/13/25 1407   BP: 160/88        Body mass index is 29.98 kg/m².       Physical Exam  Vitals reviewed.   Constitutional:       Appearance: Normal appearance.   HENT:      Head: Normocephalic.   Cardiovascular:      Rate and Rhythm: Normal rate and regular rhythm.      Pulses:           Dorsalis pedis pulses are 3+ on the right side and 3+ on the left side.        Posterior tibial pulses are 3+ on the right side and 3+ on the left side.   Pulmonary:      Effort: Pulmonary effort is normal.   Skin:     General: Skin is warm.   Neurological:      General: No focal deficit present.      Mental Status: She is alert and oriented to person, place, and time.   Psychiatric:         Mood and Affect: Mood normal.          Result Review :      Previous carotid duplex: 50-69% stenosis on the right, 50% stenosis on the left     Carotid duplex from today: Duplex Carotid Ultrasound  CAR (05/13/2025 14:01)                  Assessment and Plan     Diagnoses and all orders for this visit:    1. Carotid stenosis, bilateral (Primary)  -     Duplex Carotid Ultrasound CAR; Future               Patient presents for follow up of her carotid stenosis. Today, she has a less than 50% stenosis bilaterally. She is to continue her antiplatelet agent which is aspirin. She is on a statin for cholesterol control.  We discussed adequate blood pressure control. She will return in 1 year along with a repeat carotid artery duplex.    Follow Up     Return in about 1 year (around 5/13/2026) for carotid duplex.  Patient was given instructions and counseling regarding her condition or for health maintenance advice. Please see specific information pulled into the AVS if appropriate.     MEGHAN Costello

## (undated) DEVICE — BIT DRL SCRW PERIPH 2.7MM

## (undated) DEVICE — GLV SURG BIOGEL LTX PF 6 1/2

## (undated) DEVICE — SKIN PREP TRAY W/CHG: Brand: MEDLINE INDUSTRIES, INC.

## (undated) DEVICE — DRAPE,SHOULDER,BEACH ULTRAGARD: Brand: MEDLINE

## (undated) DEVICE — PAD,NON-ADHERENT,3X8,STERILE,LF,1/PK: Brand: MEDLINE

## (undated) DEVICE — APPL CHLORAPREP HI/LITE 26ML ORNG

## (undated) DEVICE — DRAPE,U/ SHT,SPLIT,PLAS,STERIL: Brand: MEDLINE

## (undated) DEVICE — SUT VIC 2/0 CT2 27IN J269H

## (undated) DEVICE — DUAL CUT SAGITTAL BLADE

## (undated) DEVICE — MAT FLR ABSORBENT LG 4FT 10 2.5FT

## (undated) DEVICE — GLV SURG BIOGEL LTX PF 7

## (undated) DEVICE — MARKER,SKIN,WI/RULER AND LABELS: Brand: MEDLINE

## (undated) DEVICE — SOL ISO/ALC 70PCT 4OZ

## (undated) DEVICE — SUT SILK 2/0 TIES 18IN A185H

## (undated) DEVICE — PK SHLDR OPN 40

## (undated) DEVICE — PATIENT RETURN ELECTRODE, SINGLE-USE, CONTACT QUALITY MONITORING, ADULT, WITH 9FT CORD, FOR PATIENTS WEIGING OVER 33LBS. (15KG): Brand: MEGADYNE

## (undated) DEVICE — GLV SURG SENSICARE PI MIC PF SZ7 LF STRL

## (undated) DEVICE — GLV SURG BIOGEL LTX PF 8 1/2

## (undated) DEVICE — SOL IRR NACL 0.9PCT 3000ML

## (undated) DEVICE — PIN STNMAN 3.2MM 9IN
Type: IMPLANTABLE DEVICE | Site: SHOULDER | Status: NON-FUNCTIONAL
Removed: 2024-05-09

## (undated) DEVICE — HANDPIECE SET WITH COAXIAL HIGH FLOW TIP AND SUCTION TUBE: Brand: INTERPULSE

## (undated) DEVICE — GLV SURG SIGNATURE ESSENTIAL PF LTX SZ8.5

## (undated) DEVICE — STRIP,CLOSURE,WOUND,MEDI-STRIP,1/2X4: Brand: MEDLINE

## (undated) DEVICE — PREP SOL POVIDONE/IODINE BT 4OZ

## (undated) DEVICE — 3M™ IOBAN™ 2 ANTIMICROBIAL INCISE DRAPE 6640EZ: Brand: IOBAN™ 2

## (undated) DEVICE — DRSNG SURESITE WNDW 4X4.5